# Patient Record
Sex: MALE | Race: OTHER | HISPANIC OR LATINO | Employment: UNEMPLOYED | URBAN - METROPOLITAN AREA
[De-identification: names, ages, dates, MRNs, and addresses within clinical notes are randomized per-mention and may not be internally consistent; named-entity substitution may affect disease eponyms.]

---

## 2021-04-28 ENCOUNTER — HOSPITAL ENCOUNTER (EMERGENCY)
Facility: HOSPITAL | Age: 56
Discharge: HOME | End: 2021-04-28
Attending: EMERGENCY MEDICINE | Admitting: EMERGENCY MEDICINE
Payer: MEDICARE

## 2021-04-28 VITALS
OXYGEN SATURATION: 95 % | TEMPERATURE: 97.5 F | DIASTOLIC BLOOD PRESSURE: 75 MMHG | RESPIRATION RATE: 20 BRPM | SYSTOLIC BLOOD PRESSURE: 121 MMHG | HEART RATE: 94 BPM

## 2021-04-28 DIAGNOSIS — K04.7 DENTAL INFECTION: Primary | ICD-10-CM

## 2021-04-28 PROCEDURE — 99284 EMERGENCY DEPT VISIT MOD MDM: CPT | Performed by: PHYSICIAN ASSISTANT

## 2021-04-28 PROCEDURE — 99282 EMERGENCY DEPT VISIT SF MDM: CPT

## 2021-04-28 RX ORDER — AMOXICILLIN 500 MG/1
500 CAPSULE ORAL EVERY 8 HOURS SCHEDULED
Qty: 21 CAPSULE | Refills: 0 | Status: SHIPPED | OUTPATIENT
Start: 2021-04-28 | End: 2021-05-05

## 2021-04-28 RX ORDER — IBUPROFEN 600 MG/1
600 TABLET ORAL EVERY 6 HOURS PRN
Qty: 20 TABLET | Refills: 0 | Status: SHIPPED | OUTPATIENT
Start: 2021-04-28

## 2021-04-28 NOTE — ED PROVIDER NOTES
History  Chief Complaint   Patient presents with    Dental Pain     Patient presents to the ER for evaluation of dental pain  The patient states that a few years ago he had a root canal done in his right lower molar  States that for the past 3 days he has noticed increased pain in that tooth and mild swelling  States that the pain is worse with hot and cold liquids as well as chewing  States he has been taking Tylenol with mild relief  Denies any radiation of pain or new trauma  Denies any fevers, difficulty swallowing, or any other concerning symptoms  None       History reviewed  No pertinent past medical history  History reviewed  No pertinent surgical history  History reviewed  No pertinent family history  I have reviewed and agree with the history as documented  E-Cigarette/Vaping     E-Cigarette/Vaping Substances     Social History     Tobacco Use    Smoking status: Never Smoker    Smokeless tobacco: Never Used   Substance Use Topics    Alcohol use: Not Currently    Drug use: Not Currently       Review of Systems   Constitutional: Negative for chills and fever  HENT: Positive for dental problem and facial swelling  Negative for congestion and sore throat  Respiratory: Negative for cough and shortness of breath  Cardiovascular: Negative for chest pain  Gastrointestinal: Negative for abdominal pain, diarrhea, nausea and vomiting  Genitourinary: Negative for dysuria  Musculoskeletal: Negative for back pain  Skin: Negative for rash  Neurological: Negative for headaches  All other systems reviewed and are negative  Physical Exam  Physical Exam  Constitutional:       General: He is not in acute distress  Appearance: He is well-developed  HENT:      Head: Normocephalic and atraumatic  Nose: Nose normal       Mouth/Throat:        Comments: Minimal swelling along right lower mandible  No fluctuant mass or actively draining abscess  No trismus    No sublingual swelling  Tolerating secretions without difficulty  Eyes:      Conjunctiva/sclera: Conjunctivae normal    Neck:      Musculoskeletal: Normal range of motion  Cardiovascular:      Rate and Rhythm: Normal rate  Pulmonary:      Effort: Pulmonary effort is normal    Musculoskeletal: Normal range of motion  Lymphadenopathy:      Cervical: No cervical adenopathy  Skin:     General: Skin is warm  Capillary Refill: Capillary refill takes less than 2 seconds  Neurological:      Mental Status: He is alert and oriented to person, place, and time  Vital Signs  ED Triage Vitals [04/28/21 1044]   Temperature Pulse Respirations Blood Pressure SpO2   97 5 °F (36 4 °C) 94 20 121/75 95 %      Temp Source Heart Rate Source Patient Position - Orthostatic VS BP Location FiO2 (%)   Oral Monitor -- Right arm --      Pain Score       --           Vitals:    04/28/21 1044   BP: 121/75   Pulse: 94         Visual Acuity      ED Medications  Medications - No data to display    Diagnostic Studies  Results Reviewed     None                 No orders to display              Procedures  Procedures         ED Course  ED Course as of Apr 28 1055   Wed Apr 28, 2021   1050 Blood Pressure: 121/75   1050 Temperature: 97 5 °F (36 4 °C)   1050 Pulse: 94   1050 Respirations: 20   1050 SpO2: 95 %                             SBIRT 20yo+      Most Recent Value   SBIRT (25 yo +)   In order to provide better care to our patients, we are screening all of our patients for alcohol and drug use  Would it be okay to ask you these screening questions? No Filed at: 04/28/2021 1020                    MDM     Patient well appearing in the ER  Suspect likely dental infection given tenderness palpation of tooth and mild facial swelling  Pain is reproducible  Consistent with dental infection  No concern for cardiac cause of dental pain at this time  Patient nontoxic and afebrile    Discussed treatment with antibiotics and pain control  Discussed importance of following up with a dentist for definitive treatment  Strict return instructions given  Patient in no acute distress throughout ER stay  Vitals stable and reassuring  Patient stable for discharge at this time  Reviewed plan with patient/family  Reviewed red flag symptoms and strict return instructions  Patient/family voiced understanding and agreement to plan  Patient/family had opportunity to ask questions and all questions were answered at bedside  Disposition  Final diagnoses:   Dental infection     Time reflects when diagnosis was documented in both MDM as applicable and the Disposition within this note     Time User Action Codes Description Comment    4/28/2021 10:50 AM Dwaynetammi Latimer Vilma [K04 7] Dental infection       ED Disposition     ED Disposition Condition Date/Time Comment    Discharge Stable Wed Apr 28, 2021 10:50 AM Renata Villanueva Dr discharge to home/self care              Follow-up Information     Follow up With Specialties Details Why Contact Info Additional [address] Emergency Department Emergency Medicine  If symptoms worsen [address]  [address] [place] [address] Emergency Department, [address] [place] [address] for Oral and Maxillofacial Surgery [facility]   As discussed you must follow up with the dentist for definitive treatment [address] [facility] [address]           Patient's Medications   Discharge Prescriptions    AMOXICILLIN (AMOXIL) 500 MG CAPSULE    Take 1 capsule (500 mg total) by mouth every 8 (eight) hours for 7 days       Start Date: 4/28/2021 End Date: 5/5/2021       Order Dose: 500 mg       Quantity: 21 capsule    Refills: 0    IBUPROFEN (MOTRIN) 600 MG TABLET    Take 1 tablet (600 mg total) by mouth every 6 (six) hours as needed for mild pain       Start Date: 4/28/2021 End Date: --       Order Dose: 600 mg       Quantity: 20 tablet    Refills: 0     No discharge procedures on file      PDMP Review     None          ED Provider  Electronically Signed by           Ruchi Downs PA-C  04/28/21 1051

## 2021-04-28 NOTE — DISCHARGE INSTRUCTIONS
Return to the ER with any new or worsening of symptoms such as but not limited to increased pain, increased swelling, fevers, difficulty breathing, difficulty swallowing, or any other concerning symptmos    Take antibiotics as prescribed to completion  Thank you for allowing us to be part of your care today

## 2021-09-03 ENCOUNTER — HOSPITAL ENCOUNTER (EMERGENCY)
Facility: HOSPITAL | Age: 56
Discharge: HOME | End: 2021-09-03
Attending: EMERGENCY MEDICINE | Admitting: EMERGENCY MEDICINE
Payer: COMMERCIAL

## 2021-09-03 VITALS
SYSTOLIC BLOOD PRESSURE: 112 MMHG | DIASTOLIC BLOOD PRESSURE: 71 MMHG | RESPIRATION RATE: 16 BRPM | HEART RATE: 57 BPM | OXYGEN SATURATION: 97 % | TEMPERATURE: 98.2 F | BODY MASS INDEX: 27.46 KG/M2 | WEIGHT: 155 LBS | HEIGHT: 63 IN

## 2021-09-03 DIAGNOSIS — S05.02XA ABRASION OF LEFT CORNEA, INITIAL ENCOUNTER: Primary | ICD-10-CM

## 2021-09-03 LAB
ATRIAL RATE: 53 BPM
P AXIS: 62 DEGREES
PR INTERVAL: 116 MS
QRS AXIS: 70 DEGREES
QRSD INTERVAL: 98 MS
QT INTERVAL: 426 MS
QTC INTERVAL: 399 MS
T WAVE AXIS: 45 DEGREES
VENTRICULAR RATE: 53 BPM

## 2021-09-03 PROCEDURE — 99283 EMERGENCY DEPT VISIT LOW MDM: CPT

## 2021-09-03 PROCEDURE — 93010 ELECTROCARDIOGRAM REPORT: CPT | Performed by: INTERNAL MEDICINE

## 2021-09-03 PROCEDURE — 93005 ELECTROCARDIOGRAM TRACING: CPT

## 2021-09-03 PROCEDURE — 99284 EMERGENCY DEPT VISIT MOD MDM: CPT | Performed by: EMERGENCY MEDICINE

## 2021-09-03 RX ORDER — TETRACAINE HYDROCHLORIDE 5 MG/ML
1 SOLUTION OPHTHALMIC ONCE
Status: COMPLETED | OUTPATIENT
Start: 2021-09-03 | End: 2021-09-03

## 2021-09-03 RX ORDER — ERYTHROMYCIN 5 MG/G
OINTMENT OPHTHALMIC
Qty: 3.5 G | Refills: 0 | Status: SHIPPED | OUTPATIENT
Start: 2021-09-03

## 2021-09-03 RX ADMIN — TETRACAINE HYDROCHLORIDE 1 DROP: 5 SOLUTION OPHTHALMIC at 12:03

## 2021-09-03 RX ADMIN — FLUORESCEIN SODIUM 1 STRIP: 1 STRIP OPHTHALMIC at 12:03

## 2021-09-03 NOTE — ED PROVIDER NOTES
History  Chief Complaint   Patient presents with    Eye Pain     pt reports left eye pain and redness  reports visual changes  denies drainage     Patient is a 44-year-old male, no significant past medical history, who presents to the emergency department for sudden-onset left eye pain  Patient states the pain started yesterday morning  He describes the pain as a burning, and it is only of his left eye  Patient states he tried using some eyedrops he had left over with no relief  There are no other modifying factors  There are no associated symptoms  Patient denies any actual vision changes  He denies any headaches, dizziness, nausea, vomiting, diarrhea, or any other new or concerning symptoms  He denies any prior history of eye pain like this in the past   He does not wear contacts  Prior to Admission Medications   Prescriptions Last Dose Informant Patient Reported? Taking?   ibuprofen (MOTRIN) 600 mg tablet   No No   Sig: Take 1 tablet (600 mg total) by mouth every 6 (six) hours as needed for mild pain      Facility-Administered Medications: None       History reviewed  No pertinent past medical history  History reviewed  No pertinent surgical history  History reviewed  No pertinent family history  I have reviewed and agree with the history as documented  E-Cigarette/Vaping     E-Cigarette/Vaping Substances     Social History     Tobacco Use    Smoking status: Never Smoker    Smokeless tobacco: Never Used   Substance Use Topics    Alcohol use: Not Currently    Drug use: Not Currently        Review of Systems   Constitutional: Negative for chills and fever  Eyes: Positive for pain and redness  Negative for visual disturbance  Respiratory: Negative for cough and shortness of breath  Gastrointestinal: Negative for diarrhea, nausea and vomiting  Neurological: Negative for dizziness and headaches  All other systems reviewed and are negative        Physical Exam  ED Triage Vitals [09/03/21 1124]   Temperature Pulse Respirations Blood Pressure SpO2   98 2 °F (36 8 °C) 57 16 112/71 97 %      Temp Source Heart Rate Source Patient Position - Orthostatic VS BP Location FiO2 (%)   Tympanic Monitor Sitting Right arm --      Pain Score       8             Orthostatic Vital Signs  Vitals:    09/03/21 1124   BP: 112/71   Pulse: 57   Patient Position - Orthostatic VS: Sitting       Physical Exam  Vitals and nursing note reviewed  Constitutional:       General: He is not in acute distress  Appearance: He is well-developed  He is not diaphoretic  HENT:      Head: Normocephalic and atraumatic  Right Ear: External ear normal       Left Ear: External ear normal       Nose: Nose normal    Eyes:      General: Lids are normal  No scleral icterus  Left eye: No foreign body  Intraocular pressure: Right eye pressure is 12 mmHg  Left eye pressure is 12 mmHg  Extraocular Movements: Extraocular movements intact  Right eye: Normal extraocular motion and no nystagmus  Left eye: Normal extraocular motion and no nystagmus  Pupils: Pupils are equal, round, and reactive to light  Right eye: Pupil is round and reactive  Left eye: Pupil is round and reactive  Corneal abrasion and fluorescein uptake present  Comments: Subconjunctival hemorrhage of the medial half of patient's left eye  There are also several small areas of fluorescein uptake on top of the subconjunctival hemorrhage  Left eye lid was everted, no obvious foreign bodies   Cardiovascular:      Rate and Rhythm: Normal rate and regular rhythm  Heart sounds: Normal heart sounds  No murmur heard  No friction rub  No gallop  Pulmonary:      Effort: Pulmonary effort is normal  No respiratory distress  Breath sounds: Normal breath sounds  No wheezing or rales  Musculoskeletal:         General: No deformity  Normal range of motion  Skin:     General: Skin is warm and dry     Neurological: General: No focal deficit present  Mental Status: He is alert  Psychiatric:         Mood and Affect: Mood normal          Behavior: Behavior normal          ED Medications  Medications   fluorescein sodium sterile ophthalmic strip 1 strip (1 strip Both Eyes Given 9/3/21 1203)   tetracaine 0 5 % ophthalmic solution 1 drop (1 drop Left Eye Given 9/3/21 1203)       Diagnostic Studies  Results Reviewed     None                 No orders to display         Procedures  Procedures      ED Course                                       MDM  Number of Diagnoses or Management Options  Abrasion of left cornea, initial encounter  Diagnosis management comments: Patient is a 55 y o  male who presents to the ED for left eye pain and redness  Significant physical exam findings include subconjunctival hemorrhage and fluorescein uptake over the medial half of patient's left eye  Ocular pressure is within normal limits  Vision grossly intact  Clinical impression is corneal abrasion  Will prescribe antibiotic ointment for patient's corneal abrasion  Recommended Tylenol or ibuprofen as needed for any pain  Discussed follow-up with ophthalmology within the next 3-5 days if patient's symptoms persist   In addition, recommended he follow up with a primary care provider  Return precautions discussed  Patient verbalized understanding and agreed to plan of care  Portions of the record may have been created with voice recognition software  Occasional wrong word or "sound a like" substitutions may have occurred due to the inherent limitations of voice recognition software  Read the chart carefully and recognize, using context, where substitutions have occurred        Risk of Complications, Morbidity, and/or Mortality  Presenting problems: moderate  Diagnostic procedures: moderate  Management options: moderate    Patient Progress  Patient progress: stable      Disposition  Final diagnoses:   Abrasion of left cornea, initial encounter     Time reflects when diagnosis was documented in both MDM as applicable and the Disposition within this note     Time User Action Codes Description Comment    9/3/2021 12:17 PM Harika Mkie Add [S05  02XA] Abrasion of left cornea, initial encounter       ED Disposition     ED Disposition Condition Date/Time Comment    Discharge Stable Fri Sep 3, 2021 12:17 PM Renata Villanueva Dr discharge to home/self care  Follow-up Information     Follow up With Specialties Details Why Contact Info Additional [address] Emergency Department Emergency Medicine  As needed [address]  [address] [facility] [address] Emergency Department, [address], [facility] [address]    [address]  Call in 1 day  [phone]       Cherise Hassan MD Ophthalmology Schedule an appointment as soon as possible for a visit   Andrea Wright [address] [place] [id number]  [phone]             Discharge Medication List as of 9/3/2021 12:23 PM      START taking these medications    Details   erythromycin (ILOTYCIN) ophthalmic ointment Place a 1/2 inch ribbon of ointment into the left lower eyelid 4 times a day for 5 days  , Normal         CONTINUE these medications which have NOT CHANGED    Details   ibuprofen (MOTRIN) 600 mg tablet Take 1 tablet (600 mg total) by mouth every 6 (six) hours as needed for mild pain, Starting Wed 4/28/2021, Normal           No discharge procedures on file  PDMP Review     None           ED Provider  Attending physically available and evaluated Renata Mustafa I managed the patient along with the ED Attending      Electronically Signed by         Davin Foster DO  09/03/21 5196

## 2021-09-03 NOTE — ED ATTENDING ATTESTATION
9/3/2021  ICandida MD, saw and evaluated the patient  I have discussed the patient with the resident/non-physician practitioner and agree with the resident's/non-physician practitioner's findings, Plan of Care, and MDM as documented in the resident's/non-physician practitioner's note, except where noted  All available labs and Radiology studies were reviewed  I was present for key portions of any procedure(s) performed by the resident/non-physician practitioner and I was immediately available to provide assistance  At this point I agree with the current assessment done in the Emergency Department  I have conducted an independent evaluation of this patient a history and physical is as follows:    ED Course         Critical Care Time  Procedures    54 yo male with left medial eye pain and redness since yesterday  No change in vision  Pt with burning and redness  No hx of same, no trauma, no fb sensation, no headache  Vss, afebrile, lungs cta, rrr, alvina on left eye  Check visual acuity, pressure, stain

## 2021-09-03 NOTE — DISCHARGE INSTRUCTIONS
You were seen in the ED for left eye pain  Your evaluation showed small corneal abrasions  A prescription for an antibiotic cream was sent to your pharmacy  Please use as prescribed  You may use Tylenol or ibuprofen as needed for any pain  Please follow-up with ophthalmology  The number is attached  Please follow-up with your primary care provider  If you do not have one, you may call "infolink" above  Return to the emergency department if you develop any vision loss or changes, worsening pain, severe headaches, nausea, vomiting, or for any other new or concerning symptoms

## 2021-10-01 ENCOUNTER — APPOINTMENT (EMERGENCY)
Dept: RADIOLOGY | Facility: HOSPITAL | Age: 56
End: 2021-10-01
Payer: COMMERCIAL

## 2021-10-01 ENCOUNTER — HOSPITAL ENCOUNTER (EMERGENCY)
Facility: HOSPITAL | Age: 56
Discharge: HOME | End: 2021-10-01
Attending: EMERGENCY MEDICINE
Payer: COMMERCIAL

## 2021-10-01 VITALS
DIASTOLIC BLOOD PRESSURE: 66 MMHG | TEMPERATURE: 98.3 F | SYSTOLIC BLOOD PRESSURE: 113 MMHG | HEART RATE: 72 BPM | RESPIRATION RATE: 20 BRPM | OXYGEN SATURATION: 95 %

## 2021-10-01 DIAGNOSIS — R07.89 ATYPICAL CHEST PAIN: Primary | ICD-10-CM

## 2021-10-01 LAB
ALBUMIN SERPL BCP-MCNC: 4.1 G/DL (ref 3.5–5)
ALP SERPL-CCNC: 82 U/L (ref 46–116)
ALT SERPL W P-5'-P-CCNC: 101 U/L (ref 12–78)
ANION GAP SERPL CALCULATED.3IONS-SCNC: 5 MMOL/L (ref 4–13)
AST SERPL W P-5'-P-CCNC: 46 U/L (ref 5–45)
ATRIAL RATE: 70 BPM
ATRIAL RATE: 73 BPM
BASOPHILS # BLD AUTO: 0.04 THOUSANDS/ΜL (ref 0–0.1)
BASOPHILS NFR BLD AUTO: 0 % (ref 0–1)
BILIRUB SERPL-MCNC: 0.59 MG/DL (ref 0.2–1)
BUN SERPL-MCNC: 13 MG/DL (ref 5–25)
CALCIUM SERPL-MCNC: 9.4 MG/DL (ref 8.3–10.1)
CHLORIDE SERPL-SCNC: 108 MMOL/L (ref 100–108)
CO2 SERPL-SCNC: 24 MMOL/L (ref 21–32)
CREAT SERPL-MCNC: 0.78 MG/DL (ref 0.6–1.3)
EOSINOPHIL # BLD AUTO: 0.25 THOUSAND/ΜL (ref 0–0.61)
EOSINOPHIL NFR BLD AUTO: 3 % (ref 0–6)
ERYTHROCYTE [DISTWIDTH] IN BLOOD BY AUTOMATED COUNT: 12.5 % (ref 11.6–15.1)
GFR SERPL CREATININE-BSD FRML MDRD: 102 ML/MIN/1.73SQ M
GLUCOSE SERPL-MCNC: 105 MG/DL (ref 65–140)
HCT VFR BLD AUTO: 43.2 % (ref 36.5–49.3)
HGB BLD-MCNC: 14.7 G/DL (ref 12–17)
IMM GRANULOCYTES # BLD AUTO: 0.05 THOUSAND/UL (ref 0–0.2)
IMM GRANULOCYTES NFR BLD AUTO: 1 % (ref 0–2)
LYMPHOCYTES # BLD AUTO: 0.99 THOUSANDS/ΜL (ref 0.6–4.47)
LYMPHOCYTES NFR BLD AUTO: 11 % (ref 14–44)
MCH RBC QN AUTO: 29.8 PG (ref 26.8–34.3)
MCHC RBC AUTO-ENTMCNC: 34 G/DL (ref 31.4–37.4)
MCV RBC AUTO: 88 FL (ref 82–98)
MONOCYTES # BLD AUTO: 0.51 THOUSAND/ΜL (ref 0.17–1.22)
MONOCYTES NFR BLD AUTO: 6 % (ref 4–12)
NEUTROPHILS # BLD AUTO: 7.27 THOUSANDS/ΜL (ref 1.85–7.62)
NEUTS SEG NFR BLD AUTO: 79 % (ref 43–75)
NRBC BLD AUTO-RTO: 0 /100 WBCS
P AXIS: 57 DEGREES
P AXIS: 63 DEGREES
PLATELET # BLD AUTO: 240 THOUSANDS/UL (ref 149–390)
PMV BLD AUTO: 9.8 FL (ref 8.9–12.7)
POTASSIUM SERPL-SCNC: 3.9 MMOL/L (ref 3.5–5.3)
PR INTERVAL: 118 MS
PR INTERVAL: 128 MS
PROT SERPL-MCNC: 7.5 G/DL (ref 6.4–8.2)
QRS AXIS: 59 DEGREES
QRS AXIS: 67 DEGREES
QRSD INTERVAL: 88 MS
QRSD INTERVAL: 88 MS
QT INTERVAL: 378 MS
QT INTERVAL: 382 MS
QTC INTERVAL: 412 MS
QTC INTERVAL: 416 MS
RBC # BLD AUTO: 4.93 MILLION/UL (ref 3.88–5.62)
SODIUM SERPL-SCNC: 137 MMOL/L (ref 136–145)
T WAVE AXIS: 53 DEGREES
T WAVE AXIS: 54 DEGREES
TROPONIN I SERPL-MCNC: <0.02 NG/ML
TROPONIN I SERPL-MCNC: <0.02 NG/ML
VENTRICULAR RATE: 70 BPM
VENTRICULAR RATE: 73 BPM
WBC # BLD AUTO: 9.11 THOUSAND/UL (ref 4.31–10.16)

## 2021-10-01 PROCEDURE — 99285 EMERGENCY DEPT VISIT HI MDM: CPT

## 2021-10-01 PROCEDURE — 36415 COLL VENOUS BLD VENIPUNCTURE: CPT | Performed by: EMERGENCY MEDICINE

## 2021-10-01 PROCEDURE — 93005 ELECTROCARDIOGRAM TRACING: CPT

## 2021-10-01 PROCEDURE — 84484 ASSAY OF TROPONIN QUANT: CPT | Performed by: EMERGENCY MEDICINE

## 2021-10-01 PROCEDURE — 93010 ELECTROCARDIOGRAM REPORT: CPT | Performed by: INTERNAL MEDICINE

## 2021-10-01 PROCEDURE — 80053 COMPREHEN METABOLIC PANEL: CPT | Performed by: EMERGENCY MEDICINE

## 2021-10-01 PROCEDURE — 99285 EMERGENCY DEPT VISIT HI MDM: CPT | Performed by: EMERGENCY MEDICINE

## 2021-10-01 PROCEDURE — 85025 COMPLETE CBC W/AUTO DIFF WBC: CPT | Performed by: EMERGENCY MEDICINE

## 2021-10-01 PROCEDURE — 71045 X-RAY EXAM CHEST 1 VIEW: CPT

## 2021-10-01 RX ORDER — SUCRALFATE 1 G/1
1 TABLET ORAL 4 TIMES DAILY
Qty: 56 TABLET | Refills: 0 | Status: SHIPPED | OUTPATIENT
Start: 2021-10-01 | End: 2021-10-15

## 2021-10-01 RX ORDER — SUCRALFATE 1 G/1
1 TABLET ORAL ONCE
Status: COMPLETED | OUTPATIENT
Start: 2021-10-01 | End: 2021-10-01

## 2021-10-01 RX ORDER — ACETAMINOPHEN 325 MG/1
650 TABLET ORAL ONCE
Status: COMPLETED | OUTPATIENT
Start: 2021-10-01 | End: 2021-10-01

## 2021-10-01 RX ADMIN — ACETAMINOPHEN 650 MG: 325 TABLET, FILM COATED ORAL at 14:47

## 2021-10-01 RX ADMIN — SUCRALFATE 1 G: 1 TABLET ORAL at 14:48

## 2021-11-15 ENCOUNTER — OFFICE VISIT (OUTPATIENT)
Dept: PODIATRY | Facility: CLINIC | Age: 56
End: 2021-11-15
Payer: COMMERCIAL

## 2021-11-15 VITALS
BODY MASS INDEX: 26.4 KG/M2 | SYSTOLIC BLOOD PRESSURE: 120 MMHG | WEIGHT: 149 LBS | HEIGHT: 63 IN | DIASTOLIC BLOOD PRESSURE: 82 MMHG

## 2021-11-15 DIAGNOSIS — B35.1 TINEA UNGUIUM: Primary | ICD-10-CM

## 2021-11-15 PROCEDURE — 99202 OFFICE O/P NEW SF 15 MIN: CPT | Performed by: PODIATRIST

## 2021-11-15 RX ORDER — OMEPRAZOLE 20 MG/1
20 CAPSULE, DELAYED RELEASE ORAL DAILY
COMMUNITY
Start: 2021-10-06

## 2021-12-30 ENCOUNTER — HOSPITAL ENCOUNTER (EMERGENCY)
Facility: HOSPITAL | Age: 56
Discharge: HOME | End: 2021-12-30
Attending: EMERGENCY MEDICINE | Admitting: EMERGENCY MEDICINE
Payer: COMMERCIAL

## 2021-12-30 VITALS
BODY MASS INDEX: 26.58 KG/M2 | HEIGHT: 63 IN | OXYGEN SATURATION: 98 % | TEMPERATURE: 99.2 F | WEIGHT: 150 LBS | DIASTOLIC BLOOD PRESSURE: 77 MMHG | HEART RATE: 83 BPM | RESPIRATION RATE: 16 BRPM | SYSTOLIC BLOOD PRESSURE: 107 MMHG

## 2021-12-30 DIAGNOSIS — Z20.822 ENCOUNTER FOR LABORATORY TESTING FOR COVID-19 VIRUS: Primary | ICD-10-CM

## 2021-12-30 DIAGNOSIS — J02.9 SORE THROAT: ICD-10-CM

## 2021-12-30 PROCEDURE — 87636 SARSCOV2 & INF A&B AMP PRB: CPT | Performed by: STUDENT IN AN ORGANIZED HEALTH CARE EDUCATION/TRAINING PROGRAM

## 2021-12-30 PROCEDURE — 99282 EMERGENCY DEPT VISIT SF MDM: CPT

## 2021-12-30 PROCEDURE — 99284 EMERGENCY DEPT VISIT MOD MDM: CPT | Performed by: EMERGENCY MEDICINE

## 2021-12-30 NOTE — DISCHARGE INSTRUCTIONS
Please quarantine for 5 days  You will be contacted with your results  What is COVID-19? COVID-19 stands for "coronavirus disease 2019 " It is caused by a virus called SARS-CoV-2  The virus first appeared in late 2019 and quickly spread around the world  People with COVID-19 can have fever, cough, trouble breathing (when the virus infects the lungs), and other symptoms  Since COVID-19 is a fairly new disease, experts are still studying how people recover from it  They are also studying the possible long-term effects  When will I get better after having COVID-19? For most people who get COVID-19, symptoms get better within a few weeks  But some people, especially those who got sick enough to need to go to the hospital, continue to have symptoms for longer  These can be mild or more serious  Doctors are still learning about COVID-19  But they generally describe 3 stages of illness and recovery:  ?"Acute COVID-19" - This refers to symptoms lasting up to 4 weeks after a person is infected  Most people with mild COVID-19 do not have symptoms beyond this stage, but some do  ?"Ongoing symptomatic COVID-19" - This refers to symptoms that continue for 4 to 12 weeks after being infected  People who get severely ill during the acute stage are more likely to have ongoing symptoms  ? "Post-COVID-19" - This refers to symptoms that continue beyond 12 weeks after being infected  This is more common in people who were critically ill, meaning they needed to stay in the intensive care unit ("ICU"), be put on a ventilator (breathing machine), or have other types of breathing support  Different terms have been used when people have persistent symptoms, meaning symptoms that last longer than a few months   These include "long-COVID," "chronic COVID-19," and "post-COVID syndrome " Doctors also use the term "post-acute sequelae of SARS-CoV-2 infection," or "PASC "     What symptoms are most likely to persist?  This is not the same for everyone  But symptoms that are more likely to last beyond a few weeks include:  ? Feeling very tired (fatigue)  ? Trouble breathing  ? Chest discomfort  ? Cough  Other physical symptoms can also continue beyond a few weeks  These include problems with sense of smell or taste, headache, runny nose, joint or muscle pain, trouble sleeping or eating, sweating, and diarrhea  Some people have ongoing psychological symptoms, too  These might include:  ?Trouble thinking clearly, focusing, or remembering  ? Depression, anxiety, or a related condition called post-traumatic stress disorder ("PTSD")  It's hard for doctors to predict when symptoms will improve, since this is different for different people  Your recovery will depend on your age, your overall health, and how severe your COVID-19 symptoms are  Some symptoms, like fatigue, might continue even while others improve or go away  How long will I be contagious? It's hard to know for sure  In general, most people are no longer contagious by 10 to 14 days after their symptoms started  But this depends on several things, including how severe the infection was and what symptoms they continue to have  Anyone who has COVID-19 should stay home and "self-isolate" away from other people  This includes trying to stay away from people who live or share the same space with you  Most people with mild illness can usually stop self-isolation when all of the following are true:  ?It has been at least 10 days since symptoms first started  ? They have not had a fever for at least 1 day (24 hours) without using fever-reducing medicine  ? Their symptoms are improving (such as cough and trouble breathing)  People who were severely ill with COVID-19, or whose immune system is weaker than normal (for example, due to HIV infection or certain medicines), might be contagious for longer   Its important to talk to your doctor or nurse to figure out when you are no longer considered contagious  When should I call my doctor or nurse? Some fatigue is common, and can persist for a few weeks into your recovery  But if you had COVID-19 and continue to have bothersome symptoms (such as severe fatigue, or chest discomfort or shortness of breath) after 2 to 3 weeks, call your doctor or nurse  You should also call if you start to feel worse or develop any new symptoms  They will tell you what to do and if you need to be seen  Depending on your symptoms, you might need tests  This will help your doctor or nurse better understand what is causing your symptoms and whether you need treatment  How are persistent COVID-19 symptoms treated? In general, treatment involves addressing whichever symptoms you have  Often that means combining a few different treatments  If you are tired, try to get plenty of rest  You can also try the following things to help with fatigue:  ? Plan to do important tasks when you expect to have the most energy, typically in the morning  ? Pace yourself so you do not do too much at once, and take breaks throughout the day if you feel tired  ? Think about what tasks and activities are most important each day, so you dont use more energy than you need to  If you are not sleeping well, improving your "sleep hygiene" can help  This involves things like going to bed and getting up at the same time each day, avoiding caffeine and alcohol late in the day, and not looking at screens before bed  Depending on your situation, you might also need:  ?Medicines to relieve symptoms like cough or pain  ? Cardiac rehabilitation - This involves improving your heart health through things like exercise, dietary changes, and quitting smoking (if you smoke)  ?Pulmonary rehabilitation - This includes breathing exercises to help strengthen your lungs  ?Physical and occupational therapy - This involves learning exercises, movements, and ways of doing everyday tasks    ?Treatments for anxiety or depression - This can involve medicine and/or counseling  ? Exercises and strategies to help with memory and focus    Is there any way to avoid persistent COVID-19 symptoms? The only way to avoid this for sure is to avoid getting COVID-19  It's true that most people who are infected will not get very sick  But it's impossible to know who will recover quickly and who will have persistent symptoms  When you are able to get a vaccine, you should  This is the best thing you can do to prevent COVID-19  But there are other things you can do, too  These include social distancing, wearing face masks in public, not touching your face, and washing your hands often  Doing these things will protect you while also helping to slow the spread of COVID-19        Source: UpToDate

## 2021-12-30 NOTE — ED ATTENDING ATTESTATION
12/30/2021  Gwyn Weber DO, saw and evaluated the patient  I have discussed the patient with the resident/non-physician practitioner and agree with the resident's/non-physician practitioner's findings, Plan of Care, and MDM as documented in the resident's/non-physician practitioner's note, except where noted  All available labs and Radiology studies were reviewed  I was present for key portions of any procedure(s) performed by the resident/non-physician practitioner and I was immediately available to provide assistance  At this point I agree with the current assessment done in the Emergency Department  I have conducted an independent evaluation of this patient a history and physical is as follows:    ED Course      HPI: Patient is a 55 y o  male who presents with 1 days of sore throat which the patient describes at mild The patient has had contact with people with similar symptoms  The patient has not taken any medication  Nursing notes reviewed  Physical Exam:  ED Triage Vitals   Temperature Pulse Respirations Blood Pressure SpO2   12/30/21 1719 12/30/21 1720 12/30/21 1720 12/30/21 1720 12/30/21 1720   99 2 °F (37 3 °C) 83 16 107/77 98 %      Temp Source Heart Rate Source Patient Position - Orthostatic VS BP Location FiO2 (%)   12/30/21 1719 12/30/21 1720 12/30/21 1720 12/30/21 1720 --   Tympanic Monitor Sitting Left arm       Pain Score       12/30/21 1719       No Pain           ROS: Positive for sore throat, the remainder of a 10 organ system ROS was otherwise unremarkable    General: awake, alert, no acute distress    Head: normocephalic, atraumatic    Eyes: no scleral icterus  Ears: external ears normal, hearing grossly intact  Nose: external exam grossly normal  Neck: symmetric, No JVD noted, trachea midline  Pulmonary: no respiratory distress, no tachypnea noted  Cardiovascular: appears well perfused  Abdomen: no distention noted  Musculoskeletal: no deformities noted, tone normal  Neuro: grossly non-focal  Psych: mood and affect appropriate    The patient is stable and has a history and physical exam consistent with a viral illness  COVID19 testing has been performed  I considered the patient's other medical conditions as applicable/noted above in my medical decision making  The patient is stable upon discharge  The plan is for supportive care at home  The patient (and any family present) verbalized understanding of the discharge instructions and warnings that would necessitate return to the Emergency Department  All questions were answered prior to discharge      Electronically Signed by        Critical Care Time  Procedures

## 2021-12-30 NOTE — ED PROVIDER NOTES
HPI: Patient is a 55 y o  male who presents with 1 days of sore throat which the patient describes at mild The patient has had contact with people with similar symptoms  The patient has not taken any medication  No Known Allergies    History reviewed  No pertinent past medical history  History reviewed  No pertinent surgical history  Social History     Tobacco Use    Smoking status: Never Smoker    Smokeless tobacco: Never Used   Vaping Use    Vaping Use: Never used   Substance Use Topics    Alcohol use: Not Currently    Drug use: Not Currently       Nursing notes reviewed  Physical Exam:  ED Triage Vitals   Temperature Pulse Respirations Blood Pressure SpO2   12/30/21 1719 12/30/21 1720 12/30/21 1720 12/30/21 1720 12/30/21 1720   99 2 °F (37 3 °C) 83 16 107/77 98 %      Temp Source Heart Rate Source Patient Position - Orthostatic VS BP Location FiO2 (%)   12/30/21 1719 12/30/21 1720 12/30/21 1720 12/30/21 1720 --   Tympanic Monitor Sitting Left arm       Pain Score       12/30/21 1719       No Pain           ROS: Positive for sore throat, the remainder of a 10 organ system ROS was otherwise unremarkable  General: awake, alert, no acute distress    Head: normocephalic, atraumatic    Eyes: no scleral icterus  Ears: external ears normal, hearing grossly intact  Nose: external exam grossly normal, negative nasal discharge  Neck: symmetric, No JVD noted, trachea midline  Pulmonary: no respiratory distress, no tachypnea noted  Cardiovascular: appears well perfused  Abdomen: no distention noted  Musculoskeletal: no deformities noted, tone normal  Neuro: grossly non-focal  Psych: mood and affect appropriate    The patient is stable and has a history and physical exam consistent with a viral illness  COVID19 testing has been performed  I considered the patient's other medical conditions as applicable/noted above in my medical decision making  The patient is stable upon discharge   The plan is for supportive care at home  The patient (and any family present) verbalized understanding of the discharge instructions and warnings that would necessitate return to the Emergency Department  All questions were answered prior to discharge  Medications - No data to display  Final diagnoses:   Encounter for laboratory testing for COVID-19 virus     Time reflects when diagnosis was documented in both MDM as applicable and the Disposition within this note     Time User Action Codes Description Comment    12/30/2021  6:16 PM Tavares Field Add [W04 053] Encounter for laboratory testing for COVID-19 virus       ED Disposition     ED Disposition Condition Date/Time Comment    Discharge Stable Thu Dec 30, 2021  6:15 PM Renata Villanueva Dr discharge to home/self care  Follow-up Information     Follow up With Specialties Details Why Contact Info Additional Information    [address] [place] [address] 34 [facility] Emergency Department Emergency Medicine  As needed [address]  958 [facility] 64 [facility] Emergency Department, [address]    [address]  Call in 1 day  [phone]           Patient's Medications   Discharge Prescriptions    No medications on file     No discharge procedures on file      Electronically Signed by       Gary Welsh DO  12/30/21 1822

## 2022-01-05 LAB
FLUAV RNA RESP QL NAA+PROBE: NEGATIVE
FLUBV RNA RESP QL NAA+PROBE: NEGATIVE
SARS-COV-2 RNA RESP QL NAA+PROBE: POSITIVE

## 2022-01-06 ENCOUNTER — TELEPHONE (OUTPATIENT)
Dept: FAMILY MEDICINE CLINIC | Facility: CLINIC | Age: 57
End: 2022-01-06

## 2022-01-06 NOTE — TELEPHONE ENCOUNTER
Spoke to patient  He is feeling better and has upcoming appointment with PCP  Symptom onset 12/29/2021  COVID + 12/30/2021  Vaccinated, no booster          Hannah Mortimer, PA-C P Covid Follow-Up Team  For consideration for outpatient monoclonal antibody therapy

## 2022-01-13 ENCOUNTER — HOSPITAL ENCOUNTER (EMERGENCY)
Facility: HOSPITAL | Age: 57
Discharge: HOME | End: 2022-01-13
Attending: EMERGENCY MEDICINE
Payer: COMMERCIAL

## 2022-01-13 VITALS
HEART RATE: 90 BPM | TEMPERATURE: 99.7 F | RESPIRATION RATE: 18 BRPM | DIASTOLIC BLOOD PRESSURE: 60 MMHG | OXYGEN SATURATION: 98 % | SYSTOLIC BLOOD PRESSURE: 122 MMHG

## 2022-01-13 DIAGNOSIS — Z20.822 ENCOUNTER FOR LABORATORY TESTING FOR COVID-19 VIRUS: Primary | ICD-10-CM

## 2022-01-13 PROCEDURE — U0005 INFEC AGEN DETEC AMPLI PROBE: HCPCS | Performed by: EMERGENCY MEDICINE

## 2022-01-13 PROCEDURE — U0003 INFECTIOUS AGENT DETECTION BY NUCLEIC ACID (DNA OR RNA); SEVERE ACUTE RESPIRATORY SYNDROME CORONAVIRUS 2 (SARS-COV-2) (CORONAVIRUS DISEASE [COVID-19]), AMPLIFIED PROBE TECHNIQUE, MAKING USE OF HIGH THROUGHPUT TECHNOLOGIES AS DESCRIBED BY CMS-2020-01-R: HCPCS | Performed by: EMERGENCY MEDICINE

## 2022-01-13 PROCEDURE — 99283 EMERGENCY DEPT VISIT LOW MDM: CPT

## 2022-01-13 PROCEDURE — 99281 EMR DPT VST MAYX REQ PHY/QHP: CPT | Performed by: EMERGENCY MEDICINE

## 2022-01-13 NOTE — ED PROVIDER NOTES
Final Diagnosis:  1  Encounter for laboratory testing for COVID-19 virus         Chief Complaint   Patient presents with   Berta Courser Medical Problem     pt tested positive 3 weeks ago for COVID and is requiring a negatie test to return to work; denies COVID symptoms; only complaint is dry throat     This is a 55 y o  old male presenting for evaluation of the following: COVID test     He had COVID 3 weeks ago  He has had complete resolution of symptoms  Denies f/ch/n/v/cp/sob  Denies dizziness/LH  Denies any upper respiratory tract infection symptoms (cough, congestion, rhinorrhea, sore throat)  PMH:   has no past medical history on file  PSH:   has no past surgical history on file  Social:  Social History     Substance and Sexual Activity   Alcohol Use Not Currently     Social History     Tobacco Use   Smoking Status Never Smoker   Smokeless Tobacco Never Used     Social History     Substance and Sexual Activity   Drug Use Not Currently     PE:   Vitals:    01/13/22 0841 01/13/22 0843   BP:  122/60   BP Location:  Left arm   Pulse:  90   Resp:  18   Temp: 99 7 °F (37 6 °C)    TempSrc: Tympanic    SpO2:  98%   General: VSS, NAD, awake, alert  Well-nourished, well-developed  Appears stated age  Speaking normally in full sentences  Head: Normocephalic, atraumatic, nontender  Eyes: PERRL, EOM-I  No diplopia  No hyphema  No subconjunctival hemorrhages  Symmetrical lids  ENT: Atraumatic external nose and ears  MMM  No malocclusion  No stridor  Normal phonation  No drooling  Normal swallowing  Neck: Symmetric, trachea midline  No JVD  CV: RRR  +S1/S2  No murmurs or gallops  Peripheral pulses +2 throughout  No chest wall tenderness  Lungs:   Unlabored No retractions  CTAB, lungs sounds equal bilateral    No tachypnea  Abd: +BS, soft, NT/ND    MSK:   FROM   Back:   No rashes  Skin: Dry, intact  Neuro: AAOx3, GCS 15, CN II-XII grossly intact  Motor grossly intact    Psychiatric/Behavioral: Appropriate mood and affect   Exam: deferred    A:  - encounter for COVID test   P:  - As per work note instructions  Discussed no point in PCR testing; patient still wants it only b/c his work requires it      - 13 point ROS was performed and all are normal unless stated in the history above  - Nursing note reviewed  Vitals reviewed  - Orders placed by myself and/or advanced practitioner / resident     - Previous chart was reviewed  - No language barrier    - History obtained from patient  - There are no limitations to the history obtained  - Critical care time: Not applicable for this patient  Medications - No data to display  No orders to display     Orders Placed This Encounter   Procedures    COVID only - 48 hour TAT     Labs Reviewed - No data to display  Time reflects when diagnosis was documented in both MDM as applicable and the Disposition within this note     Time User Action Codes Description Comment    1/13/2022  8:46 AM Enid Lewis Add [Z20 822] Encounter for laboratory testing for COVID-19 virus       ED Disposition     ED Disposition Condition Date/Time Comment    Discharge Stable Thu Jan 13, 2022  8:46 AM [facility] Arevalogomez discharge to home/self care  Follow-up Information     Follow up With Specialties Details Why Contact Info Additional [address] Emergency Department Emergency Medicine Go to  If symptoms worsen [address]  958 [facility] 64 [facility] Emergency Department, [address] , [place], 1717 [facility] HODA Miguel Physician Assistant   [address] [place] [address], [address] [place] [address] Ave [address] [phone]           Patient's Medications   Discharge Prescriptions    No medications on file     No discharge procedures on file  Prior to Admission Medications   Prescriptions Last Dose Informant Patient Reported? Taking? erythromycin (ILOTYCIN) ophthalmic ointment   No No   Sig: Place a 1/2 inch ribbon of ointment into the left lower eyelid 4 times a day for 5 days  ibuprofen (MOTRIN) 600 mg tablet   No No   Sig: Take 1 tablet (600 mg total) by mouth every 6 (six) hours as needed for mild pain   omeprazole (PriLOSEC) 20 mg delayed release capsule   Yes No   Sig: Take 20 mg by mouth daily   sucralfate (CARAFATE) 1 g tablet   No No   Sig: Take 1 tablet (1 g total) by mouth 4 (four) times a day for 14 days      Facility-Administered Medications: None       Portions of the record may have been created with voice recognition software  Occasional wrong word or "sound a like" substitutions may have occurred due to the inherent limitations of voice recognition software  Read the chart carefully and recognize, using context, where substitutions have occurred      Electronically signed by:  René Zabala MD  01/13/22 1973

## 2022-01-14 LAB — SARS-COV-2 RNA RESP QL NAA+PROBE: NEGATIVE

## 2022-01-14 NOTE — RESULT ENCOUNTER NOTE
I called and verified patient by name and birth date and let him know that his COVID-19 swab was negative  No further questions at this time

## 2022-03-11 ENCOUNTER — APPOINTMENT (OUTPATIENT)
Dept: URGENT CARE | Facility: CLINIC | Age: 57
End: 2022-03-11

## 2022-03-11 ENCOUNTER — APPOINTMENT (OUTPATIENT)
Dept: LAB | Facility: CLINIC | Age: 57
End: 2022-03-11

## 2022-03-11 DIAGNOSIS — Z02.1 PRE-EMPLOYMENT EXAMINATION: Primary | ICD-10-CM

## 2022-03-11 DIAGNOSIS — Z02.1 PRE-EMPLOYMENT EXAMINATION: ICD-10-CM

## 2022-03-11 LAB — RUBV IGG SERPL IA-ACNC: 102.7 IU/ML

## 2022-03-11 PROCEDURE — 86787 VARICELLA-ZOSTER ANTIBODY: CPT

## 2022-03-11 PROCEDURE — 86762 RUBELLA ANTIBODY: CPT

## 2022-03-11 PROCEDURE — 86480 TB TEST CELL IMMUN MEASURE: CPT

## 2022-03-11 PROCEDURE — 36415 COLL VENOUS BLD VENIPUNCTURE: CPT

## 2022-03-11 PROCEDURE — 86765 RUBEOLA ANTIBODY: CPT

## 2022-03-11 PROCEDURE — 86735 MUMPS ANTIBODY: CPT

## 2022-03-14 LAB
GAMMA INTERFERON BACKGROUND BLD IA-ACNC: 0.74 IU/ML
M TB IFN-G BLD-IMP: NEGATIVE
M TB IFN-G CD4+ BCKGRND COR BLD-ACNC: 0.1 IU/ML
M TB IFN-G CD4+ BCKGRND COR BLD-ACNC: 0.2 IU/ML
MEV IGG SER QL: NORMAL
MITOGEN IGNF BCKGRD COR BLD-ACNC: >10 IU/ML
VZV IGG SER IA-ACNC: NORMAL

## 2022-03-15 LAB — MUV IGG SER QL: NORMAL

## 2022-04-15 ENCOUNTER — APPOINTMENT (OUTPATIENT)
Dept: LAB | Facility: HOSPITAL | Age: 57
End: 2022-04-15

## 2022-04-15 DIAGNOSIS — Z00.8 HEALTH EXAMINATION IN POPULATION SURVEY: ICD-10-CM

## 2022-04-15 LAB
CHOLEST SERPL-MCNC: 181 MG/DL
EST. AVERAGE GLUCOSE BLD GHB EST-MCNC: 108 MG/DL
HBA1C MFR BLD: 5.4 %
HDLC SERPL-MCNC: 36 MG/DL
LDLC SERPL CALC-MCNC: 74 MG/DL (ref 0–100)
NONHDLC SERPL-MCNC: 145 MG/DL
TRIGL SERPL-MCNC: 356 MG/DL

## 2022-04-15 PROCEDURE — 36415 COLL VENOUS BLD VENIPUNCTURE: CPT

## 2022-04-15 PROCEDURE — 80061 LIPID PANEL: CPT

## 2022-04-15 PROCEDURE — 83036 HEMOGLOBIN GLYCOSYLATED A1C: CPT

## 2022-08-05 ENCOUNTER — TELEPHONE (OUTPATIENT)
Dept: OTHER | Facility: OTHER | Age: 57
End: 2022-08-05

## 2022-08-23 ENCOUNTER — OFFICE VISIT (OUTPATIENT)
Dept: URGENT CARE | Age: 57
End: 2022-08-23
Payer: COMMERCIAL

## 2022-08-23 VITALS
SYSTOLIC BLOOD PRESSURE: 110 MMHG | TEMPERATURE: 98 F | HEART RATE: 68 BPM | DIASTOLIC BLOOD PRESSURE: 74 MMHG | RESPIRATION RATE: 18 BRPM | OXYGEN SATURATION: 99 %

## 2022-08-23 DIAGNOSIS — J02.9 ACUTE PHARYNGITIS, UNSPECIFIED ETIOLOGY: ICD-10-CM

## 2022-08-23 DIAGNOSIS — H65.92 LEFT NON-SUPPURATIVE OTITIS MEDIA: Primary | ICD-10-CM

## 2022-08-23 LAB
SARS-COV-2 AG UPPER RESP QL IA: NEGATIVE
VALID CONTROL: NORMAL

## 2022-08-23 PROCEDURE — 87811 SARS-COV-2 COVID19 W/OPTIC: CPT

## 2022-08-23 PROCEDURE — 99213 OFFICE O/P EST LOW 20 MIN: CPT

## 2022-08-23 RX ORDER — AMOXICILLIN 500 MG/1
500 CAPSULE ORAL EVERY 12 HOURS SCHEDULED
Qty: 14 CAPSULE | Refills: 0 | Status: SHIPPED | OUTPATIENT
Start: 2022-08-23 | End: 2022-08-30

## 2022-08-23 RX ORDER — IBUPROFEN 800 MG/1
800 TABLET ORAL EVERY 6 HOURS
COMMUNITY
Start: 2022-07-14 | End: 2022-10-25 | Stop reason: ALTCHOICE

## 2022-08-23 RX ORDER — MELOXICAM 15 MG/1
15 TABLET ORAL DAILY
COMMUNITY
Start: 2022-02-02 | End: 2022-10-25 | Stop reason: ALTCHOICE

## 2022-08-23 NOTE — PROGRESS NOTES
[address]        NAME: Conley Scheuermann is a 65 y o  male  : 1965    MRN: [id number]  DATE: 2022  TIME: 8:35 AM    Assessment and Plan   Left non-suppurative otitis media [H65 92]  1  Left non-suppurative otitis media  amoxicillin (AMOXIL) 500 mg capsule    Poct Covid 19 Rapid Antigen Test   2  Acute pharyngitis, unspecified etiology       Patient presents with complaints of sore throat and ear pain since   ( worse on right)  He states this morning he went to work but it was getting too painful to talk and his throat pain worsened  Assessment notes erythematous throat, no congestion, adenopathy, enlarged tonsils and right OM  Will order Amox  Rapid COVID negative  Declined POCT strep as he will already be treated with Amox for right OM  May take OTC tylenol/motrin    Patient Instructions       Follow up with PCP as needed    Chief Complaint     Chief Complaint   Patient presents with    Sore Throat     Sore throat and ear pain starting   Worsened today  History of Present Illness       Patient presents with complaints of sore throat and ear pain since   ( worse on right)  He states this morning he went to work but it was getting too painful to talk and his throat pain worsened  Assessment notes erythematous throat, no congestion, adenopathy, enlarged tonsils and right OM  Will order Amox  Rapid COVID negative  Declined POCT strep as he will already be treated with Amox for right OM  May take OTC tylenol/motrin      Review of Systems   Review of Systems   Constitutional: Negative for chills and fever  HENT: Positive for ear pain, sore throat, trouble swallowing and voice change  Negative for congestion, postnasal drip and sinus pain  Eyes: Negative for pain and itching  Respiratory: Negative for cough, shortness of breath and wheezing  Cardiovascular: Negative for chest pain and palpitations     Gastrointestinal: Negative for abdominal pain, constipation, diarrhea, nausea and vomiting  Genitourinary: Negative for difficulty urinating and hematuria  Musculoskeletal: Negative for arthralgias and myalgias  Skin: Negative for rash  Neurological: Negative for dizziness, light-headedness and headaches  Psychiatric/Behavioral: Negative for agitation and sleep disturbance  The patient is not nervous/anxious  Current Medications       Current Outpatient Medications:     amoxicillin (AMOXIL) 500 mg capsule, Take 1 capsule (500 mg total) by mouth every 12 (twelve) hours for 7 days, Disp: 14 capsule, Rfl: 0    Cholecalciferol 125 MCG (5000 UT) capsule, Take 5,000 Units by mouth daily, Disp: , Rfl:     erythromycin (ILOTYCIN) ophthalmic ointment, Place a 1/2 inch ribbon of ointment into the left lower eyelid 4 times a day for 5 days  , Disp: 3 5 g, Rfl: 0    ibuprofen (MOTRIN) 600 mg tablet, Take 1 tablet (600 mg total) by mouth every 6 (six) hours as needed for mild pain, Disp: 20 tablet, Rfl: 0    ibuprofen (MOTRIN) 800 mg tablet, Take 800 mg by mouth every 6 (six) hours, Disp: , Rfl:     meloxicam (MOBIC) 15 mg tablet, Take 15 mg by mouth daily, Disp: , Rfl:     omeprazole (PriLOSEC) 20 mg delayed release capsule, Take 20 mg by mouth daily, Disp: , Rfl:     sucralfate (CARAFATE) 1 g tablet, Take 1 tablet (1 g total) by mouth 4 (four) times a day for 14 days, Disp: 56 tablet, Rfl: 0    Current Allergies     Allergies as of 08/23/2022    (No Known Allergies)            The following portions of the patient's history were reviewed and updated as appropriate: allergies, current medications, past family history, past medical history, past social history, past surgical history and problem list      No past medical history on file  No past surgical history on file  No family history on file  Medications have been verified          Objective   /74   Pulse 68   Temp 98 °F (36 7 °C)   Resp 18   SpO2 99%   No LMP for male patient  Physical Exam     Physical Exam  Vitals reviewed  Constitutional:       General: He is not in acute distress  Appearance: Normal appearance  He is not ill-appearing  HENT:      Head: Normocephalic and atraumatic  Right Ear: Tenderness present  Tympanic membrane is erythematous  Nose: No congestion  Mouth/Throat:      Pharynx: Posterior oropharyngeal erythema present  Tonsils: No tonsillar exudate  2+ on the right  2+ on the left  Eyes:      Extraocular Movements: Extraocular movements intact  Conjunctiva/sclera: Conjunctivae normal    Pulmonary:      Effort: Pulmonary effort is normal    Lymphadenopathy:      Cervical: Cervical adenopathy present  Skin:     General: Skin is warm  Neurological:      General: No focal deficit present  Mental Status: He is alert     Psychiatric:         Mood and Affect: Mood normal          Behavior: Behavior normal          Judgment: Judgment normal

## 2022-10-19 ENCOUNTER — HOSPITAL ENCOUNTER (EMERGENCY)
Facility: HOSPITAL | Age: 57
Discharge: HOME | End: 2022-10-19
Attending: EMERGENCY MEDICINE
Payer: COMMERCIAL

## 2022-10-19 VITALS
HEART RATE: 59 BPM | OXYGEN SATURATION: 98 % | WEIGHT: 155 LBS | RESPIRATION RATE: 17 BRPM | SYSTOLIC BLOOD PRESSURE: 126 MMHG | DIASTOLIC BLOOD PRESSURE: 67 MMHG | HEIGHT: 63 IN | BODY MASS INDEX: 27.46 KG/M2 | TEMPERATURE: 98.1 F

## 2022-10-19 DIAGNOSIS — R22.0 LEFT FACIAL SWELLING: Primary | ICD-10-CM

## 2022-10-19 PROCEDURE — 99284 EMERGENCY DEPT VISIT MOD MDM: CPT | Performed by: EMERGENCY MEDICINE

## 2022-10-19 PROCEDURE — 96372 THER/PROPH/DIAG INJ SC/IM: CPT

## 2022-10-19 PROCEDURE — 99282 EMERGENCY DEPT VISIT SF MDM: CPT

## 2022-10-19 RX ORDER — KETOROLAC TROMETHAMINE 30 MG/ML
15 INJECTION, SOLUTION INTRAMUSCULAR; INTRAVENOUS ONCE
Status: COMPLETED | OUTPATIENT
Start: 2022-10-19 | End: 2022-10-19

## 2022-10-19 RX ADMIN — KETOROLAC TROMETHAMINE 15 MG: 30 INJECTION, SOLUTION INTRAMUSCULAR at 06:09

## 2022-10-19 NOTE — ED ATTENDING ATTESTATION
10/19/2022  IAster MD, saw and evaluated the patient  I have discussed the patient with the resident/non-physician practitioner and agree with the resident's/non-physician practitioner's findings, Plan of Care, and MDM as documented in the resident's/non-physician practitioner's note, except where noted  All available labs and Radiology studies were reviewed  I was present for key portions of any procedure(s) performed by the resident/non-physician practitioner and I was immediately available to provide assistance  At this point I agree with the current assessment done in the Emergency Department  I have conducted an independent evaluation of this patient a history and physical is as follows:    ED Course      Emergency Department Note- Audie Marshall 65 y o  male MRN: [id number]    Unit/Bed#: ED 21 Encounter: [id number]    Audie Marshall is a 65 y o  male who presents with   Chief Complaint   Patient presents with   • Oral Swelling     Pt s/p implant prep on Monday presents with swelling to left left of mouth and cheek which started last night  History of Present Illness   HPI:  Audie Marshall is a 65 y o  male who presents for evaluation of:  Left facial swelling 2 days after having dental work in preparation for tooth implants along the left upper palate  Patient denies any associated fevers and chills  He has persistent pain since that dental work on Monday  The pain is mild-to-moderate in intensity  Review of Systems   Constitutional: Negative for chills and fever  HENT: Negative for congestion and rhinorrhea  Respiratory: Negative for cough and shortness of breath  Cardiovascular: Negative for chest pain and palpitations  Gastrointestinal: Negative for nausea and vomiting  Musculoskeletal: Negative for back pain and neck pain  Neurological: Negative for light-headedness and headaches     All other systems reviewed and are negative  Historical Information   History reviewed  No pertinent past medical history  History reviewed  No pertinent surgical history  Social History   Social History     Substance and Sexual Activity   Alcohol Use Not Currently     Social History     Substance and Sexual Activity   Drug Use Not Currently     Social History     Tobacco Use   Smoking Status Never Smoker   Smokeless Tobacco Never Used     Family History: History reviewed  No pertinent family history  Meds/Allergies   PTA meds:   Prior to Admission Medications   Prescriptions Last Dose Informant Patient Reported? Taking? Cholecalciferol 125 MCG (5000 UT) capsule   Yes No   Sig: Take 5,000 Units by mouth daily   erythromycin (ILOTYCIN) ophthalmic ointment   No No   Sig: Place a 1/2 inch ribbon of ointment into the left lower eyelid 4 times a day for 5 days     ibuprofen (MOTRIN) 600 mg tablet   No No   Sig: Take 1 tablet (600 mg total) by mouth every 6 (six) hours as needed for mild pain   ibuprofen (MOTRIN) 800 mg tablet   Yes No   Sig: Take 800 mg by mouth every 6 (six) hours   meloxicam (MOBIC) 15 mg tablet   Yes No   Sig: Take 15 mg by mouth daily   omeprazole (PriLOSEC) 20 mg delayed release capsule   Yes No   Sig: Take 20 mg by mouth daily   sucralfate (CARAFATE) 1 g tablet   No No   Sig: Take 1 tablet (1 g total) by mouth 4 (four) times a day for 14 days      Facility-Administered Medications: None     No Known Allergies    Objective   First Vitals:   Blood Pressure: 126/67 (10/19/22 0537)  Pulse: 59 (10/19/22 0537)  Temperature: 98 1 °F (36 7 °C) (10/19/22 0537)  Temp Source: Oral (10/19/22 0537)  Respirations: 17 (10/19/22 0537)  Height: 5' 3" (160 cm) (10/19/22 0537)  Weight - Scale: 70 3 kg (155 lb) (10/19/22 0537)  SpO2: 98 % (10/19/22 0537)    Current Vitals:   Blood Pressure: 126/67 (10/19/22 0537)  Pulse: 59 (10/19/22 0537)  Temperature: 98 1 °F (36 7 °C) (10/19/22 0537)  Temp Source: Oral (10/19/22 0537)  Respirations: 17 (10/19/22 0537)  Height: 5' 3" (160 cm) (10/19/22 0537)  Weight - Scale: 70 3 kg (155 lb) (10/19/22 0537)  SpO2: 98 % (10/19/22 0537)    No intake or output data in the 24 hours ending 10/19/22 0609    Invasive Devices  Report    None                 Physical Exam  Vitals and nursing note reviewed  Constitutional:       General: He is not in acute distress  Appearance: Normal appearance  He is well-developed  HENT:      Head: Normocephalic and atraumatic  Right Ear: External ear normal       Left Ear: External ear normal       Nose: Nose normal       Mouth/Throat:      Pharynx: No oropharyngeal exudate  Comments: No trismus  Eyes:      Conjunctiva/sclera: Conjunctivae normal       Pupils: Pupils are equal, round, and reactive to light  Cardiovascular:      Rate and Rhythm: Normal rate and regular rhythm  Pulmonary:      Effort: Pulmonary effort is normal  No respiratory distress  Abdominal:      General: Abdomen is flat  There is no distension  Palpations: Abdomen is soft  Musculoskeletal:         General: No deformity  Normal range of motion  Cervical back: Normal range of motion and neck supple  Skin:     General: Skin is warm and dry  Capillary Refill: Capillary refill takes less than 2 seconds  Neurological:      General: No focal deficit present  Mental Status: He is alert and oriented to person, place, and time  Mental status is at baseline  Coordination: Coordination normal    Psychiatric:         Mood and Affect: Mood normal          Behavior: Behavior normal          Thought Content: Thought content normal          Judgment: Judgment normal            Medical Decision Makin  Left facial swelling postoperative from dental work:  NSAIDs p r n  Pain; follow-up with dentist     No results found for this or any previous visit (from the past 39 hour(s))    No orders to display         Portions of the record may have been created with voice recognition software  Occasional wrong word or "sound a like" substitutions may have occurred due to the inherent limitations of voice recognition software  Read the chart carefully and recognize, using context, where substitutions have occurred            Critical Care Time  Procedures

## 2022-10-19 NOTE — Clinical Note
Jasmeetjareth Haass was seen and treated in our emergency department on 10/19/2022  Diagnosis: Post-op facial swelling and pain    Rhoda Centeno  is off the rest of the shift today, may return to work on return date  He may return on this date: 10/20/2022         If you have any questions or concerns, please don't hesitate to call        Mariano Villegas, DO    ______________________________           _______________          _______________  Hospital Representative                              Date                                Time

## 2022-10-19 NOTE — DISCHARGE INSTRUCTIONS
You may take tylenol and motrin and use ice as needed for pain and swelling  Please follow up with your dental surgeon  Return to the emergency department for any new or worsening symptoms including worsening swelling, difficulty opening your jaw, difficulty swallowing or breathing, or other concerning symptoms

## 2022-10-19 NOTE — ED PROVIDER NOTES
History  Chief Complaint   Patient presents with   • Oral Swelling     Pt s/p implant prep on Monday presents with swelling to left left of mouth and cheek which started last night  Patient is a 79-year-old male who presents for evaluation of left face swelling after a dental procedure on Monday  Patient states that he had screws placed in his left upper jaw in preparation for dental implants  Patient states that after the procedure, he was feeling well, did not have much swelling  Patient's wife noticed overnight tonight that the left side of his face was becoming more swollen  As the swelling is increased, patient has been experiencing increased discomfort in the area  Patient states that he has been taking Tylenol for pain, states this is not helping  He denies any difficulty with swallowing or breathing  Denies any recent fevers or chills  Denies any neck pain or swelling  Prior to Admission Medications   Prescriptions Last Dose Informant Patient Reported? Taking? Cholecalciferol 125 MCG (5000 UT) capsule   Yes No   Sig: Take 5,000 Units by mouth daily   erythromycin (ILOTYCIN) ophthalmic ointment   No No   Sig: Place a 1/2 inch ribbon of ointment into the left lower eyelid 4 times a day for 5 days  ibuprofen (MOTRIN) 600 mg tablet   No No   Sig: Take 1 tablet (600 mg total) by mouth every 6 (six) hours as needed for mild pain   ibuprofen (MOTRIN) 800 mg tablet   Yes No   Sig: Take 800 mg by mouth every 6 (six) hours   meloxicam (MOBIC) 15 mg tablet   Yes No   Sig: Take 15 mg by mouth daily   omeprazole (PriLOSEC) 20 mg delayed release capsule   Yes No   Sig: Take 20 mg by mouth daily   sucralfate (CARAFATE) 1 g tablet   No No   Sig: Take 1 tablet (1 g total) by mouth 4 (four) times a day for 14 days      Facility-Administered Medications: None       History reviewed  No pertinent past medical history  History reviewed  No pertinent surgical history  History reviewed   No pertinent family history  I have reviewed and agree with the history as documented  E-Cigarette/Vaping   • E-Cigarette Use Never User      E-Cigarette/Vaping Substances   • Nicotine No    • THC No    • CBD No    • Flavoring No    • Other No    • Unknown No      Social History     Tobacco Use   • Smoking status: Never Smoker   • Smokeless tobacco: Never Used   Vaping Use   • Vaping Use: Never used   Substance Use Topics   • Alcohol use: Not Currently   • Drug use: Not Currently        Review of Systems   Constitutional: Negative for chills and fever  HENT: Positive for facial swelling  Negative for trouble swallowing  Respiratory: Negative  Cardiovascular: Negative  Gastrointestinal: Negative  Genitourinary: Negative  Musculoskeletal: Negative  Skin: Negative  Neurological: Negative  All other systems reviewed and are negative  Physical Exam  ED Triage Vitals [10/19/22 0537]   Temperature Pulse Respirations Blood Pressure SpO2   98 1 °F (36 7 °C) 59 17 126/67 98 %      Temp Source Heart Rate Source Patient Position - Orthostatic VS BP Location FiO2 (%)   Oral Monitor Sitting Right arm --      Pain Score       8             Orthostatic Vital Signs  Vitals:    10/19/22 0537   BP: 126/67   Pulse: 59   Patient Position - Orthostatic VS: Sitting       Physical Exam  Vitals and nursing note reviewed  Constitutional:       General: He is awake  He is not in acute distress  Appearance: He is not toxic-appearing  HENT:      Head: Normocephalic and atraumatic  Jaw: No trismus  Comments: Swelling of the left cheek  No overlying erythema or warmth  Mouth/Throat:      Lips: Pink  Mouth: Mucous membranes are moist       Comments: Left upper jaw with 2 sutures in place  No surrounding swelling  Swelling of left cheek, no signs of intraoral infection  Eyes:      General: Vision grossly intact  Gaze aligned appropriately     Cardiovascular:      Rate and Rhythm: Normal rate and regular rhythm  Pulmonary:      Effort: Pulmonary effort is normal  No respiratory distress  Musculoskeletal:      Cervical back: Full passive range of motion without pain and neck supple  Skin:     General: Skin is warm and dry  Neurological:      General: No focal deficit present  Mental Status: He is alert and oriented to person, place, and time  ED Medications  Medications   ketorolac (TORADOL) injection 15 mg (15 mg Intramuscular Given 10/19/22 0609)       Diagnostic Studies  Results Reviewed     None                 No orders to display         Procedures  Procedures      ED Course                             SBIRT 20yo+    Flowsheet Row Most Recent Value   SBIRT (23 yo +)    In order to provide better care to our patients, we are screening all of our patients for alcohol and drug use  Would it be okay to ask you these screening questions? Yes Filed at: 10/19/2022 0156   Initial Alcohol Screen: US AUDIT-C     1  How often do you have a drink containing alcohol? 0 Filed at: 10/19/2022 0606   2  How many drinks containing alcohol do you have on a typical day you are drinking? 0 Filed at: 10/19/2022 0606   3a  Male UNDER 65: How often do you have five or more drinks on one occasion? 0 Filed at: 10/19/2022 0606   3b  FEMALE Any Age, or MALE 65+: How often do you have 4 or more drinks on one occassion? 0 Filed at: 10/19/2022 0606   Audit-C Score 0 Filed at: 10/19/2022 7071   ANGELO: How many times in the past year have you    Used an illegal drug or used a prescription medication for non-medical reasons? Never Filed at: 10/19/2022 0606                MDM  Number of Diagnoses or Management Options  Left facial swelling: new and does not require workup  Diagnosis management comments: 50-year-old male presents for evaluation of left face swelling 2 days after dental procedure  Exam, patient noted to have swelling of the left cheek, no obvious signs of infection or abscess    Surgical site appears to be well healing  Patient given ice pack and IM Toradol for pain and swelling  Patient told to follow-up with dentist   Discharged home in stable condition with symptomatic care instructions and strict ED return precautions  Disposition  Final diagnoses:   Left facial swelling     Time reflects when diagnosis was documented in both MDM as applicable and the Disposition within this note     Time User Action Codes Description Comment    10/19/2022  6:09 AM Land Kana Add [R22 0] Left facial swelling       ED Disposition     ED Disposition   Discharge    Condition   Stable    Date/Time   Wed Oct 19, 2022  6:09 AM    Comment   Renata Villanueva Dr discharge to home/self care  Follow-up Information     Follow up With Specialties Details Why Contact Info Additional Information    Meeta Mortensen PA-C Physician Assistant Schedule an appointment as soon as possible for a visit  As needed [address], [address] [facility] [address] [address] [address] Emergency Medicine Go to  If symptoms worsen [address] [phone]  [address] [facility], [address] [place] [address], [address]          Patient's Medications   Discharge Prescriptions    No medications on file     No discharge procedures on file  PDMP Review     None           ED Provider  Attending physically available and evaluated Renaat Mustafa I managed the patient along with the ED Attending      Electronically Signed by         Noemí Galaviz DO  10/19/22 6927

## 2022-10-25 ENCOUNTER — OFFICE VISIT (OUTPATIENT)
Dept: FAMILY MEDICINE CLINIC | Facility: CLINIC | Age: 57
End: 2022-10-25
Payer: COMMERCIAL

## 2022-10-25 VITALS
RESPIRATION RATE: 20 BRPM | TEMPERATURE: 97.8 F | OXYGEN SATURATION: 99 % | HEIGHT: 61 IN | WEIGHT: 146 LBS | BODY MASS INDEX: 27.56 KG/M2 | DIASTOLIC BLOOD PRESSURE: 70 MMHG | HEART RATE: 64 BPM | SYSTOLIC BLOOD PRESSURE: 120 MMHG

## 2022-10-25 DIAGNOSIS — N52.9 ERECTILE DYSFUNCTION, UNSPECIFIED ERECTILE DYSFUNCTION TYPE: ICD-10-CM

## 2022-10-25 DIAGNOSIS — Z00.01 ENCOUNTER FOR GENERAL ADULT MEDICAL EXAMINATION WITH ABNORMAL FINDINGS: Primary | ICD-10-CM

## 2022-10-25 DIAGNOSIS — Z11.4 SCREENING FOR HUMAN IMMUNODEFICIENCY VIRUS: ICD-10-CM

## 2022-10-25 DIAGNOSIS — Z11.59 NEED FOR HEPATITIS C SCREENING TEST: ICD-10-CM

## 2022-10-25 DIAGNOSIS — Z12.11 COLON CANCER SCREENING: ICD-10-CM

## 2022-10-25 DIAGNOSIS — R97.20 PSA ELEVATION: ICD-10-CM

## 2022-10-25 PROCEDURE — 99386 PREV VISIT NEW AGE 40-64: CPT | Performed by: FAMILY MEDICINE

## 2022-10-25 RX ORDER — TADALAFIL 20 MG/1
20 TABLET ORAL DAILY PRN
Qty: 10 TABLET | Refills: 5 | Status: SHIPPED | OUTPATIENT
Start: 2022-10-25

## 2022-10-25 NOTE — PROGRESS NOTES
Name: Kory Santamaria      : 1965      MRN: [id number]  Encounter Provider: Rosales Ocasio MD  Encounter Date: 10/25/2022   Encounter department:   [facility] [place] [address]     1  Encounter for general adult medical examination with abnormal findings  -     Lipid panel; Future    2  BMI 27 0-27 9,adult    3  Colon cancer screening  -     Ambulatory Referral to Gastroenterology; Future    4  Screening for human immunodeficiency virus  -     HIV 1/2 Antigen/Antibody (4th Generation) w Reflex [facility]; Future    5  Need for hepatitis C screening test  -     Hepatitis C antibody; Future    6  PSA elevation  -     PSA, total and free; Future           Subjective      Patient is here for PE, not having any acute distress  Fatigue: [place] for the past 8 weeks, Feeling tired, occasionally SOB, muscle weakness, difficult to get up in am, difficult to stay asleep  Affecting daily life  Review of Systems   Constitutional: Negative for diaphoresis, fatigue, fever and unexpected weight change  Respiratory: Negative for apnea, cough, choking, chest tightness and shortness of breath  Cardiovascular: Negative for chest pain, palpitations and leg swelling  Gastrointestinal: Negative for abdominal distention, abdominal pain, anal bleeding, blood in stool and constipation  Musculoskeletal: Negative for arthralgias, back pain, gait problem and joint swelling  Neurological: Negative for dizziness, facial asymmetry, light-headedness and headaches  Psychiatric/Behavioral: Negative for behavioral problems, dysphoric mood and self-injury  The patient is not nervous/anxious          Current Outpatient Medications on File Prior to Visit   Medication Sig   • [DISCONTINUED] Cholecalciferol 125 MCG (5000 UT) capsule Take 5,000 Units by mouth daily   • [DISCONTINUED] erythromycin (ILOTYCIN) ophthalmic ointment Place a 1/2 inch ribbon of ointment into the left lower eyelid 4 times a day for 5 days  • [DISCONTINUED] ibuprofen (MOTRIN) 600 mg tablet Take 1 tablet (600 mg total) by mouth every 6 (six) hours as needed for mild pain   • [DISCONTINUED] ibuprofen (MOTRIN) 800 mg tablet Take 800 mg by mouth every 6 (six) hours   • [DISCONTINUED] meloxicam (MOBIC) 15 mg tablet Take 15 mg by mouth daily   • [DISCONTINUED] omeprazole (PriLOSEC) 20 mg delayed release capsule Take 20 mg by mouth daily   • [DISCONTINUED] sucralfate (CARAFATE) 1 g tablet Take 1 tablet (1 g total) by mouth 4 (four) times a day for 14 days       Objective     /70 (BP Location: Left arm, Patient Position: Sitting, Cuff Size: Standard)   Pulse 64   Temp 97 8 °F (36 6 °C) (Temporal)   Resp 20   Ht 5' 1" (1 549 m)   Wt 66 2 kg (146 lb)   SpO2 99%   BMI 27 59 kg/m²     Physical Exam  Vitals and nursing note reviewed  Neck:      Thyroid: No thyroid mass or thyromegaly  Vascular: No carotid bruit or JVD  Trachea: No tracheal tenderness  Cardiovascular:      Rate and Rhythm: Normal rate and regular rhythm  No extrasystoles are present  Pulses: Normal pulses  Heart sounds: Normal heart sounds  Heart sounds not distant  No friction rub  Pulmonary:      Effort: Pulmonary effort is normal  No tachypnea or bradypnea  Breath sounds: Normal breath sounds  No stridor  Abdominal:      General: Bowel sounds are normal  There is no abdominal bruit  Palpations: Abdomen is soft  There is no hepatomegaly or splenomegaly  Hernia: No hernia is present  Musculoskeletal:         General: Normal range of motion  Cervical back: No edema or rigidity  Skin:     General: Skin is warm and dry  Neurological:      Mental Status: He is oriented to person, place, and time  Deep Tendon Reflexes: Reflexes are normal and symmetric  Psychiatric:         Behavior: Behavior normal          Thought Content:  Thought content normal          Judgment: Judgment normal  Jaqui Lynch MD  BMI Counseling: Body mass index is 27 59 kg/m²  The BMI is above normal  Nutrition recommendations include consuming healthier snacks, decreasing soda and/or juice intake and moderation in carbohydrate intake  Exercise recommendations include exercising 3-5 times per week

## 2022-10-29 ENCOUNTER — LAB (OUTPATIENT)
Dept: LAB | Facility: HOSPITAL | Age: 57
End: 2022-10-29

## 2022-10-29 DIAGNOSIS — R97.20 PSA ELEVATION: ICD-10-CM

## 2022-10-29 DIAGNOSIS — Z11.59 NEED FOR HEPATITIS C SCREENING TEST: ICD-10-CM

## 2022-10-29 DIAGNOSIS — Z00.01 ENCOUNTER FOR GENERAL ADULT MEDICAL EXAMINATION WITH ABNORMAL FINDINGS: ICD-10-CM

## 2022-10-29 DIAGNOSIS — Z11.4 SCREENING FOR HUMAN IMMUNODEFICIENCY VIRUS: ICD-10-CM

## 2022-10-29 LAB
CHOLEST SERPL-MCNC: 155 MG/DL
HCV AB SER QL: NORMAL
HDLC SERPL-MCNC: 39 MG/DL
LDLC SERPL CALC-MCNC: 85 MG/DL
NONHDLC SERPL-MCNC: 116 MG/DL
TRIGL SERPL-MCNC: 153 MG/DL

## 2022-10-30 LAB — HIV 1+2 AB+HIV1 P24 AG SERPL QL IA: NORMAL

## 2022-11-01 DIAGNOSIS — R97.20 PSA ELEVATION: Primary | ICD-10-CM

## 2022-11-01 LAB
PSA FREE MFR SERPL: 5.7 %
PSA FREE SERPL-MCNC: 0.83 NG/ML
PSA SERPL-MCNC: 14.5 NG/ML (ref 0–4)

## 2022-11-01 NOTE — RESULT ENCOUNTER NOTE
Dear Yulia Thomas: The prostate test, PSA, is elevated 14 5 ng/ml  I am requesting consultation with Urology  Please check in your MyChart the referral or stop by in our office for a copy  Also you may contact to below phone number for an appointment    Erick Sales MD    [address]  [address] [id number]  Phone: [phone]

## 2022-11-08 ENCOUNTER — TELEPHONE (OUTPATIENT)
Dept: FAMILY MEDICINE CLINIC | Facility: CLINIC | Age: 57
End: 2022-11-08

## 2022-11-08 ENCOUNTER — TELEPHONE (OUTPATIENT)
Dept: UROLOGY | Facility: AMBULATORY SURGERY CENTER | Age: 57
End: 2022-11-08

## 2022-11-08 DIAGNOSIS — N52.9 ERECTILE DYSFUNCTION, UNSPECIFIED ERECTILE DYSFUNCTION TYPE: ICD-10-CM

## 2022-11-08 RX ORDER — TADALAFIL 20 MG/1
20 TABLET ORAL DAILY PRN
Qty: 10 TABLET | Refills: 5 | Status: SHIPPED | OUTPATIENT
Start: 2022-11-08

## 2022-11-08 NOTE — TELEPHONE ENCOUNTER
Please Triage  New Patient    What is the reason for the patient’s appointment? Referral for R97 20 (ICD-10-CM) - PSA elevation    What office location does the patient prefer? [place]     Imaging/Lab Results:    Do we accept the patient's insurance or is the patient Self-Pay? Insurance Provider: capital   Plan Type/Number:  Member ID#: Has the patient had any previous Urologist(s)? no    Have patient records been requested?   If not are records showing in Epic:     Has the patient had any outside testing done? no    Does the patient have a personal history of cancer? no      appt 12/15/22

## 2022-11-10 ENCOUNTER — CONSULT (OUTPATIENT)
Dept: GASTROENTEROLOGY | Facility: CLINIC | Age: 57
End: 2022-11-10

## 2022-11-10 VITALS
HEIGHT: 62 IN | SYSTOLIC BLOOD PRESSURE: 121 MMHG | WEIGHT: 145 LBS | TEMPERATURE: 98 F | HEART RATE: 77 BPM | DIASTOLIC BLOOD PRESSURE: 85 MMHG | BODY MASS INDEX: 26.68 KG/M2

## 2022-11-10 DIAGNOSIS — R10.13 DYSPEPSIA: Primary | ICD-10-CM

## 2022-11-10 DIAGNOSIS — Z12.11 COLON CANCER SCREENING: ICD-10-CM

## 2022-11-10 NOTE — PATIENT INSTRUCTIONS
Scheduled date of colonoscopy/egd  (as of today): 1/26/23   Physician performing colonoscopy: Dr Edward France  Location of colonoscopy: [facility]   Bowel prep reviewed with patient: golytely/dulcolax  Instructions reviewed with patient by: Melissa Jackson  Clearances:   n/a

## 2022-11-10 NOTE — PROGRESS NOTES
[facility] - Outpatient Consultation  Charlene Alberts 65 y o  male MRN: [id number]  Encounter: [id number]          ASSESSMENT AND PLAN:      1  Colon cancer screening  - Patient is 65 y o , he has been screened for colon cancer in the past, most recent colonoscopy was about 6 years ago in [place] however the records are not available and the patient is not aware if polyps were found during the procedure, he denies any family history of GI malignancy or IBD, no alarm symptoms at this point, currently having normal bowel movements  - Currently average risk for colonoscopy, other options for colorectal cancer screening were discussed with the patient, will perform colonoscopy, risk and benefits of the procedure were discussed with the patient including but not limited to bleeding, infection, perforation and missing an adenoma      2  Dyspepsia  Patient is complaining about epigastric pain as well as heartburn  The symptoms are usually brought up by spicy food or beer  He does not drink beers very often  These symptoms have been ongoing for many months  No other alarm symptoms such as weight loss, bleeding  Will perform EGD with gastric biopsies to rule out H pylori, risk and benefits of the procedure were discussed with the patient including but not limited to bleeding, infection, perforation    ______________________________________________________________________    HPI:  Patient seen and examined, he is a 55-year-old male patient referred by his PCP due to the presence of dyspepsia and for colorectal cancer screening, he is describing epigastric abdominal pain usually exacerbated by spicy food or by beer, he drinks beer occasionally, in the symptoms have been present for many months, otherwise he denies any recent events, currently is tolerating PO route, denies nausea or vomiting, is passing flatus and having daily bowel movements of normal consistency, denies abdominal pain, no recent weight loss      REVIEW OF SYSTEMS:    CONSTITUTIONAL: Denies any fever, chills, rigors, and weight loss  HEENT: No earache or tinnitus  Denies hearing loss or visual disturbances  CARDIOVASCULAR: No chest pain or palpitations  RESPIRATORY: Denies any cough, hemoptysis, shortness of breath or dyspnea on exertion  GASTROINTESTINAL: As noted in the History of Present Illness  GENITOURINARY: No problems with urination  Denies any hematuria or dysuria  NEUROLOGIC: No dizziness or vertigo, denies headaches  MUSCULOSKELETAL: Denies any muscle or joint pain  SKIN: Denies skin rashes or itching  ENDOCRINE: Denies excessive thirst  Denies intolerance to heat or cold  PSYCHOSOCIAL: Denies depression or anxiety  Denies any recent memory loss  Historical Information   Past Medical History:   Diagnosis Date   • Elevated PSA    • Hepatic steatosis    • Hepatomegaly    • High cholesterol      Past Surgical History:   Procedure Laterality Date   • HERNIA REPAIR     • PROSTATE BIOPSY  08/2021    benign     Social History   Social History     Substance and Sexual Activity   Alcohol Use Yes     Social History     Substance and Sexual Activity   Drug Use Never     Social History     Tobacco Use   Smoking Status Never Smoker   Smokeless Tobacco Never Used     Family History   Problem Relation Age of Onset   • Cancer Mother        Meds/Allergies       Current Outpatient Medications:   •  bisacodyl (DULCOLAX) 5 mg EC tablet  •  polyethylene glycol (GOLYTELY) 4000 mL solution  •  tadalafil (CIALIS) 20 MG tablet    No Known Allergies        Objective     Blood pressure 121/85, pulse 77, temperature 98 °F (36 7 °C), temperature source Tympanic, height 5' 2" (1 575 m), weight 65 8 kg (145 lb)  Body mass index is 26 52 kg/m²          PHYSICAL EXAM:      General Appearance:   Alert, cooperative, no distress   HEENT:   Normocephalic, atraumatic, anicteric      Neck:  Supple, symmetrical, trachea midline   Lungs:   Clear to auscultation bilaterally; no rales, rhonchi or wheezing; respirations unlabored    Heart[de-identified]   Regular rate and rhythm; no murmur, rub, or gallop  Abdomen:   Soft, mildly tender in the epigastrium and right lower quadrant, non-distended; normal bowel sounds; no masses, no organomegaly    Genitalia:   Deferred    Rectal:   Deferred    Extremities:  No cyanosis, clubbing or edema    Pulses:  2+ and symmetric    Skin:  No jaundice, rashes, or lesions    Lymph nodes:  No palpable cervical lymphadenopathy        Lab Results:   No visits with results within 1 Day(s) from this visit  Latest known visit with results is:   Lab on 10/29/2022   Component Date Value   • Hepatitis C Ab 10/29/2022 Non-reactive    • HIV-1/HIV-2 Ab 10/29/2022 Non-Reactive    • Cholesterol 10/29/2022 155    • Triglycerides 10/29/2022 153 (A)   • HDL, Direct 10/29/2022 39 (A)   • LDL Calculated 10/29/2022 85    • Non-HDL-Chol (CHOL-HDL) 10/29/2022 116    • Prostate Specific Antige* 10/29/2022 14 5 (A)   • PSA, Free 10/29/2022 0 83    • PSA, Free Pct 10/29/2022 5 7          Radiology Results:   No results found

## 2022-12-15 ENCOUNTER — OFFICE VISIT (OUTPATIENT)
Dept: UROLOGY | Facility: AMBULATORY SURGERY CENTER | Age: 57
End: 2022-12-15

## 2022-12-15 VITALS
WEIGHT: 148 LBS | HEART RATE: 68 BPM | BODY MASS INDEX: 27.07 KG/M2 | SYSTOLIC BLOOD PRESSURE: 102 MMHG | DIASTOLIC BLOOD PRESSURE: 62 MMHG | OXYGEN SATURATION: 97 %

## 2022-12-15 DIAGNOSIS — R97.20 PSA ELEVATION: ICD-10-CM

## 2022-12-15 DIAGNOSIS — R97.20 ELEVATED PSA: Primary | ICD-10-CM

## 2022-12-15 NOTE — PROGRESS NOTES
12/15/22    Lencho Kenyonjenniferogomero   1965   [id number]       Assessment  1 Elevated PSA s/p negative biopsy (8/9/21)     Discussion/Plan  1 Elevated PSA s/p negative biopsy (8/9/21)    10/29/22 PSA 14 5   Discussed observation of PSA, mpMRI prostate, or biopsy   Reviewed causes for false elevation of PSA   Consider Finasteride      Patient wishes to pursue mpMRI prostate to further evaluate due to rising PSA  This will allow us to pursue an MRI fusion biopsy for targeted tissue sampling if indicated based on MRI results  Emotional support provided  Subjective  HPI   Alycia Leslie is a 65year old male who presents to establish care  He has history of elevated PSA s/p negative prostate biopsy in 2021 at [facility]  He states he previously received medical care in [place]  His PSA is historically elevated (Care Everywhere) He denies lower urinary tract symptoms  He denies gross hematuria, dysuria, pelvic pain, or unintentional weight loss  He denies a family history of prostate cancer  Patient states it was recommended that he repeat a biopsy a year after his original tissue sampling  He is prescribed Tadalafil PRN for sexual activity however he does not this regularly  He is not maintained on medication for LUTS  Prostate volume on TRUS was measured to be 24 cc in August 2021  PSA trend   7/12/21 PSA 10 50  5/13/21 PSA 12 50   8/9/21 negative biopsy     Component      Latest Ref Rng & Units 10/29/2022           9:27 AM   PSA, Total      0 0 - 4 0 ng/mL 14 5 (H)       Biopsy at [facility], RIGHT BASE LATERAL, NEEDLE CORE BIOPSY:   Benign prostatic tissue  Teetee Ralph, RIGHT BASE MEDIAL, NEEDLE CORE BIOPSY:   Benign prostatic tissue; see comment  Teetee Ralph, RIGHT MID LATERAL, NEEDLE CORE BIOPSY:   Benign prostatic tissue  D   PROSTATE, RIGHT MID MEDIAL, NEEDLE CORE BIOPSY:   Benign prostatic tissue  Teetee Ralph, RIGHT APEX LATERAL, NEEDLE CORE BIOPSY:   Benign prostatic tissue  F   PROSTATE, RIGHT APEX MEDIAL, NEEDLE CORE BIOPSY:   Benign prostatic tissue  Ramey Toni, LEFT BASE LATERAL, NEEDLE CORE BIOPSY:   Benign prostatic tissue  Ramey Toni, LEFT BASE MEDIAL, NEEDLE CORE BIOPSY:   Benign prostatic tissue; see comment  I   PROSTATE, LEFT MID LATERAL, NEEDLE CORE BIOPSY:   Benign prostatic tissue  Ramey Toni, LEFT MID MEDIAL, NEEDLE CORE BIOPSY:   Benign prostatic tissue  Ramey Toni, LEFT APEX LATERAL, NEEDLE CORE BIOPSY:   Benign prostatic tissue  Ramey Toni, LEFT APEX MEDIAL, NEEDLE CORE BIOPSY:   Benign prostatic tissue  Review of Systems - History obtained from chart review and the patient  General ROS: negative  Psychological ROS: negative  Respiratory ROS: no cough, shortness of breath, or wheezing  Cardiovascular ROS: no chest pain or dyspnea on exertion  Gastrointestinal ROS: no abdominal pain, change in bowel habits, or black or bloody stools  Genito-Urinary ROS: no dysuria, trouble voiding, or hematuria  Musculoskeletal ROS: negative  Neurological ROS: negative  Dermatological ROS: negative       Objective  Physical Exam  Vitals and nursing note reviewed  Constitutional:       General: He is awake  He is not in acute distress  Appearance: Normal appearance  He is well-developed, well-groomed and normal weight  He is not ill-appearing, toxic-appearing or diaphoretic  Pulmonary:      Effort: Pulmonary effort is normal    Abdominal:      Tenderness: There is no right CVA tenderness or left CVA tenderness  Musculoskeletal:         General: Normal range of motion  Cervical back: Normal range of motion and neck supple  Skin:     General: Skin is warm  Neurological:      General: No focal deficit present  Mental Status: He is alert and oriented to person, place, and time  Mental status is at baseline     Psychiatric:         Attention and Perception: Attention normal          Mood and Affect: Mood normal          Speech: Speech normal          Behavior: Behavior normal  Behavior is cooperative  Thought Content: Thought content normal          Cognition and Memory: Cognition normal          Judgment: Judgment normal               VIRAL Montes     I have spent 15 minutes with Patient  today in which greater than 50% of this time was spent in counseling/coordination of care regarding Diagnostic results, Risks and benefits of tx options, Intructions for management and Patient and family education

## 2023-01-26 ENCOUNTER — HOSPITAL ENCOUNTER (OUTPATIENT)
Dept: GASTROENTEROLOGY | Facility: HOSPITAL | Age: 58
Setting detail: OUTPATIENT SURGERY
End: 2023-01-26
Attending: INTERNAL MEDICINE

## 2023-01-26 ENCOUNTER — ANESTHESIA EVENT (OUTPATIENT)
Dept: GASTROENTEROLOGY | Facility: HOSPITAL | Age: 58
End: 2023-01-26

## 2023-01-26 ENCOUNTER — ANESTHESIA (OUTPATIENT)
Dept: GASTROENTEROLOGY | Facility: HOSPITAL | Age: 58
End: 2023-01-26

## 2023-01-26 VITALS
SYSTOLIC BLOOD PRESSURE: 89 MMHG | HEART RATE: 60 BPM | RESPIRATION RATE: 18 BRPM | DIASTOLIC BLOOD PRESSURE: 56 MMHG | TEMPERATURE: 96.6 F | OXYGEN SATURATION: 96 %

## 2023-01-26 DIAGNOSIS — R10.13 DYSPEPSIA: ICD-10-CM

## 2023-01-26 DIAGNOSIS — K29.00 ACUTE SUPERFICIAL GASTRITIS WITHOUT HEMORRHAGE: Primary | ICD-10-CM

## 2023-01-26 DIAGNOSIS — Z12.11 COLON CANCER SCREENING: ICD-10-CM

## 2023-01-26 PROBLEM — K21.9 GASTROESOPHAGEAL REFLUX DISEASE WITHOUT ESOPHAGITIS: Status: ACTIVE | Noted: 2021-09-21

## 2023-01-26 PROBLEM — E78.1 PURE HYPERTRIGLYCERIDEMIA: Status: ACTIVE | Noted: 2021-09-21

## 2023-01-26 PROBLEM — R97.20 ELEVATED PSA: Status: ACTIVE | Noted: 2021-07-19

## 2023-01-26 RX ORDER — PROPOFOL 10 MG/ML
INJECTION, EMULSION INTRAVENOUS AS NEEDED
Status: DISCONTINUED | OUTPATIENT
Start: 2023-01-26 | End: 2023-01-26

## 2023-01-26 RX ORDER — SODIUM CHLORIDE 9 MG/ML
INJECTION, SOLUTION INTRAVENOUS CONTINUOUS PRN
Status: DISCONTINUED | OUTPATIENT
Start: 2023-01-26 | End: 2023-01-26

## 2023-01-26 RX ORDER — ONDANSETRON 2 MG/ML
4 INJECTION INTRAMUSCULAR; INTRAVENOUS ONCE AS NEEDED
Status: CANCELLED | OUTPATIENT
Start: 2023-01-26

## 2023-01-26 RX ORDER — SODIUM CHLORIDE 9 MG/ML
20 INJECTION, SOLUTION INTRAVENOUS CONTINUOUS
Status: CANCELLED | OUTPATIENT
Start: 2023-01-26

## 2023-01-26 RX ORDER — PROPOFOL 10 MG/ML
INJECTION, EMULSION INTRAVENOUS CONTINUOUS PRN
Status: DISCONTINUED | OUTPATIENT
Start: 2023-01-26 | End: 2023-01-26

## 2023-01-26 RX ORDER — SODIUM CHLORIDE 9 MG/ML
125 INJECTION, SOLUTION INTRAVENOUS CONTINUOUS
Status: CANCELLED | OUTPATIENT
Start: 2023-01-26

## 2023-01-26 RX ORDER — OMEPRAZOLE 40 MG/1
40 CAPSULE, DELAYED RELEASE ORAL DAILY
Qty: 30 CAPSULE | Refills: 3 | Status: SHIPPED | OUTPATIENT
Start: 2023-01-26 | End: 2023-02-25

## 2023-01-26 RX ORDER — LIDOCAINE HYDROCHLORIDE 10 MG/ML
INJECTION, SOLUTION EPIDURAL; INFILTRATION; INTRACAUDAL; PERINEURAL AS NEEDED
Status: DISCONTINUED | OUTPATIENT
Start: 2023-01-26 | End: 2023-01-26

## 2023-01-26 RX ADMIN — PROPOFOL 120 MG: 10 INJECTION, EMULSION INTRAVENOUS at 12:21

## 2023-01-26 RX ADMIN — PROPOFOL 120 MCG/KG/MIN: 10 INJECTION, EMULSION INTRAVENOUS at 12:21

## 2023-01-26 RX ADMIN — SODIUM CHLORIDE: 9 INJECTION, SOLUTION INTRAVENOUS at 12:18

## 2023-01-26 RX ADMIN — LIDOCAINE HYDROCHLORIDE 5 ML: 10 INJECTION, SOLUTION EPIDURAL; INFILTRATION; INTRACAUDAL; PERINEURAL at 12:21

## 2023-01-26 NOTE — ANESTHESIA PREPROCEDURE EVALUATION
Procedure:  COLONOSCOPY  EGD    Relevant Problems   CARDIO   (+) Pure hypertriglyceridemia      GI/HEPATIC   (+) Gastroesophageal reflux disease without esophagitis      Other   (+) Elevated PSA        Physical Exam    Airway    Mallampati score: III  TM Distance: >3 FB  Neck ROM: full     Dental       Cardiovascular      Pulmonary      Other Findings        Anesthesia Plan  ASA Score- 2     Anesthesia Type- IV sedation with anesthesia with ASA Monitors  Additional Monitors:   Airway Plan:           Plan Factors-    Chart reviewed  EKG reviewed  Existing labs reviewed  Patient summary reviewed  Induction- intravenous  Postoperative Plan-     Informed Consent- Anesthetic plan and risks discussed with patient  I personally reviewed this patient with the CRNA  Discussed and agreed on the Anesthesia Plan with the CRNA  Bethany Tena

## 2023-01-26 NOTE — ANESTHESIA POSTPROCEDURE EVALUATION
Post-Op Assessment Note    CV Status:  Stable  Pain Score: 0    Pain management: adequate     Mental Status:  Sleepy   Hydration Status:  Euvolemic   PONV Controlled:  Controlled   Airway Patency:  Patent      Post Op Vitals Reviewed: Yes      Staff: Anesthesiologist, CRNA         No notable events documented      BP   82/50   Temp (!) 96 6 °F (35 9 °C) (01/26/23 1302)    Pulse 57 (01/26/23 1302)   Resp 18 (01/26/23 1302)    SpO2 98 % (01/26/23 1302) oral airway and face mask

## 2023-01-26 NOTE — H&P
History and Physical -  [facility]  Leonora Tejeda 65 y o  male MRN: [id number]    HPI: Leonora Tejeda is a 65y o  year old male who presents with dyspepsia and for colon cancer screening  Review of Systems    Historical Information   Past Medical History:   Diagnosis Date   • Elevated PSA    • Hepatic steatosis    • Hepatomegaly    • High cholesterol      Past Surgical History:   Procedure Laterality Date   • HERNIA REPAIR     • PROSTATE BIOPSY  08/2021    benign     Social History   Social History     Substance and Sexual Activity   Alcohol Use Yes     Social History     Substance and Sexual Activity   Drug Use Never     Social History     Tobacco Use   Smoking Status Never   Smokeless Tobacco Never     Family History   Problem Relation Age of Onset   • Cancer Mother        Meds/Allergies     (Not in a hospital admission)      No Known Allergies    Objective     /58   Pulse 65   Temp 98 3 °F (36 8 °C) (Tympanic)   Resp 18   SpO2 96%       PHYSICAL EXAM    Gen: NAD  CV: RRR  CHEST: Clear  ABD: soft, NT/ND  EXT: no edema  Neuro: AAO      ASSESSMENT/PLAN:  This is a 65y o  year old male here for EGD and colonoscopy       PLAN:   Procedure: EGD/ Colonoscopy

## 2023-02-07 ENCOUNTER — PREP FOR PROCEDURE (OUTPATIENT)
Dept: GASTROENTEROLOGY | Facility: MEDICAL CENTER | Age: 58
End: 2023-02-07

## 2023-02-07 DIAGNOSIS — K20.0 EOSINOPHILIC ESOPHAGITIS: Primary | ICD-10-CM

## 2023-02-23 ENCOUNTER — APPOINTMENT (EMERGENCY)
Dept: RADIOLOGY | Facility: HOSPITAL | Age: 58
End: 2023-02-23

## 2023-02-23 ENCOUNTER — HOSPITAL ENCOUNTER (EMERGENCY)
Facility: HOSPITAL | Age: 58
Discharge: HOME | End: 2023-02-23
Attending: EMERGENCY MEDICINE

## 2023-02-23 VITALS
HEART RATE: 70 BPM | DIASTOLIC BLOOD PRESSURE: 67 MMHG | TEMPERATURE: 99.2 F | RESPIRATION RATE: 20 BRPM | SYSTOLIC BLOOD PRESSURE: 142 MMHG | OXYGEN SATURATION: 97 %

## 2023-02-23 DIAGNOSIS — U07.1 COVID-19: Primary | ICD-10-CM

## 2023-02-23 LAB
FLUAV RNA RESP QL NAA+PROBE: NEGATIVE
FLUBV RNA RESP QL NAA+PROBE: NEGATIVE
RSV RNA RESP QL NAA+PROBE: NEGATIVE
SARS-COV-2 RNA RESP QL NAA+PROBE: POSITIVE

## 2023-02-23 RX ORDER — KETOROLAC TROMETHAMINE 30 MG/ML
15 INJECTION, SOLUTION INTRAMUSCULAR; INTRAVENOUS ONCE
Status: COMPLETED | OUTPATIENT
Start: 2023-02-23 | End: 2023-02-23

## 2023-02-23 RX ADMIN — KETOROLAC TROMETHAMINE 15 MG: 30 INJECTION, SOLUTION INTRAMUSCULAR; INTRAVENOUS at 09:29

## 2023-02-23 NOTE — Clinical Note
Emelyn Cm was seen and treated in our emergency department on 2/23/2023  Diagnosis:     [place]    He may return on this date:     COVID Positive 2/23/2023  Symptoms started 2/19/2023  May return to work 2/24/2023 with consistent mask wearing     If you have any questions or concerns, please don't hesitate to call        Denise Diaz MD    ______________________________           _______________          _______________  Hospital Representative                              Date                                Time

## 2023-02-23 NOTE — ED PROVIDER NOTES
History  Chief Complaint   Patient presents with   • Flu Symptoms     4 days of fever, generalized body aches, cough, headache  History provided by:  Patient  Flu Symptoms  Presenting symptoms: cough, fever, myalgias and sore throat    Presenting symptoms: no diarrhea, no nausea, no rhinorrhea and no shortness of breath    Severity:  Mild  Onset quality:  Gradual  Progression:  Unchanged  Chronicity:  New  Relieved by:  Nothing  Worsened by:  Nothing  Ineffective treatments:  None tried  Associated symptoms: chills        Prior to Admission Medications   Prescriptions Last Dose Informant Patient Reported? Taking?   omeprazole (PriLOSEC) 40 MG capsule   No No   Sig: Take 1 capsule (40 mg total) by mouth daily   tadalafil (CIALIS) 20 MG tablet   No No   Sig: Take 1 tablet (20 mg total) by mouth daily as needed for erectile dysfunction      Facility-Administered Medications: None       Past Medical History:   Diagnosis Date   • Elevated PSA    • Hepatic steatosis    • Hepatomegaly    • High cholesterol        Past Surgical History:   Procedure Laterality Date   • HERNIA REPAIR     • PROSTATE BIOPSY  08/2021    benign       Family History   Problem Relation Age of Onset   • Cancer Mother      I have reviewed and agree with the history as documented  E-Cigarette/Vaping   • E-Cigarette Use Never User      E-Cigarette/Vaping Substances   • Nicotine No    • THC No    • CBD No    • Flavoring No    • Other No    • Unknown No      Social History     Tobacco Use   • Smoking status: Never   • Smokeless tobacco: Never   Vaping Use   • Vaping Use: Never used   Substance Use Topics   • Alcohol use: Yes   • Drug use: Never       Review of Systems   Constitutional: Positive for chills and fever  HENT: Positive for sore throat  Negative for rhinorrhea  Respiratory: Positive for cough  Negative for shortness of breath  Gastrointestinal: Positive for abdominal pain (intermittent periumbilical)   Negative for diarrhea and nausea  Musculoskeletal: Positive for myalgias  Psychiatric/Behavioral: Negative for agitation and behavioral problems  All other systems reviewed and are negative  Physical Exam  Physical Exam  Vitals and nursing note reviewed  Constitutional:       Appearance: Normal appearance  HENT:      Head: Normocephalic and atraumatic  Right Ear: Tympanic membrane normal       Left Ear: Tympanic membrane normal       Nose: Nose normal       Mouth/Throat:      Mouth: Mucous membranes are moist       Pharynx: Oropharynx is clear  No oropharyngeal exudate  Eyes:      Extraocular Movements: Extraocular movements intact  Pupils: Pupils are equal, round, and reactive to light  Cardiovascular:      Rate and Rhythm: Normal rate and regular rhythm  Pulmonary:      Effort: Pulmonary effort is normal       Breath sounds: Normal breath sounds  Abdominal:      General: Abdomen is flat  Musculoskeletal:         General: No swelling or tenderness  Normal range of motion  Cervical back: Normal range of motion and neck supple  Skin:     General: Skin is warm and dry  Neurological:      General: No focal deficit present  Mental Status: He is alert and oriented to person, place, and time     Psychiatric:         Mood and Affect: Mood normal          Behavior: Behavior normal          Vital Signs  ED Triage Vitals   Temperature Pulse Respirations Blood Pressure SpO2   02/23/23 0836 02/23/23 0836 02/23/23 0836 02/23/23 0836 02/23/23 0836   99 2 °F (37 3 °C) 83 20 103/60 96 %      Temp Source Heart Rate Source Patient Position - Orthostatic VS BP Location FiO2 (%)   02/23/23 0836 02/23/23 0836 02/23/23 0932 02/23/23 0836 --   Oral Monitor Lying Left arm       Pain Score       02/23/23 0836       9           Vitals:    02/23/23 0846 02/23/23 0847 02/23/23 0902 02/23/23 0932   BP: 119/70  116/67 142/67   Pulse:  80 76 70   Patient Position - Orthostatic VS:    Lying         Visual Acuity      ED Medications  Medications   ketorolac (TORADOL) injection 15 mg (15 mg Intramuscular Given 2/23/23 8786)       Diagnostic Studies  Results Reviewed     Procedure Component Value Units Date/Time    FLU/RSV/COVID - if FLU/RSV clinically relevant [[id number]]  (Abnormal) Collected: 02/23/23 0930    Lab Status: Final result Specimen: Nares from Nose Updated: 02/23/23 1017     SARS-CoV-2 Positive     INFLUENZA A PCR Negative     INFLUENZA B PCR Negative     RSV PCR Negative    Narrative:      FOR PEDIATRIC PATIENTS - copy/paste COVID Guidelines URL to browser: [email or web address]  [email or web address]    SARS-CoV-2 assay is a Nucleic Acid Amplification assay intended for the  qualitative detection of nucleic acid from SARS-CoV-2 in nasopharyngeal  swabs  Results are for the presumptive identification of SARS-CoV-2 RNA  Positive results are indicative of infection with SARS-CoV-2, the virus  causing COVID-19, but do not rule out bacterial infection or co-infection  with other viruses  Laboratories within the [place] and its  territories are required to report all positive results to the appropriate  public health authorities  Negative results do not preclude SARS-CoV-2  infection and should not be used as the sole basis for treatment or other  patient management decisions  Negative results must be combined with  clinical observations, patient history, and epidemiological information  This test has not been FDA cleared or approved  This test has been authorized by FDA under an Emergency Use Authorization  (EUA)  This test is only authorized for the duration of time the  declaration that circumstances exist justifying the authorization of the  emergency use of an in vitro diagnostic tests for detection of SARS-CoV-2  virus and/or diagnosis of COVID-19 infection under section 564(b)(1) of  the Act, 21 U  S C  864ETR-3(J)(9), unless the authorization is terminated  or revoked sooner  The test has been validated but independent review by FDA  and CLIA is pending  Test performed using [employer] GeneXpert: This RT-PCR assay targets N2,  a region unique to SARS-CoV-2  A conserved region in the E-gene was chosen  for pan-Sarbecovirus detection which includes SARS-CoV-2  According to CMS-2020-01-R, this platform meets the definition of high-throughput technology  XR chest 2 views    (Results Pending)              Procedures  Procedures         ED Course                                             Medical Decision Making  Patient with complaints of body aches and cough  Will check CXR to evaluate for pneumonia  Will check viral swab  DDx: Pneumonia, COVID, Viral syndrome    COVID-19: acute illness or injury with systemic symptoms  Amount and/or Complexity of Data Reviewed  Labs: ordered  Details: COVID positive  Radiology: ordered  Details: My interpretation of the CXR is normal      Risk  Prescription drug management  Disposition  Final diagnoses:   [id number]     Time reflects when diagnosis was documented in both MDM as applicable and the Disposition within this note     Time User Action Codes Description Comment    2/23/2023 10:30 AM Teresa Prasad Add [U07 1] COVID-19       ED Disposition     ED Disposition   Discharge    Condition   Stable    Date/Time   Thu Feb 23, 2023 10:30 AM    Comment   Yajaira Mojica discharge to home/self care  Follow-up Information     Follow up With Specialties Details Why Anette Akbar MD [facility]   [address] [place] [address]  [address] [place] [address]  49  [phone]            Patient's Medications   Discharge Prescriptions    No medications on file       No discharge procedures on file      PDMP Review     None          ED Provider  Electronically Signed by           Ayan Hebert MD  02/23/23 7633

## 2023-03-06 ENCOUNTER — APPOINTMENT (OUTPATIENT)
Dept: LAB | Facility: CLINIC | Age: 58
End: 2023-03-06

## 2023-03-06 DIAGNOSIS — R97.20 ELEVATED PSA: ICD-10-CM

## 2023-03-06 LAB
BUN SERPL-MCNC: 18 MG/DL (ref 5–25)
CREAT SERPL-MCNC: 0.74 MG/DL (ref 0.6–1.3)
GFR SERPL CREATININE-BSD FRML MDRD: 103 ML/MIN/1.73SQ M
PSA SERPL-MCNC: 11.5 NG/ML (ref 0–4)

## 2023-03-13 ENCOUNTER — TELEPHONE (OUTPATIENT)
Dept: ADMINISTRATIVE | Facility: HOSPITAL | Age: 58
End: 2023-03-13

## 2023-03-13 NOTE — TELEPHONE ENCOUNTER
Chart Review  Elevated PSA has ranged 10 5 then 12 5 over past few years  Current 14 5 and 11 5 on repeat  Prior TRUS prostate biopsy August 2021 @ [facility] 12 cores no malignancy, 24g gland size   PSA 12 5 at time of biopsy  Persistently elevated PSA inconsistent with expected psa density for small gland    Image Requested: mpMRI prostate  Image Denial reason: simple date extension from initial order december  Alternate Image Suggested: n/a    Patient [facility]    P2 completed via telephone    Outcome: Proceed with MRI as scheduled tomorrow  Approval #s: [id number]  Valid through 4/7/23

## 2023-03-14 ENCOUNTER — HOSPITAL ENCOUNTER (OUTPATIENT)
Dept: RADIOLOGY | Age: 58
Discharge: HOME | End: 2023-03-14

## 2023-03-14 DIAGNOSIS — R97.20 ELEVATED PSA: ICD-10-CM

## 2023-03-14 RX ADMIN — GADOBUTROL 7 ML: 604.72 INJECTION INTRAVENOUS at 10:18

## 2023-03-21 ENCOUNTER — TELEPHONE (OUTPATIENT)
Dept: UROLOGY | Facility: AMBULATORY SURGERY CENTER | Age: 58
End: 2023-03-21

## 2023-03-21 NOTE — TELEPHONE ENCOUNTER
Radiology Test Results - Radiology Calling with report update    Pt under care of: Tomas COREAS    Significant Findings - Please review

## 2023-03-21 NOTE — TELEPHONE ENCOUNTER
Called patient to review results of multiparametric MRI of prostate which revealed PI-RADS 5, lesion in right transition zone at base and mid gland  Prostate measured 36 cc  Patient answered phone, but states he is typically at work  I was able to briefly discuss the results with patient and plan to proceed with MRI fusion prostate biopsy  Message will be sent to surgery coordinator    He requests that she call after 2 PM

## 2023-03-30 NOTE — TELEPHONE ENCOUNTER
I spoke to the patient and scheduled his procedure for 5/16/2023 at [facility] with Dr Adelso Gaffney   Patient declined sooner     -instructions given verbally and mailed  -patient aware to be NPO, needs a  and use an enema 1 hour prior to leaving the house morning of procedure  -patient aware to avoid any potentially blood thinning medications 7 days prior  -CBC, CMP, Urine C&S  2 weeks prior  - [facility] - on auth tracker 3/30/2023

## 2023-04-07 ENCOUNTER — APPOINTMENT (OUTPATIENT)
Dept: LAB | Facility: CLINIC | Age: 58
End: 2023-04-07

## 2023-04-07 DIAGNOSIS — Z00.8 HEALTH EXAMINATION IN POPULATION SURVEY: ICD-10-CM

## 2023-04-07 LAB
CHOLEST SERPL-MCNC: 189 MG/DL
HDLC SERPL-MCNC: 33 MG/DL
LDLC SERPL CALC-MCNC: 79 MG/DL (ref 0–100)
NONHDLC SERPL-MCNC: 156 MG/DL
TRIGL SERPL-MCNC: 387 MG/DL

## 2023-04-08 LAB
EST. AVERAGE GLUCOSE BLD GHB EST-MCNC: 114 MG/DL
HBA1C MFR BLD: 5.6 %

## 2023-05-02 ENCOUNTER — APPOINTMENT (OUTPATIENT)
Dept: LAB | Facility: CLINIC | Age: 58
End: 2023-05-02

## 2023-05-02 DIAGNOSIS — R97.20 ELEVATED PROSTATE SPECIFIC ANTIGEN (PSA): ICD-10-CM

## 2023-05-02 LAB
ALBUMIN SERPL BCP-MCNC: 4 G/DL (ref 3.5–5)
ALP SERPL-CCNC: 63 U/L (ref 46–116)
ALT SERPL W P-5'-P-CCNC: 48 U/L (ref 12–78)
ANION GAP SERPL CALCULATED.3IONS-SCNC: 6 MMOL/L (ref 4–13)
AST SERPL W P-5'-P-CCNC: 27 U/L (ref 5–45)
BASOPHILS # BLD AUTO: 0.05 THOUSANDS/ΜL (ref 0–0.1)
BASOPHILS NFR BLD AUTO: 1 % (ref 0–1)
BILIRUB SERPL-MCNC: 1.05 MG/DL (ref 0.2–1)
BUN SERPL-MCNC: 16 MG/DL (ref 5–25)
CALCIUM SERPL-MCNC: 9.1 MG/DL (ref 8.3–10.1)
CHLORIDE SERPL-SCNC: 109 MMOL/L (ref 96–108)
CO2 SERPL-SCNC: 22 MMOL/L (ref 21–32)
CREAT SERPL-MCNC: 0.73 MG/DL (ref 0.6–1.3)
EOSINOPHIL # BLD AUTO: 0.31 THOUSAND/ΜL (ref 0–0.61)
EOSINOPHIL NFR BLD AUTO: 5 % (ref 0–6)
ERYTHROCYTE [DISTWIDTH] IN BLOOD BY AUTOMATED COUNT: 12.7 % (ref 11.6–15.1)
GFR SERPL CREATININE-BSD FRML MDRD: 103 ML/MIN/1.73SQ M
GLUCOSE P FAST SERPL-MCNC: 95 MG/DL (ref 65–99)
HCT VFR BLD AUTO: 43 % (ref 36.5–49.3)
HGB BLD-MCNC: 14.7 G/DL (ref 12–17)
IMM GRANULOCYTES # BLD AUTO: 0.04 THOUSAND/UL (ref 0–0.2)
IMM GRANULOCYTES NFR BLD AUTO: 1 % (ref 0–2)
LYMPHOCYTES # BLD AUTO: 2.17 THOUSANDS/ΜL (ref 0.6–4.47)
LYMPHOCYTES NFR BLD AUTO: 32 % (ref 14–44)
MCH RBC QN AUTO: 29.9 PG (ref 26.8–34.3)
MCHC RBC AUTO-ENTMCNC: 34.2 G/DL (ref 31.4–37.4)
MCV RBC AUTO: 88 FL (ref 82–98)
MONOCYTES # BLD AUTO: 0.45 THOUSAND/ΜL (ref 0.17–1.22)
MONOCYTES NFR BLD AUTO: 7 % (ref 4–12)
NEUTROPHILS # BLD AUTO: 3.81 THOUSANDS/ΜL (ref 1.85–7.62)
NEUTS SEG NFR BLD AUTO: 54 % (ref 43–75)
NRBC BLD AUTO-RTO: 0 /100 WBCS
PLATELET # BLD AUTO: 265 THOUSANDS/UL (ref 149–390)
PMV BLD AUTO: 10.2 FL (ref 8.9–12.7)
POTASSIUM SERPL-SCNC: 3.8 MMOL/L (ref 3.5–5.3)
PROT SERPL-MCNC: 7.3 G/DL (ref 6.4–8.4)
RBC # BLD AUTO: 4.91 MILLION/UL (ref 3.88–5.62)
SODIUM SERPL-SCNC: 137 MMOL/L (ref 135–147)
WBC # BLD AUTO: 6.83 THOUSAND/UL (ref 4.31–10.16)

## 2023-05-03 LAB — BACTERIA UR CULT: NORMAL

## 2023-05-16 ENCOUNTER — ANESTHESIA (OUTPATIENT)
Dept: GASTROENTEROLOGY | Facility: HOSPITAL | Age: 58
End: 2023-05-16

## 2023-05-16 ENCOUNTER — HOSPITAL ENCOUNTER (OUTPATIENT)
Facility: HOSPITAL | Age: 58
Setting detail: OUTPATIENT SURGERY
Discharge: HOME | End: 2023-05-16
Attending: STUDENT IN AN ORGANIZED HEALTH CARE EDUCATION/TRAINING PROGRAM | Admitting: STUDENT IN AN ORGANIZED HEALTH CARE EDUCATION/TRAINING PROGRAM

## 2023-05-16 ENCOUNTER — ANESTHESIA EVENT (OUTPATIENT)
Dept: GASTROENTEROLOGY | Facility: HOSPITAL | Age: 58
End: 2023-05-16

## 2023-05-16 ENCOUNTER — TELEPHONE (OUTPATIENT)
Dept: OTHER | Facility: HOSPITAL | Age: 58
End: 2023-05-16

## 2023-05-16 VITALS
TEMPERATURE: 97.3 F | SYSTOLIC BLOOD PRESSURE: 100 MMHG | RESPIRATION RATE: 16 BRPM | HEART RATE: 59 BPM | DIASTOLIC BLOOD PRESSURE: 68 MMHG | OXYGEN SATURATION: 98 %

## 2023-05-16 DIAGNOSIS — R97.20 ELEVATED PROSTATE SPECIFIC ANTIGEN (PSA): ICD-10-CM

## 2023-05-16 RX ORDER — PROPOFOL 10 MG/ML
INJECTION, EMULSION INTRAVENOUS CONTINUOUS PRN
Status: DISCONTINUED | OUTPATIENT
Start: 2023-05-16 | End: 2023-05-16

## 2023-05-16 RX ORDER — SODIUM CHLORIDE 9 MG/ML
INJECTION, SOLUTION INTRAVENOUS CONTINUOUS PRN
Status: DISCONTINUED | OUTPATIENT
Start: 2023-05-16 | End: 2023-05-16

## 2023-05-16 RX ORDER — LIDOCAINE HYDROCHLORIDE 10 MG/ML
INJECTION, SOLUTION EPIDURAL; INFILTRATION; INTRACAUDAL; PERINEURAL AS NEEDED
Status: DISCONTINUED | OUTPATIENT
Start: 2023-05-16 | End: 2023-05-16

## 2023-05-16 RX ORDER — PROPOFOL 10 MG/ML
INJECTION, EMULSION INTRAVENOUS AS NEEDED
Status: DISCONTINUED | OUTPATIENT
Start: 2023-05-16 | End: 2023-05-16

## 2023-05-16 RX ADMIN — PROPOFOL 80 MG: 10 INJECTION, EMULSION INTRAVENOUS at 11:11

## 2023-05-16 RX ADMIN — CEFTRIAXONE SODIUM 1000 MG: 10 INJECTION, POWDER, FOR SOLUTION INTRAVENOUS at 10:32

## 2023-05-16 RX ADMIN — LIDOCAINE HYDROCHLORIDE 50 MG: 10 INJECTION, SOLUTION EPIDURAL; INFILTRATION; INTRACAUDAL; PERINEURAL at 11:11

## 2023-05-16 RX ADMIN — SODIUM CHLORIDE: 9 INJECTION, SOLUTION INTRAVENOUS at 10:50

## 2023-05-16 RX ADMIN — PROPOFOL 100 MCG/KG/MIN: 10 INJECTION, EMULSION INTRAVENOUS at 11:14

## 2023-05-16 NOTE — ANESTHESIA POSTPROCEDURE EVALUATION
Post-Op Assessment Note    CV Status:  Stable  Pain Score: 0    Pain management: adequate     Mental Status:  Sleepy   Hydration Status:  Euvolemic   PONV Controlled:  Controlled   Airway Patency:  Patent      Post Op Vitals Reviewed: Yes      Staff: CRNA         No notable events documented      BP   96/65   Temp   97 3   Pulse  56   Resp   12   SpO2   96

## 2023-05-16 NOTE — ANESTHESIA PREPROCEDURE EVALUATION
Procedure:  TRANSPERINEAL MRI FUSION BIOPSY PROSTATE (Perineum)    Relevant Problems   ANESTHESIA (within normal limits)      CARDIO   (+) Pure hypertriglyceridemia   (-) HTN (hypertension)   (-) MI (myocardial infarction) (HCC)      ENDO   (-) Diabetes mellitus, type 2 (HCC)   (-) Hyperthyroidism   (-) Hypothyroidism      GI/HEPATIC   (+) Gastroesophageal reflux disease without esophagitis   (+) Hepatic steatosis      /RENAL (within normal limits)      HEMATOLOGY (within normal limits)      MUSCULOSKELETAL (within normal limits)      NEURO/PSYCH (within normal limits)      PULMONARY   (-) Asthma   (-) Sleep apnea        Physical Exam    Airway    Mallampati score: III  TM Distance: <3 FB  Neck ROM: full     Dental   No notable dental hx     Cardiovascular      Pulmonary      Other Findings        Anesthesia Plan  ASA Score- 2     Anesthesia Type- IV sedation with anesthesia with ASA Monitors  Additional Monitors:   Airway Plan:           Plan Factors-Exercise tolerance (METS): >4 METS  Chart reviewed  EKG reviewed  Existing labs reviewed  Patient summary reviewed  Patient is not a current smoker  Induction- intravenous  Postoperative Plan- Plan for postoperative opioid use  Informed Consent- Anesthetic plan and risks discussed with patient  I personally reviewed this patient with the CRNA  Discussed and agreed on the Anesthesia Plan with the CRNA  Tee Sinha

## 2023-05-16 NOTE — DISCHARGE INSTR - AVS FIRST PAGE
Mr Kavin Saha underwent prostate biopsy today in the operating room  Everything went as planned and we are just waiting for the pathology to return  Rest for the next couple days and use ice on the perineum if needed  We will make arrangements as discussed to move your appointment to the [facility] office with me on 5/30/2023  Please await a call from our office with the appointment time  At this appointment we will discuss your pathology  If you have any concerns in the meantime please do not hesitate to call our office at [phone]  The address is [address] [place] [address], [id number]      Dr Babs Landeros [facility]

## 2023-05-16 NOTE — H&P
UROLOGY HISTORY AND PHYSICAL     Name: Juanjose Luo  : 1965  MRN: [id number]  Date of Service: 23      CHIEF COMPLAINT   Elevated PSA, Abnormal Prostate MRI    History of Present Illness:     Juanjose Luo is a 65 y o  male presenting for transperineal MRI Fusion prostate biopsy  The patient established with us 2022 with PSA of 14 5  He has a history of erectile dysfunction and elevated PSA  He takes tadalafil as needed on demand  In 2021 he underwent prostate biopsy at [facility] for PSA up to 10 5  Prostate measured 24 g  Path returned benign  After establishing both multiparametric MRI of the prostate was obtained demonstrating a PI-RADS 5 lesion in the right transition zone  Prostate gland measured 36 g  The patient does not take any blood thinners      PSA trend   21 PSA 10 50  21 PSA 12 50   21 negative biopsy       PAST MEDICAL HISTORY:     Past Medical History:   Diagnosis Date   • Elevated PSA    • Hepatic steatosis    • Hepatomegaly    • High cholesterol        PAST SURGICAL HISTORY:     Past Surgical History:   Procedure Laterality Date   • HERNIA REPAIR     • PROSTATE BIOPSY  2021    benign       CURRENT MEDICATIONS:     Current Facility-Administered Medications   Medication Dose Route Frequency Provider Last Rate Last Admin   • ceftriaxone (ROCEPHIN) 1 g/50 mL in dextrose IVPB  1,000 mg Intravenous On Call To OR Vandana Sheldon  mL/hr at 23 1032 1,000 mg at 23 1032     Facility-Administered Medications Ordered in Other Encounters   Medication Dose Route Frequency Provider Last Rate Last Admin   • sodium chloride 0 9 % infusion   Intravenous Continuous PRN Skylar Mireles CRNA   New Bag at 23 1050       ALLERGIES:   No Known Allergies    SOCIAL HISTORY:     Social History     Socioeconomic History   • Marital status: /Civil Union     Spouse name: None   • Number of children: None   • Years of education: None   • Highest education level: None   Occupational History   • None   Tobacco Use   • Smoking status: Never   • Smokeless tobacco: Never   Vaping Use   • Vaping Use: Never used   Substance and Sexual Activity   • Alcohol use: Yes   • Drug use: Never   • Sexual activity: Yes     Partners: Female   Other Topics Concern   • None   Social History Narrative   • None     Social Determinants of Health     Financial Resource Strain: Not on file   Food Insecurity: Not on file   Transportation Needs: Not on file   Physical Activity: Not on file   Stress: Not on file   Social Connections: Not on file   Intimate Partner Violence: Not on file   Housing Stability: Not on file       FAMILY HISTORY:     Family History   Problem Relation Age of Onset   • Cancer Mother        REVIEW OF SYSTEMS:     Pertinent  + and - in HPI      PHYSICAL EXAM:     /59   Pulse 56   Temp 97 8 °F (36 6 °C) (Tympanic)   Resp 18   SpO2 96%     General:  Healthy appearing male in no acute distress  Head:  Normocephalic, atraumatic  Eyes: no scleral icterus  ENMT: Nares patent, moist mucous membranes  Cardiovascular:  Regular rate  Respiratory:  Patient has unlabored respirations  Abdomen:  Abdomen nondistended  Extremities: Normal ROM, no deformities  NANCY: deferred      LABS:     CBC:   Lab Results   Component Value Date    WBC 6 83 05/02/2023    HGB 14 7 05/02/2023    HCT 43 0 05/02/2023    MCV 88 05/02/2023     05/02/2023       BMP:   Lab Results   Component Value Date    CALCIUM 9 1 05/02/2023    K 3 8 05/02/2023    CO2 22 05/02/2023     (H) 05/02/2023    BUN 16 05/02/2023    CREATININE 0 73 05/02/2023         ASSESSMENT:     65 y o  male with elevated PSA presenting for transperineal MRI fusion prostate biopsy      PLAN:     [facility] for above  [facility]    Ynes Saldana MD  [address]  for Urology

## 2023-05-16 NOTE — OP NOTE
OPERATIVE REPORT  PATIENT NAME: Giovanny Ramos    :  1965  MRN: [id number]  Pt Location: [facility]    SURGERY DATE: 2023    Surgeon(s) and Role: * Tigre Tellez MD - Primary    Preop Diagnosis:  Elevated prostate specific antigen (PSA) [R97 20]  Abnormal Prostate MRI    Post-Op Diagnosis Codes:     * Elevated prostate specific antigen (PSA) [R97 20]   Abnormal Prostate MRI    Procedure(s):  TRANSPERINEAL MRI FUSION BIOPSY PROSTATE    Specimen(s):  ID Type Source Tests Collected by Time Destination   1 : KAREN Right anterior mid x6  Tissue Prostate TISSUE EXAM Tigre Tellez MD 2023 1105    2 : Right anterior lateral x2  Tissue Prostate TISSUE EXAM Tigre Tellez MD 2023 1105    3 : Right anterior medial x2 Tissue Prostate TISSUE EXAM Tigre Tellez MD 2023 1105    4 : Left anterior lateral x2 Tissue Prostate TISSUE EXAM Tigre Tellez MD 2023 1105    5 : Left anterior medial x2 Tissue Prostate TISSUE EXAM Tigre Tellez MD 2023 1105    6 : Left posterior lateral x2 Tissue Prostate TISSUE EXAM Tigre Tellez MD 2023 1105    7 : Left posterior medial x2  Tissue Prostate TISSUE EXAM Tigre Tellez MD 2023 1105    8 : Left base x2 Tissue Prostate TISSUE EXAM Tigre Tellez MD 2023 1105    9 : Right posterior lateral x2  Tissue Prostate TISSUE EXAM Tigre Tellez MD 2023 1105    10 : Right posterior medial x2  Tissue Prostate TISSUE EXAM Tigre Tellez MD 2023 1106    11 : Right base x2   Tissue Prostate TISSUE EXAM Tiger Tellez MD 2023 1106        Estimated Blood Loss:   Minimal    Drains:  * No LDAs found *    Anesthesia Type:   IV Sedation with Anesthesia    Operative Indications:  Elevated prostate specific antigen (PSA) [R97 20]  Abnormal Prostate MRI    Operative Findings:  No definitive palpable disease on digital rectal exam   A total of 26 biopsies were obtained      Complications:   None    Procedure and Technique:  Procedure was transperineal saturation prostate biopsy with MRI fusion sampling of lesion or lesions utilizing the Precision Point perineal access device utilized to map the standard geographic sites of the prostate  Juanjose Luo is a 65 y o  male with history of elevated PSA and abnormal MRI  Patient has undergone prior biopsy of the prostate with negative results and continued rising PSA  Patient did undergo pre biopsy multiparametric MRI of the prostate  There was 1 lesion(s) with highest PIRADs rating 5 so the patient was scheduled for transperineal saturation biopsy with addition of MRI fusion of the abnormal lesions  The patient was scheduled for his procedure in an outpatient surgical context with the assistance of the anesthesia team for sedation  He was met in the preoperative holding area by the performing urologist, the anesthesia team and the intraoperative nursing staff  The procedure along with its risks and benefits were discussed and reviewed  Patient signed an informed consent  Patient was then transferred to procedure suite  Pre-procedural time out was performed with all parties present  He was treated with periprocedural antibiotics, rocephin 1 gram  He was placed in dorsal lithotomy with care to pad all pressure points  His perineum and genitalia were shaved/prepped with chloroprep  The scrotum was elevated a secured aware from the perineum above the rectum  With the assistance of the [address] [place] [address] technician, a survey of the prostate anatomy was performed  The technician then linked the previously obtained MRI images to the real-time ultrasound  The PIRADs lesions seen on MRI were identified on the ultrasound  In the midportion of the left and right prostate, a lowell was denoted in the perineal skin  Skin wheal was elevated with a 50/50 mixture of 2% lidocaine plain and 0 5% marcaine plain on either side      The PrecisionPoint access needle was then introduced into the left perineal subcutaneous tissue  We visualized the tip of the needle  Spinal needle was introduced through the subcutaneous fat and into the pelvic floor muscle where a perineal pudendal block was performed with additional local anesthetic  This was repeated on the patient's right side  Utilizing the Precision Point access needle and stepper, ultrasound guidance was utilized to place the spring-loaded biopsy needle into MRI lesions  The first lesion that was sampled was PIRADs 5 in the right anterior mid gland  6 samples were taken  We confirmed good position of the needle strikes utilizing the fusion software  We now moved to take our standard transperineal samples, which allowed us to carefully map and saturate each of the 10 zones that have subdivided the prostate into zonal anatomy  The Precision Point access needle and stepper was positioned into the RIGHT perineal space and ultrasound guidance was utilized to place the spring-loaded biopsy needle into the following areas    RIGHT anterior medial: 2 biopsies taken  RIGHT posterior medial: 2 biopsies taken  RIGHT posterior lateral: 2 biopsies taken  RIGHT base: 2 biopsies taken  RIGHT anterior lateral: 2 biopsies taken  The Precision Point access needle and stepper was re-positioned into the LEFT perineal space and ultrasound guidance was utilized to place the spring-loaded biopsy needle into the following areas  LEFT anterior medial: 2 biopsies taken  LEFT posterior medial: 2 biopsies taken  LEFT posterior lateral: 2 biopsies taken  LEFT base: 2 biopsies taken  LEFT anterior lateral: 2 biopsies taken    A total of 26 biopsy cores were obtained  Transrectal ultrasound was removed  The scrotum was released  Some gentle pressure was placed on the perineum for hemostasis  At the completion of the procedure, the patient was taken out of dorsal lithotomy, recovered from their anesthesia, and transferred to PACU in good condition  Overall, the patient tolerated the procedure and there were no complications  Plan: The patient will be monitored in recovery, allowed to void prior to discharge and return for biopsy pathology review in the office with their primary urologist        I was present for the entire procedure      Patient Disposition:  PACU         SIGNATURE: Natalie Mahmood MD  DATE: May 16, 2023  TIME: 11:51 AM

## 2023-05-30 ENCOUNTER — TELEPHONE (OUTPATIENT)
Dept: OTHER | Facility: OTHER | Age: 58
End: 2023-05-30

## 2023-05-30 ENCOUNTER — OFFICE VISIT (OUTPATIENT)
Dept: UROLOGY | Facility: CLINIC | Age: 58
End: 2023-05-30

## 2023-05-30 ENCOUNTER — TELEPHONE (OUTPATIENT)
Dept: HEMATOLOGY ONCOLOGY | Facility: CLINIC | Age: 58
End: 2023-05-30

## 2023-05-30 VITALS
WEIGHT: 150 LBS | BODY MASS INDEX: 27.6 KG/M2 | SYSTOLIC BLOOD PRESSURE: 102 MMHG | HEIGHT: 62 IN | DIASTOLIC BLOOD PRESSURE: 68 MMHG | OXYGEN SATURATION: 98 % | HEART RATE: 60 BPM

## 2023-05-30 DIAGNOSIS — Z98.890 HISTORY OF LEFT INGUINAL HERNIA REPAIR: ICD-10-CM

## 2023-05-30 DIAGNOSIS — Z87.19 HISTORY OF LEFT INGUINAL HERNIA REPAIR: ICD-10-CM

## 2023-05-30 DIAGNOSIS — C61 PROSTATE CANCER (HCC): Primary | ICD-10-CM

## 2023-05-30 NOTE — TELEPHONE ENCOUNTER
Transferred to his doctor    Dept directions for [facility] [place]:  [facility] [address] [id number] [place], [place], [address] [place] [address]
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
## 2023-05-30 NOTE — TELEPHONE ENCOUNTER
Pt stated had OV today and stated wanted to know of can get test for his bones before his procedure   Please call back to discuss

## 2023-05-31 PROBLEM — C61 PROSTATE CANCER (HCC): Status: ACTIVE | Noted: 2023-05-31

## 2023-05-31 PROBLEM — Z87.19 HISTORY OF LEFT INGUINAL HERNIA REPAIR: Status: ACTIVE | Noted: 2023-05-31

## 2023-05-31 PROBLEM — R97.20 ELEVATED PSA: Status: RESOLVED | Noted: 2021-07-19 | Resolved: 2023-05-31

## 2023-05-31 PROBLEM — Z98.890 HISTORY OF LEFT INGUINAL HERNIA REPAIR: Status: ACTIVE | Noted: 2023-05-31

## 2023-05-31 NOTE — ASSESSMENT & PLAN NOTE
The patient has a new diagnosis of intermediate risk favorable prostate cancer  We discussed the risk stratification of prostate cancer based on PSA, biopsy results and exam  We then discussed the roles for active surveillance versus surgery versus radiation  We discussed how active surveillance is performed including repeat PSA every 6 months, the use of MRI at baseline and then annually and repeat biopsy based on PSA and MRI results  We discussed surgery in detail including robot assisted laparoscopic surgery with the role for nerve-sparing and pelvic lymph node dissection  We also discussed the risks of surgery to include rectal injury erectile dysfunction prolonged incontinence injury to the bowel bladder or nerves and need for further surgeries  The patient was given opportunity to ask questions about each treatment modality  He was offered a referral to Radiation Oncology  After our discussion the patient and his wife are not comfortable with active surveillance  They want surgery rather than radiation  He did not want to meet with radiation oncology  He would like surgery soon as possible  I explained we usually wait 6 weeks from the biopsy to let inflammation go down before considering surgery  Given his cancer is all found on the right side it is reasonable to attempt a left nerve sparing  His prior abdominal surgical history may make him at high risk for adhesions and bowel injury complications during surgery  We had a long conversation regarding the expected course of a robot assisted prostatectomy including how the surgery is done and the typical postoperative course including keeping catheter for 7-10 days and, less likely, an abdominal drain    We discussed risks of surgery including bleeding requiring transfusion, bowel injury (as mentioned above higher risk because of prior surgical hx), bladder injury, ureteral injury, urine leak, lymphocele formation all of which could require additional surgical intervention  We spoke in more detail about the stress incontinence and erectile dysfunction and med experience after surgery and the expected he healing of with regards to each including a 5% risk of persistent incontinence

## 2023-06-01 NOTE — TELEPHONE ENCOUNTER
Patient seen by [facility] on 5/30 for prostate cancer  Plan will be for a prostatectomy  Ordered a pelvic CT scan which is not yet scheduled   Patient also requesting a bone scan

## 2023-06-01 NOTE — TELEPHONE ENCOUNTER
Advised patient of Xena's message and why we do not order bones scans  Verbalized understanding  Reminded him of the CT scan ordered and that the surgery scheduler should be reaching out to him by the end of next week   No further questions or concerns

## 2023-06-02 ENCOUNTER — PREP FOR PROCEDURE (OUTPATIENT)
Dept: UROLOGY | Facility: AMBULATORY SURGERY CENTER | Age: 58
End: 2023-06-02

## 2023-06-02 DIAGNOSIS — Z79.01 LONG TERM (CURRENT) USE OF ANTICOAGULANTS: ICD-10-CM

## 2023-06-02 DIAGNOSIS — Z01.810 PRE-OPERATIVE CARDIOVASCULAR EXAMINATION: ICD-10-CM

## 2023-06-02 DIAGNOSIS — C61 PROSTATE CANCER (HCC): Primary | ICD-10-CM

## 2023-06-02 DIAGNOSIS — Z01.818 PREOPERATIVE EXAMINATION, UNSPECIFIED: ICD-10-CM

## 2023-06-02 DIAGNOSIS — Z01.812 PRE-OPERATIVE LABORATORY EXAMINATION: ICD-10-CM

## 2023-06-02 DIAGNOSIS — R39.89 SUSPECTED UTI: ICD-10-CM

## 2023-06-06 NOTE — TELEPHONE ENCOUNTER
-Spoke to the patient, he scheduled his Surgical Procedure on 07/10/23 at [facility] w/Dr Konstantin Gardner     -He is aware the hospital will call the day prior with the arrival time, that he/she will need a  to & from the hospital, the hospital will call the day prior with the arrival time and to go over information  Hold blood thinning Medications/Aspirin/Ibuprofen/Vitamins/Fish Oil/Phentermine for 7 days prior to surgery, unless otherwise told differently  Nothing to eat or drink after midnight  If you did not receive a Surgical bag from the Urology Office you will need to stop by one of the Urology offices for one, in it will be your special Soap Hibiclens and Spirometer  Bloodwork/Urine Culture/EKG will need to be done at any [facility], [address] [place] [address], [address] [place] [address] or [facility] on 06/26/23        -Bowl Prep:  Purchase A Few Days Prior To Procedure  1 bottle of Miralax (238 g); no prescription needed  4 Dulcolax Laxative Tablets; no prescription needed  Fleets Enema; no prescription needed  64 oz bottle of Gatorade or Crystal Light or another clear liquid of your choice (NOT RED)    -He will go for urine culture/labwork/EKG 2 weeks prior      -Emailed surgical packet per patient request [email or web address]    -Surgical Consent scanned into chart    -Surgical bag with Hibiclens/Spirometer

## 2023-06-13 ENCOUNTER — APPOINTMENT (OUTPATIENT)
Dept: LAB | Facility: CLINIC | Age: 58
End: 2023-06-13
Payer: COMMERCIAL

## 2023-06-13 ENCOUNTER — LAB REQUISITION (OUTPATIENT)
Dept: LAB | Facility: HOSPITAL | Age: 58
End: 2023-06-13
Payer: COMMERCIAL

## 2023-06-13 DIAGNOSIS — Z79.01 LONG TERM (CURRENT) USE OF ANTICOAGULANTS: ICD-10-CM

## 2023-06-13 DIAGNOSIS — Z01.810 PRE-OPERATIVE CARDIOVASCULAR EXAMINATION: ICD-10-CM

## 2023-06-13 DIAGNOSIS — Z01.818 PREOPERATIVE EXAMINATION, UNSPECIFIED: ICD-10-CM

## 2023-06-13 DIAGNOSIS — C61 MALIGNANT NEOPLASM OF PROSTATE (HCC): ICD-10-CM

## 2023-06-13 DIAGNOSIS — R39.89 SUSPECTED UTI: ICD-10-CM

## 2023-06-13 DIAGNOSIS — C61 PROSTATE CANCER (HCC): ICD-10-CM

## 2023-06-13 DIAGNOSIS — Z01.812 PRE-OPERATIVE LABORATORY EXAMINATION: ICD-10-CM

## 2023-06-13 LAB
ABO GROUP BLD: NORMAL
ALBUMIN SERPL BCP-MCNC: 3.9 G/DL (ref 3.5–5)
ALP SERPL-CCNC: 59 U/L (ref 46–116)
ALT SERPL W P-5'-P-CCNC: 48 U/L (ref 12–78)
ANION GAP SERPL CALCULATED.3IONS-SCNC: 3 MMOL/L (ref 4–13)
APTT PPP: 26 SECONDS (ref 23–37)
AST SERPL W P-5'-P-CCNC: 21 U/L (ref 5–45)
BASOPHILS # BLD AUTO: 0.04 THOUSANDS/ÂΜL (ref 0–0.1)
BASOPHILS NFR BLD AUTO: 1 % (ref 0–1)
BILIRUB SERPL-MCNC: 0.75 MG/DL (ref 0.2–1)
BLD GP AB SCN SERPL QL: NEGATIVE
BUN SERPL-MCNC: 17 MG/DL (ref 5–25)
CALCIUM SERPL-MCNC: 8.9 MG/DL (ref 8.3–10.1)
CHLORIDE SERPL-SCNC: 112 MMOL/L (ref 96–108)
CO2 SERPL-SCNC: 25 MMOL/L (ref 21–32)
CREAT SERPL-MCNC: 0.86 MG/DL (ref 0.6–1.3)
EOSINOPHIL # BLD AUTO: 0.34 THOUSAND/ÂΜL (ref 0–0.61)
EOSINOPHIL NFR BLD AUTO: 5 % (ref 0–6)
ERYTHROCYTE [DISTWIDTH] IN BLOOD BY AUTOMATED COUNT: 12.8 % (ref 11.6–15.1)
GFR SERPL CREATININE-BSD FRML MDRD: 96 ML/MIN/1.73SQ M
GLUCOSE P FAST SERPL-MCNC: 103 MG/DL (ref 65–99)
HCT VFR BLD AUTO: 45.2 % (ref 36.5–49.3)
HGB BLD-MCNC: 15.4 G/DL (ref 12–17)
IMM GRANULOCYTES # BLD AUTO: 0.03 THOUSAND/UL (ref 0–0.2)
IMM GRANULOCYTES NFR BLD AUTO: 0 % (ref 0–2)
INR PPP: 0.91 (ref 0.84–1.19)
LYMPHOCYTES # BLD AUTO: 2.13 THOUSANDS/ÂΜL (ref 0.6–4.47)
LYMPHOCYTES NFR BLD AUTO: 30 % (ref 14–44)
MCH RBC QN AUTO: 30.1 PG (ref 26.8–34.3)
MCHC RBC AUTO-ENTMCNC: 34.1 G/DL (ref 31.4–37.4)
MCV RBC AUTO: 89 FL (ref 82–98)
MONOCYTES # BLD AUTO: 0.43 THOUSAND/ÂΜL (ref 0.17–1.22)
MONOCYTES NFR BLD AUTO: 6 % (ref 4–12)
NEUTROPHILS # BLD AUTO: 4.2 THOUSANDS/ÂΜL (ref 1.85–7.62)
NEUTS SEG NFR BLD AUTO: 58 % (ref 43–75)
NRBC BLD AUTO-RTO: 0 /100 WBCS
PLATELET # BLD AUTO: 260 THOUSANDS/UL (ref 149–390)
PMV BLD AUTO: 10.6 FL (ref 8.9–12.7)
POTASSIUM SERPL-SCNC: 4.1 MMOL/L (ref 3.5–5.3)
PROT SERPL-MCNC: 7.2 G/DL (ref 6.4–8.4)
PROTHROMBIN TIME: 12.5 SECONDS (ref 11.6–14.5)
RBC # BLD AUTO: 5.11 MILLION/UL (ref 3.88–5.62)
RH BLD: POSITIVE
SODIUM SERPL-SCNC: 140 MMOL/L (ref 135–147)
SPECIMEN EXPIRATION DATE: NORMAL
WBC # BLD AUTO: 7.17 THOUSAND/UL (ref 4.31–10.16)

## 2023-06-13 PROCEDURE — 85730 THROMBOPLASTIN TIME PARTIAL: CPT

## 2023-06-13 PROCEDURE — 85025 COMPLETE CBC W/AUTO DIFF WBC: CPT

## 2023-06-13 PROCEDURE — 80053 COMPREHEN METABOLIC PANEL: CPT

## 2023-06-13 PROCEDURE — 86850 RBC ANTIBODY SCREEN: CPT | Performed by: UROLOGY

## 2023-06-13 PROCEDURE — 86901 BLOOD TYPING SEROLOGIC RH(D): CPT | Performed by: UROLOGY

## 2023-06-13 PROCEDURE — 87086 URINE CULTURE/COLONY COUNT: CPT

## 2023-06-13 PROCEDURE — 85610 PROTHROMBIN TIME: CPT

## 2023-06-13 PROCEDURE — 36415 COLL VENOUS BLD VENIPUNCTURE: CPT

## 2023-06-13 PROCEDURE — 86900 BLOOD TYPING SEROLOGIC ABO: CPT | Performed by: UROLOGY

## 2023-06-14 ENCOUNTER — HOSPITAL ENCOUNTER (EMERGENCY)
Facility: HOSPITAL | Age: 58
Discharge: HOME | End: 2023-06-14
Attending: EMERGENCY MEDICINE
Payer: COMMERCIAL

## 2023-06-14 ENCOUNTER — APPOINTMENT (EMERGENCY)
Dept: RADIOLOGY | Facility: HOSPITAL | Age: 58
End: 2023-06-14
Payer: COMMERCIAL

## 2023-06-14 VITALS
TEMPERATURE: 98.1 F | HEART RATE: 66 BPM | DIASTOLIC BLOOD PRESSURE: 76 MMHG | RESPIRATION RATE: 20 BRPM | OXYGEN SATURATION: 95 % | SYSTOLIC BLOOD PRESSURE: 112 MMHG

## 2023-06-14 DIAGNOSIS — R93.7 ABNORMAL CT SCAN, CERVICAL SPINE: ICD-10-CM
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
## 2023-06-14 DIAGNOSIS — S16.1XXA STRAIN OF NECK MUSCLE, INITIAL ENCOUNTER: Primary | ICD-10-CM

## 2023-06-14 LAB
ATRIAL RATE: 80 BPM
BACTERIA UR CULT: NORMAL
P AXIS: 72 DEGREES
PR INTERVAL: 128 MS
QRS AXIS: 76 DEGREES
QRSD INTERVAL: 94 MS
QT INTERVAL: 364 MS
QTC INTERVAL: 419 MS
T WAVE AXIS: 71 DEGREES
VENTRICULAR RATE: 80 BPM

## 2023-06-14 PROCEDURE — 93005 ELECTROCARDIOGRAM TRACING: CPT

## 2023-06-14 PROCEDURE — 71250 CT THORAX DX C-: CPT

## 2023-06-14 PROCEDURE — 93010 ELECTROCARDIOGRAM REPORT: CPT | Performed by: INTERNAL MEDICINE

## 2023-06-14 PROCEDURE — G1004 CDSM NDSC: HCPCS

## 2023-06-14 PROCEDURE — 99284 EMERGENCY DEPT VISIT MOD MDM: CPT

## 2023-06-14 PROCEDURE — 96372 THER/PROPH/DIAG INJ SC/IM: CPT

## 2023-06-14 PROCEDURE — 72125 CT NECK SPINE W/O DYE: CPT

## 2023-06-14 RX ORDER — METHOCARBAMOL 500 MG/1
500 TABLET, FILM COATED ORAL 2 TIMES DAILY PRN
Qty: 15 TABLET | Refills: 0 | Status: SHIPPED | OUTPATIENT
Start: 2023-06-14 | End: 2023-06-29

## 2023-06-14 RX ORDER — KETOROLAC TROMETHAMINE 30 MG/ML
15 INJECTION, SOLUTION INTRAMUSCULAR; INTRAVENOUS ONCE
Status: COMPLETED | OUTPATIENT
Start: 2023-06-14 | End: 2023-06-14

## 2023-06-14 RX ADMIN — KETOROLAC TROMETHAMINE 15 MG: 30 INJECTION, SOLUTION INTRAMUSCULAR; INTRAVENOUS at 10:24

## 2023-06-14 NOTE — DISCHARGE INSTRUCTIONS
Please follow-up with neurosurgery in approximately 10 days  In the meantime, you may wear the soft cervical collar for the next 3-4 days  You can take the collar off to eat and drive, but you should wear it to sleep  You may take tylenol and ibuprofen as needed for pain  Return to the emergency department for any new or worsening symptoms including worsening neck pain, numbness or weakness in your extremities, or other concerning symptoms
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
## 2023-06-14 NOTE — Clinical Note
Reggie Rojas was seen and treated in our emergency department on 6/14/2023  Diagnosis:     Boaz Casarez  is off the rest of the shift today  He may return on this date: If you have any questions or concerns, please don't hesitate to call        Nabeel Hernandez DO    ______________________________           _______________          _______________  Hospital Representative                              Date                                Time

## 2023-06-15 ENCOUNTER — TELEPHONE (OUTPATIENT)
Dept: NEUROSURGERY | Facility: CLINIC | Age: 58
End: 2023-06-15

## 2023-06-15 NOTE — TELEPHONE ENCOUNTER
06/16/202-CALLED PT (WITH HELP FROM [employer]), CONFIRMED 06/29/2023 APT IN [place], NO IMAGING    [facility] TBPGU#[id number]   NAME: [place] [employer]  PHONE#[phone]           PER PEGGY: SCHEDULED W/[facility] CLINICAL F/U  HOD NOTE PENDING     08/15/2023-Peggy Waldrop-VIRAL [place]   Per HOD patient needs 2-week hospital follow-up for clinical follow-up with [facility] before July 4

## 2023-06-21 ENCOUNTER — HOSPITAL ENCOUNTER (OUTPATIENT)
Dept: RADIOLOGY | Facility: HOSPITAL | Age: 58
Discharge: HOME | End: 2023-06-21
Attending: UROLOGY
Payer: COMMERCIAL

## 2023-06-21 DIAGNOSIS — Z98.890 HISTORY OF LEFT INGUINAL HERNIA REPAIR: ICD-10-CM

## 2023-06-21 DIAGNOSIS — Z87.19 HISTORY OF LEFT INGUINAL HERNIA REPAIR: ICD-10-CM

## 2023-06-21 PROCEDURE — 72192 CT PELVIS W/O DYE: CPT

## 2023-06-21 PROCEDURE — G1004 CDSM NDSC: HCPCS

## 2023-06-21 RX ORDER — ACETAMINOPHEN 500 MG
1000 TABLET ORAL EVERY 6 HOURS PRN
COMMUNITY

## 2023-06-21 NOTE — PRE-PROCEDURE INSTRUCTIONS
Pre-Surgery Instructions:   Medication Instructions   • acetaminophen (TYLENOL) 500 mg tablet Uses PRN- OK to take day of surgery   • methocarbamol (ROBAXIN) 500 mg tablet Uses PRN- DO NOT take day of surgery   • omeprazole (PriLOSEC) 40 MG capsule Uses PRN- OK to take day of surgery   See above    . Lul French Medication instructions for day surgery reviewed. Please use only a sip of water to take your instructed medications. Avoid all over the counter vitamins, supplements and NSAIDS for one week prior to surgery per anesthesia guidelines. Tylenol is ok to take as needed. You will receive a call one business day prior to surgery with an arrival time and hospital directions. If your surgery is scheduled on a Monday, the hospital will be calling you on the Friday prior to your surgery. If you have not heard from anyone by 8pm, please call the hospital supervisor through the hospital  at [phone]. Issa Mariee [phone]). Do not eat or drink anything after midnight the night before your surgery, including candy, mints, lifesavers, or chewing gum. Do not drink alcohol 24hrs before your surgery. Try not to smoke at least 24hrs before your surgery. Follow the pre surgery showering instructions as listed in the [facility] Surgical Experience Booklet” or otherwise provided by your surgeon's office. Do not shave the surgical area 24 hours before surgery. Do not apply any lotions, creams, including makeup, cologne, deodorant, or perfumes after showering on the day of your surgery. No contact lenses, eye make-up, or artificial eyelashes. Remove nail polish, including gel polish, and any artificial, gel, or acrylic nails if possible. Remove all jewelry including rings and body piercing jewelry. Wear causal clothing that is easy to take on and off. Consider your type of surgery. Keep any valuables, jewelry, piercings at home.  Please bring any specially ordered equipment (sling, braces) if indicated. Arrange for a responsible person to drive you to and from the hospital on the day of your surgery. Visitor Guidelines discussed. Call the surgeon's office with any new illnesses, exposures, or additional questions prior to surgery. Please reference your [facility] Surgical Experience Booklet” for additional information to prepare for your upcoming surgery.

## 2023-06-29 ENCOUNTER — OFFICE VISIT (OUTPATIENT)
Dept: NEUROSURGERY | Facility: CLINIC | Age: 58
End: 2023-06-29
Payer: COMMERCIAL

## 2023-06-29 VITALS
RESPIRATION RATE: 16 BRPM | TEMPERATURE: 99.2 F | SYSTOLIC BLOOD PRESSURE: 118 MMHG | HEIGHT: 62 IN | BODY MASS INDEX: 27.97 KG/M2 | HEART RATE: 73 BPM | WEIGHT: 152 LBS | DIASTOLIC BLOOD PRESSURE: 66 MMHG | OXYGEN SATURATION: 96 %

## 2023-06-29 DIAGNOSIS — R93.7 ABNORMAL CT SCAN, CERVICAL SPINE: Primary | ICD-10-CM

## 2023-06-29 PROBLEM — S13.4XXA NECK PAIN WITH NECK STIFFNESS AFTER WHIPLASH INJURY TO NECK: Status: ACTIVE | Noted: 2023-06-29

## 2023-06-29 PROCEDURE — 99213 OFFICE O/P EST LOW 20 MIN: CPT | Performed by: NURSE PRACTITIONER

## 2023-06-29 RX ORDER — METHOCARBAMOL 500 MG/1
500 TABLET, FILM COATED ORAL 4 TIMES DAILY
Qty: 56 TABLET | Refills: 0 | Status: SHIPPED | OUTPATIENT
Start: 2023-06-29 | End: 2023-07-13
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
## 2023-07-05 ENCOUNTER — CONSULT (OUTPATIENT)
Dept: FAMILY MEDICINE CLINIC | Facility: CLINIC | Age: 58
End: 2023-07-05
Payer: COMMERCIAL

## 2023-07-05 VITALS
HEIGHT: 62 IN | SYSTOLIC BLOOD PRESSURE: 126 MMHG | WEIGHT: 151.4 LBS | OXYGEN SATURATION: 96 % | RESPIRATION RATE: 17 BRPM | BODY MASS INDEX: 27.86 KG/M2 | TEMPERATURE: 99.8 F | DIASTOLIC BLOOD PRESSURE: 65 MMHG | HEART RATE: 71 BPM

## 2023-07-05 DIAGNOSIS — Z98.890 HISTORY OF LEFT INGUINAL HERNIA REPAIR: ICD-10-CM

## 2023-07-05 DIAGNOSIS — Z01.818 PREOP EXAMINATION: Primary | ICD-10-CM

## 2023-07-05 DIAGNOSIS — K21.00 GASTROESOPHAGEAL REFLUX DISEASE WITH ESOPHAGITIS WITHOUT HEMORRHAGE: ICD-10-CM

## 2023-07-05 DIAGNOSIS — C61 PROSTATE CANCER (HCC): ICD-10-CM

## 2023-07-05 DIAGNOSIS — S13.4XXA NECK PAIN WITH NECK STIFFNESS AFTER WHIPLASH INJURY TO NECK: ICD-10-CM

## 2023-07-05 DIAGNOSIS — Z87.19 HISTORY OF LEFT INGUINAL HERNIA REPAIR: ICD-10-CM

## 2023-07-05 PROBLEM — G89.29 CHRONIC PAIN OF RIGHT KNEE: Status: ACTIVE | Noted: 2023-07-05

## 2023-07-05 PROBLEM — M25.561 CHRONIC PAIN OF RIGHT KNEE: Status: ACTIVE | Noted: 2023-07-05

## 2023-07-05 PROBLEM — E55.9 VITAMIN D DEFICIENCY: Status: ACTIVE | Noted: 2021-09-21

## 2023-07-05 PROBLEM — R74.01 ELEVATED TRANSAMINASE LEVEL: Status: ACTIVE | Noted: 2021-09-21

## 2023-07-05 PROBLEM — E66.3 OVERWEIGHT (BMI 25.0-29.9): Status: ACTIVE | Noted: 2021-09-21

## 2023-07-05 PROBLEM — M50.20 PROTRUDED CERVICAL DISC: Status: ACTIVE | Noted: 2023-07-05

## 2023-07-05 PROCEDURE — 99214 OFFICE O/P EST MOD 30 MIN: CPT | Performed by: PHYSICIAN ASSISTANT

## 2023-07-05 PROCEDURE — 93000 ELECTROCARDIOGRAM COMPLETE: CPT | Performed by: PHYSICIAN ASSISTANT

## 2023-07-05 RX ORDER — OMEPRAZOLE 20 MG/1
20 CAPSULE, DELAYED RELEASE ORAL DAILY PRN
Qty: 30 CAPSULE | Refills: 0 | Status: SHIPPED | OUTPATIENT
Start: 2023-07-05

## 2023-07-05 NOTE — ASSESSMENT & PLAN NOTE
Reviewed last urology consult note in 2023. Reviewed last MRI in 3/2023 with followin. PI-RADSv2.1 Category 5 - Very high (clinically significant cancer is highly likely to be present). Lesion in the right transition zone at base and mid gland. 2. No extraprostatic tumor, seminal vesicle invasion, pelvic lymphadenopathy, or pelvic osseous metastatic disease. 3. Calculated prostate volume of 36 cc.    and biopsy in 2023 confirmed diagnosis of prostate adenocarcinoma. Reviewed CT pelvis in 2023. Plan for radical prostatectomy and cleared for surgery with low risk for cardiac complications.
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
## 2023-07-05 NOTE — ASSESSMENT & PLAN NOTE
Previously on omeprazole 40mg as needed, will titrate dose down to 20mg as needed, intermittent episodes of heartburn with eating pork or certain foods. Avoid dietary triggers and follow-up for EGD as history of eosinophilic esophagitis per last EGD in 1/2023 and due for repeat in 1 year.
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
## 2023-07-05 NOTE — ASSESSMENT & PLAN NOTE
History of L inguinal hernia repair with recurrence of L inguinal hernia per CT pelvis in 6/2023. Follow-up general surgery for repeat repair.

## 2023-07-09 ENCOUNTER — ANESTHESIA EVENT (OUTPATIENT)
Dept: PERIOP | Facility: HOSPITAL | Age: 58
End: 2023-07-09
Payer: COMMERCIAL

## 2023-07-09 NOTE — ANESTHESIA PREPROCEDURE EVALUATION
Procedure:  PROSTATECTOMY RADICAL AND PELVIC LYMPH NODE DISSECTION LAPAROSCOPIC W/ ROBOTICS (Abdomen)    Relevant Problems   CARDIO   (+) Pure hypertriglyceridemia      GI/HEPATIC   (+) Gastroesophageal reflux disease with esophagitis without hemorrhage   (+) Hepatic steatosis      /RENAL   (+) Prostate cancer (HCC)        Physical Exam    Airway    Mallampati score: III  TM Distance: >3 FB  Neck ROM: full     Dental   No notable dental hx     Cardiovascular  Rhythm: regular, Rate: normal, Cardiovascular exam normal    Pulmonary  Pulmonary exam normal Breath sounds clear to auscultation,     Other Findings        Anesthesia Plan  ASA Score- 2     Anesthesia Type- general with ASA Monitors. Additional Monitors: arterial line. Airway Plan: ETT. Comment: Risks/benefits and alternatives discussed with patient including likely possibility of PONV and sore throat, as well as the rare possibilities of aspiration, dental/oropharyngeal/ocular injuries, or grave/life threatening anesthetic and surgical emergencies. .       Plan Factors-    Patient summary reviewed. Patient instructed to abstain from smoking on day of procedure. Patient did not smoke on day of surgery. Induction- intravenous. Postoperative Plan- Plan for postoperative opioid use. Planned trial extubation    Informed Consent- Anesthetic plan and risks discussed with patient. I personally reviewed this patient with the CRNA. Discussed and agreed on the Anesthesia Plan with the CRNA. Stalin Hurd

## 2023-07-10 ENCOUNTER — HOSPITAL ENCOUNTER (OUTPATIENT)
Facility: HOSPITAL | Age: 58
Setting detail: OUTPATIENT SURGERY
Discharge: HOME | End: 2023-07-11
Attending: UROLOGY | Admitting: UROLOGY
Payer: COMMERCIAL

## 2023-07-10 ENCOUNTER — ANESTHESIA (OUTPATIENT)
Dept: PERIOP | Facility: HOSPITAL | Age: 58
End: 2023-07-10
Payer: COMMERCIAL

## 2023-07-10 DIAGNOSIS — C61 PROSTATE CANCER (HCC): Primary | ICD-10-CM

## 2023-07-10 LAB
ABO GROUP BLD: NORMAL
ANION GAP SERPL CALCULATED.3IONS-SCNC: 5 MMOL/L
BLD GP AB SCN SERPL QL: NEGATIVE
BUN SERPL-MCNC: 14 MG/DL (ref 5–25)
CALCIUM SERPL-MCNC: 8.4 MG/DL (ref 8.4–10.2)
CHLORIDE SERPL-SCNC: 105 MMOL/L (ref 96–108)
CO2 SERPL-SCNC: 25 MMOL/L (ref 21–32)
CREAT SERPL-MCNC: 0.75 MG/DL (ref 0.6–1.3)
ERYTHROCYTE [DISTWIDTH] IN BLOOD BY AUTOMATED COUNT: 12.9 % (ref 11.6–15.1)
GFR SERPL CREATININE-BSD FRML MDRD: 101 ML/MIN/1.73SQ M
GLUCOSE P FAST SERPL-MCNC: 116 MG/DL (ref 65–99)
GLUCOSE SERPL-MCNC: 116 MG/DL (ref 65–140)
HCT VFR BLD AUTO: 38.8 % (ref 36.5–49.3)
HGB BLD-MCNC: 13 G/DL (ref 12–17)
MCH RBC QN AUTO: 30.5 PG (ref 26.8–34.3)
MCHC RBC AUTO-ENTMCNC: 33.5 G/DL (ref 31.4–37.4)
MCV RBC AUTO: 91 FL (ref 82–98)
PLATELET # BLD AUTO: 226 THOUSANDS/UL (ref 149–390)
PMV BLD AUTO: 9.6 FL (ref 8.9–12.7)
POTASSIUM SERPL-SCNC: 4.2 MMOL/L (ref 3.5–5.3)
RBC # BLD AUTO: 4.26 MILLION/UL (ref 3.88–5.62)
RH BLD: POSITIVE
SODIUM SERPL-SCNC: 135 MMOL/L (ref 135–147)
SPECIMEN EXPIRATION DATE: NORMAL
WBC # BLD AUTO: 15.74 THOUSAND/UL (ref 4.31–10.16)

## 2023-07-10 PROCEDURE — 86901 BLOOD TYPING SEROLOGIC RH(D): CPT | Performed by: UROLOGY

## 2023-07-10 PROCEDURE — NC001 PR NO CHARGE: Performed by: UROLOGY

## 2023-07-10 PROCEDURE — 88307 TISSUE EXAM BY PATHOLOGIST: CPT | Performed by: PATHOLOGY

## 2023-07-10 PROCEDURE — 55866 LAPS SURG PRST8ECT RPBIC RAD: CPT | Performed by: PHYSICIAN ASSISTANT

## 2023-07-10 PROCEDURE — NC001 PR NO CHARGE: Performed by: PHYSICIAN ASSISTANT

## 2023-07-10 PROCEDURE — 55866 LAPS SURG PRST8ECT RPBIC RAD: CPT | Performed by: UROLOGY

## 2023-07-10 PROCEDURE — 88309 TISSUE EXAM BY PATHOLOGIST: CPT | Performed by: PATHOLOGY

## 2023-07-10 PROCEDURE — 86850 RBC ANTIBODY SCREEN: CPT | Performed by: UROLOGY

## 2023-07-10 PROCEDURE — 86920 COMPATIBILITY TEST SPIN: CPT

## 2023-07-10 PROCEDURE — 80048 BASIC METABOLIC PNL TOTAL CA: CPT | Performed by: UROLOGY

## 2023-07-10 PROCEDURE — 88305 TISSUE EXAM BY PATHOLOGIST: CPT | Performed by: PATHOLOGY

## 2023-07-10 PROCEDURE — 38571 LAPAROSCOPY LYMPHADENECTOMY: CPT | Performed by: UROLOGY

## 2023-07-10 PROCEDURE — 85027 COMPLETE CBC AUTOMATED: CPT | Performed by: UROLOGY

## 2023-07-10 PROCEDURE — 86900 BLOOD TYPING SEROLOGIC ABO: CPT | Performed by: UROLOGY

## 2023-07-10 RX ORDER — LIDOCAINE HYDROCHLORIDE 10 MG/ML
INJECTION, SOLUTION EPIDURAL; INFILTRATION; INTRACAUDAL; PERINEURAL AS NEEDED
Status: DISCONTINUED | OUTPATIENT
Start: 2023-07-10 | End: 2023-07-10

## 2023-07-10 RX ORDER — ONDANSETRON 2 MG/ML
4 INJECTION INTRAMUSCULAR; INTRAVENOUS EVERY 6 HOURS PRN
Status: DISCONTINUED | OUTPATIENT
Start: 2023-07-10 | End: 2023-07-11 | Stop reason: HOSPADM

## 2023-07-10 RX ORDER — DEXAMETHASONE SODIUM PHOSPHATE 10 MG/ML
INJECTION, SOLUTION INTRAMUSCULAR; INTRAVENOUS AS NEEDED
Status: DISCONTINUED | OUTPATIENT
Start: 2023-07-10 | End: 2023-07-10

## 2023-07-10 RX ORDER — ACETAMINOPHEN 325 MG/1
650 TABLET ORAL EVERY 6 HOURS
Status: DISCONTINUED | OUTPATIENT
Start: 2023-07-10 | End: 2023-07-11 | Stop reason: HOSPADM

## 2023-07-10 RX ORDER — FENTANYL CITRATE/PF 50 MCG/ML
25 SYRINGE (ML) INJECTION
Status: DISCONTINUED | OUTPATIENT
Start: 2023-07-10 | End: 2023-07-10 | Stop reason: HOSPADM

## 2023-07-10 RX ORDER — HEPARIN SODIUM 5000 [USP'U]/ML
5000 INJECTION, SOLUTION INTRAVENOUS; SUBCUTANEOUS EVERY 8 HOURS SCHEDULED
Status: DISCONTINUED | OUTPATIENT
Start: 2023-07-10 | End: 2023-07-11 | Stop reason: HOSPADM

## 2023-07-10 RX ORDER — GABAPENTIN 100 MG/1
100 CAPSULE ORAL 3 TIMES DAILY
Status: DISCONTINUED | OUTPATIENT
Start: 2023-07-10 | End: 2023-07-11 | Stop reason: HOSPADM

## 2023-07-10 RX ORDER — ONDANSETRON 2 MG/ML
4 INJECTION INTRAMUSCULAR; INTRAVENOUS ONCE AS NEEDED
Status: DISCONTINUED | OUTPATIENT
Start: 2023-07-10 | End: 2023-07-10 | Stop reason: HOSPADM

## 2023-07-10 RX ORDER — LIDOCAINE HYDROCHLORIDE 10 MG/ML
0.5 INJECTION, SOLUTION EPIDURAL; INFILTRATION; INTRACAUDAL; PERINEURAL ONCE AS NEEDED
Status: DISCONTINUED | OUTPATIENT
Start: 2023-07-10 | End: 2023-07-10 | Stop reason: HOSPADM

## 2023-07-10 RX ORDER — PANTOPRAZOLE SODIUM 20 MG/1
20 TABLET, DELAYED RELEASE ORAL
Status: DISCONTINUED | OUTPATIENT
Start: 2023-07-11 | End: 2023-07-11 | Stop reason: HOSPADM

## 2023-07-10 RX ORDER — PROPOFOL 10 MG/ML
INJECTION, EMULSION INTRAVENOUS AS NEEDED
Status: DISCONTINUED | OUTPATIENT
Start: 2023-07-10 | End: 2023-07-10

## 2023-07-10 RX ORDER — SENNOSIDES 8.6 MG
1 TABLET ORAL DAILY
Status: DISCONTINUED | OUTPATIENT
Start: 2023-07-10 | End: 2023-07-11 | Stop reason: HOSPADM

## 2023-07-10 RX ORDER — DOCUSATE SODIUM 100 MG/1
100 CAPSULE, LIQUID FILLED ORAL 2 TIMES DAILY
Status: DISCONTINUED | OUTPATIENT
Start: 2023-07-10 | End: 2023-07-11 | Stop reason: HOSPADM

## 2023-07-10 RX ORDER — FENTANYL CITRATE 50 UG/ML
INJECTION, SOLUTION INTRAMUSCULAR; INTRAVENOUS AS NEEDED
Status: DISCONTINUED | OUTPATIENT
Start: 2023-07-10 | End: 2023-07-10

## 2023-07-10 RX ORDER — HYDROMORPHONE HCL/PF 1 MG/ML
0.5 SYRINGE (ML) INJECTION EVERY 2 HOUR PRN
Status: DISCONTINUED | OUTPATIENT
Start: 2023-07-10 | End: 2023-07-11 | Stop reason: HOSPADM

## 2023-07-10 RX ORDER — EPHEDRINE SULFATE 50 MG/ML
INJECTION INTRAVENOUS AS NEEDED
Status: DISCONTINUED | OUTPATIENT
Start: 2023-07-10 | End: 2023-07-10

## 2023-07-10 RX ORDER — MAGNESIUM HYDROXIDE 1200 MG/15ML
LIQUID ORAL AS NEEDED
Status: DISCONTINUED | OUTPATIENT
Start: 2023-07-10 | End: 2023-07-10 | Stop reason: HOSPADM

## 2023-07-10 RX ORDER — PROPOFOL 10 MG/ML
INJECTION, EMULSION INTRAVENOUS CONTINUOUS PRN
Status: DISCONTINUED | OUTPATIENT
Start: 2023-07-10 | End: 2023-07-10

## 2023-07-10 RX ORDER — MIDAZOLAM HYDROCHLORIDE 2 MG/2ML
INJECTION, SOLUTION INTRAMUSCULAR; INTRAVENOUS AS NEEDED
Status: DISCONTINUED | OUTPATIENT
Start: 2023-07-10 | End: 2023-07-10

## 2023-07-10 RX ORDER — ROCURONIUM BROMIDE 10 MG/ML
INJECTION, SOLUTION INTRAVENOUS AS NEEDED
Status: DISCONTINUED | OUTPATIENT
Start: 2023-07-10 | End: 2023-07-10

## 2023-07-10 RX ORDER — ALBUTEROL SULFATE 2.5 MG/3ML
2.5 SOLUTION RESPIRATORY (INHALATION) ONCE AS NEEDED
Status: DISCONTINUED | OUTPATIENT
Start: 2023-07-10 | End: 2023-07-10 | Stop reason: HOSPADM

## 2023-07-10 RX ORDER — SODIUM CHLORIDE, SODIUM LACTATE, POTASSIUM CHLORIDE, CALCIUM CHLORIDE 600; 310; 30; 20 MG/100ML; MG/100ML; MG/100ML; MG/100ML
125 INJECTION, SOLUTION INTRAVENOUS CONTINUOUS
Status: DISCONTINUED | OUTPATIENT
Start: 2023-07-10 | End: 2023-07-11

## 2023-07-10 RX ORDER — CEFAZOLIN SODIUM 1 G/50ML
1000 SOLUTION INTRAVENOUS ONCE
Status: COMPLETED | OUTPATIENT
Start: 2023-07-10 | End: 2023-07-10

## 2023-07-10 RX ORDER — GLYCOPYRROLATE 0.2 MG/ML
INJECTION INTRAMUSCULAR; INTRAVENOUS AS NEEDED
Status: DISCONTINUED | OUTPATIENT
Start: 2023-07-10 | End: 2023-07-10

## 2023-07-10 RX ORDER — CEFAZOLIN SODIUM 1 G/50ML
1000 SOLUTION INTRAVENOUS EVERY 8 HOURS
Status: COMPLETED | OUTPATIENT
Start: 2023-07-10 | End: 2023-07-11

## 2023-07-10 RX ORDER — HYDROMORPHONE HCL/PF 1 MG/ML
SYRINGE (ML) INJECTION AS NEEDED
Status: DISCONTINUED | OUTPATIENT
Start: 2023-07-10 | End: 2023-07-10

## 2023-07-10 RX ORDER — ONDANSETRON 2 MG/ML
INJECTION INTRAMUSCULAR; INTRAVENOUS AS NEEDED
Status: DISCONTINUED | OUTPATIENT
Start: 2023-07-10 | End: 2023-07-10

## 2023-07-10 RX ORDER — BUPIVACAINE HYDROCHLORIDE 2.5 MG/ML
INJECTION, SOLUTION EPIDURAL; INFILTRATION; INTRACAUDAL AS NEEDED
Status: DISCONTINUED | OUTPATIENT
Start: 2023-07-10 | End: 2023-07-10 | Stop reason: HOSPADM

## 2023-07-10 RX ORDER — HYDROMORPHONE HCL/PF 1 MG/ML
0.5 SYRINGE (ML) INJECTION
Status: DISCONTINUED | OUTPATIENT
Start: 2023-07-10 | End: 2023-07-10 | Stop reason: HOSPADM

## 2023-07-10 RX ORDER — METOCLOPRAMIDE HYDROCHLORIDE 5 MG/ML
10 INJECTION INTRAMUSCULAR; INTRAVENOUS ONCE AS NEEDED
Status: DISCONTINUED | OUTPATIENT
Start: 2023-07-10 | End: 2023-07-10 | Stop reason: HOSPADM

## 2023-07-10 RX ORDER — NEOSTIGMINE METHYLSULFATE 1 MG/ML
INJECTION INTRAVENOUS AS NEEDED
Status: DISCONTINUED | OUTPATIENT
Start: 2023-07-10 | End: 2023-07-10

## 2023-07-10 RX ORDER — SODIUM CHLORIDE, SODIUM LACTATE, POTASSIUM CHLORIDE, CALCIUM CHLORIDE 600; 310; 30; 20 MG/100ML; MG/100ML; MG/100ML; MG/100ML
100 INJECTION, SOLUTION INTRAVENOUS CONTINUOUS
Status: DISCONTINUED | OUTPATIENT
Start: 2023-07-10 | End: 2023-07-11

## 2023-07-10 RX ADMIN — NEOSTIGMINE METHYLSULFATE 3 MG: 1 INJECTION INTRAVENOUS at 11:27

## 2023-07-10 RX ADMIN — PROPOFOL 20 MG: 10 INJECTION, EMULSION INTRAVENOUS at 07:38

## 2023-07-10 RX ADMIN — ROCURONIUM BROMIDE 5 MG: 10 INJECTION, SOLUTION INTRAVENOUS at 10:48

## 2023-07-10 RX ADMIN — ACETAMINOPHEN 650 MG: 325 TABLET, FILM COATED ORAL at 21:03

## 2023-07-10 RX ADMIN — PROPOFOL 150 MCG/KG/MIN: 10 INJECTION, EMULSION INTRAVENOUS at 07:55

## 2023-07-10 RX ADMIN — CEFAZOLIN SODIUM 1000 MG: 1 SOLUTION INTRAVENOUS at 07:50

## 2023-07-10 RX ADMIN — ACETAMINOPHEN 650 MG: 325 TABLET, FILM COATED ORAL at 14:41

## 2023-07-10 RX ADMIN — HYDROMORPHONE HYDROCHLORIDE 0.5 MG: 1 INJECTION, SOLUTION INTRAMUSCULAR; INTRAVENOUS; SUBCUTANEOUS at 09:33

## 2023-07-10 RX ADMIN — FENTANYL CITRATE 50 MCG: 50 INJECTION INTRAMUSCULAR; INTRAVENOUS at 07:36

## 2023-07-10 RX ADMIN — DEXAMETHASONE SODIUM PHOSPHATE 10 MG: 10 INJECTION, SOLUTION INTRAMUSCULAR; INTRAVENOUS at 08:10

## 2023-07-10 RX ADMIN — SODIUM CHLORIDE, SODIUM LACTATE, POTASSIUM CHLORIDE, AND CALCIUM CHLORIDE: .6; .31; .03; .02 INJECTION, SOLUTION INTRAVENOUS at 10:21

## 2023-07-10 RX ADMIN — GABAPENTIN 100 MG: 100 CAPSULE ORAL at 21:03

## 2023-07-10 RX ADMIN — ROCURONIUM BROMIDE 30 MG: 10 INJECTION, SOLUTION INTRAVENOUS at 08:14

## 2023-07-10 RX ADMIN — CEFAZOLIN SODIUM 1000 MG: 1 SOLUTION INTRAVENOUS at 16:34

## 2023-07-10 RX ADMIN — MIDAZOLAM HYDROCHLORIDE 1 MG: 1 INJECTION, SOLUTION INTRAMUSCULAR; INTRAVENOUS at 07:34

## 2023-07-10 RX ADMIN — PROPOFOL 150 MG: 10 INJECTION, EMULSION INTRAVENOUS at 07:36

## 2023-07-10 RX ADMIN — ONDANSETRON 4 MG: 2 INJECTION INTRAMUSCULAR; INTRAVENOUS at 11:04

## 2023-07-10 RX ADMIN — SODIUM CHLORIDE, SODIUM LACTATE, POTASSIUM CHLORIDE, AND CALCIUM CHLORIDE: .6; .31; .03; .02 INJECTION, SOLUTION INTRAVENOUS at 07:25

## 2023-07-10 RX ADMIN — MIDAZOLAM HYDROCHLORIDE 1 MG: 1 INJECTION, SOLUTION INTRAMUSCULAR; INTRAVENOUS at 07:31

## 2023-07-10 RX ADMIN — EPHEDRINE SULFATE 10 MG: 50 INJECTION INTRAVENOUS at 08:05

## 2023-07-10 RX ADMIN — SODIUM CHLORIDE, SODIUM LACTATE, POTASSIUM CHLORIDE, AND CALCIUM CHLORIDE 100 ML/HR: .6; .31; .03; .02 INJECTION, SOLUTION INTRAVENOUS at 14:42

## 2023-07-10 RX ADMIN — HEPARIN SODIUM 5000 UNITS: 5000 INJECTION INTRAVENOUS; SUBCUTANEOUS at 14:47

## 2023-07-10 RX ADMIN — FENTANYL CITRATE 25 MCG: 50 INJECTION INTRAMUSCULAR; INTRAVENOUS at 11:04

## 2023-07-10 RX ADMIN — FENTANYL CITRATE 50 MCG: 50 INJECTION INTRAMUSCULAR; INTRAVENOUS at 10:20

## 2023-07-10 RX ADMIN — GABAPENTIN 100 MG: 100 CAPSULE ORAL at 18:59

## 2023-07-10 RX ADMIN — HYDROMORPHONE HYDROCHLORIDE 0.5 MG: 1 INJECTION, SOLUTION INTRAMUSCULAR; INTRAVENOUS; SUBCUTANEOUS at 07:57

## 2023-07-10 RX ADMIN — SUGAMMADEX 100 MG: 100 INJECTION, SOLUTION INTRAVENOUS at 11:47

## 2023-07-10 RX ADMIN — DOCUSATE SODIUM 100 MG: 100 CAPSULE, LIQUID FILLED ORAL at 18:59

## 2023-07-10 RX ADMIN — SENNOSIDES 8.6 MG: 8.6 TABLET, FILM COATED ORAL at 14:41

## 2023-07-10 RX ADMIN — LIDOCAINE HYDROCHLORIDE 50 MG: 10 INJECTION, SOLUTION EPIDURAL; INFILTRATION; INTRACAUDAL; PERINEURAL at 07:36

## 2023-07-10 RX ADMIN — FENTANYL CITRATE 50 MCG: 50 INJECTION INTRAMUSCULAR; INTRAVENOUS at 07:57

## 2023-07-10 RX ADMIN — ROCURONIUM BROMIDE 20 MG: 10 INJECTION, SOLUTION INTRAVENOUS at 09:14

## 2023-07-10 RX ADMIN — ROCURONIUM BROMIDE 50 MG: 10 INJECTION, SOLUTION INTRAVENOUS at 07:37

## 2023-07-10 RX ADMIN — HEPARIN SODIUM 5000 UNITS: 5000 INJECTION INTRAVENOUS; SUBCUTANEOUS at 21:04

## 2023-07-10 RX ADMIN — GLYCOPYRROLATE 0.4 MG: 0.2 INJECTION, SOLUTION INTRAMUSCULAR; INTRAVENOUS at 11:27

## 2023-07-10 RX ADMIN — FENTANYL CITRATE 25 MCG: 50 INJECTION INTRAMUSCULAR; INTRAVENOUS at 11:15

## 2023-07-10 RX ADMIN — SUGAMMADEX 100 MG: 100 INJECTION, SOLUTION INTRAVENOUS at 11:42

## 2023-07-10 RX ADMIN — ROCURONIUM BROMIDE 20 MG: 10 INJECTION, SOLUTION INTRAVENOUS at 10:01

## 2023-07-10 NOTE — PLAN OF CARE
Problem: GENITOURINARY - ADULT  Goal: Absence of urinary retention  Description: INTERVENTIONS:  - Assess patient’s ability to void and empty bladder  - Monitor I/O  - Bladder scan as needed  - Discuss with physician/AP medications to alleviate retention as needed  - Discuss catheterization for long term situations as appropriate  Outcome: Progressing  Goal: Urinary catheter remains patent  Description: INTERVENTIONS:  - Assess patency of urinary catheter  - If patient has a chronic mar, consider changing catheter if non-functioning  - Follow guidelines for intermittent irrigation of non-functioning urinary catheter  Outcome: Progressing     Problem: HEMATOLOGIC - ADULT  Goal: Maintains hematologic stability  Description: INTERVENTIONS  - Assess for signs and symptoms of bleeding or hemorrhage  - Monitor labs  - Administer supportive blood products/factors as ordered and appropriate  Outcome: Progressing     Problem: PAIN - ADULT  Goal: Verbalizes/displays adequate comfort level or baseline comfort level  Description: Interventions:  - Encourage patient to monitor pain and request assistance  - Assess pain using appropriate pain scale  - Administer analgesics based on type and severity of pain and evaluate response  - Implement non-pharmacological measures as appropriate and evaluate response  - Consider cultural and social influences on pain and pain management  - Notify physician/advanced practitioner if interventions unsuccessful or patient reports new pain  Outcome: Progressing     Problem: INFECTION - ADULT  Goal: Absence or prevention of progression during hospitalization  Description: INTERVENTIONS:  - Assess and monitor for signs and symptoms of infection  - Monitor lab/diagnostic results  - Monitor all insertion sites, i.e. indwelling lines, tubes, and drains  - Monitor endotracheal if appropriate and nasal secretions for changes in amount and color  - [place] appropriate cooling/warming therapies per order  - Administer medications as ordered  - Instruct and encourage patient and family to use good hand hygiene technique  - Identify and instruct in appropriate isolation precautions for identified infection/condition  Outcome: Progressing  Goal: Absence of fever/infection during neutropenic period  Description: INTERVENTIONS:  - Monitor WBC    Outcome: Progressing     Problem: MOBILITY - ADULT  Goal: Maintain or return to baseline ADL function  Description: INTERVENTIONS:  -  Assess patient's ability to carry out ADLs; assess patient's baseline for ADL function and identify physical deficits which impact ability to perform ADLs (bathing, care of mouth/teeth, toileting, grooming, dressing, etc.)  - Assess/evaluate cause of self-care deficits   - Assess range of motion  - Assess patient's mobility; develop plan if impaired  - Assess patient's need for assistive devices and provide as appropriate  - Encourage maximum independence but intervene and supervise when necessary  - Involve family in performance of ADLs  - Assess for home care needs following discharge   - Consider OT consult to assist with ADL evaluation and planning for discharge  - Provide patient education as appropriate  Outcome: Progressing  Goal: Maintains/Returns to pre admission functional level  Description: INTERVENTIONS:  - Perform BMAT or MOVE assessment daily.   - Set and communicate daily mobility goal to care team and patient/family/caregiver. - Collaborate with rehabilitation services on mobility goals if consulted  - Perform Range of Motion  times a day. - Reposition patient every  hours.   - Dangle patient  times a day  - Stand patient  times a day  - Ambulate patient  times a day  - Out of bed to chair  times a day   - Out of bed for meals times a day  - Out of bed for toileting  - Record patient progress and toleration of activity level   Outcome: Progressing

## 2023-07-10 NOTE — PLAN OF CARE
Problem: GENITOURINARY - ADULT  Goal: Absence of urinary retention  Description: INTERVENTIONS:  - Assess patient’s ability to void and empty bladder  - Monitor I/O  - Bladder scan as needed  - Discuss with physician/AP medications to alleviate retention as needed  - Discuss catheterization for long term situations as appropriate  Outcome: Progressing  Goal: Urinary catheter remains patent  Description: INTERVENTIONS:  - Assess patency of urinary catheter  - If patient has a chronic mar, consider changing catheter if non-functioning  - Follow guidelines for intermittent irrigation of non-functioning urinary catheter  Outcome: Progressing     Problem: HEMATOLOGIC - ADULT  Goal: Maintains hematologic stability  Description: INTERVENTIONS  - Assess for signs and symptoms of bleeding or hemorrhage  - Monitor labs  - Administer supportive blood products/factors as ordered and appropriate  Outcome: Progressing     Problem: PAIN - ADULT  Goal: Verbalizes/displays adequate comfort level or baseline comfort level  Description: Interventions:  - Encourage patient to monitor pain and request assistance  - Assess pain using appropriate pain scale  - Administer analgesics based on type and severity of pain and evaluate response  - Implement non-pharmacological measures as appropriate and evaluate response  - Consider cultural and social influences on pain and pain management  - Notify physician/advanced practitioner if interventions unsuccessful or patient reports new pain  Outcome: Progressing     Problem: INFECTION - ADULT  Goal: Absence or prevention of progression during hospitalization  Description: INTERVENTIONS:  - Assess and monitor for signs and symptoms of infection  - Monitor lab/diagnostic results  - Monitor all insertion sites, i.e. indwelling lines, tubes, and drains  - Monitor endotracheal if appropriate and nasal secretions for changes in amount and color  - [place] appropriate cooling/warming therapies per order  - Administer medications as ordered  - Instruct and encourage patient and family to use good hand hygiene technique  - Identify and instruct in appropriate isolation precautions for identified infection/condition  Outcome: Progressing  Goal: Absence of fever/infection during neutropenic period  Description: INTERVENTIONS:  - Monitor WBC    Outcome: Progressing

## 2023-07-10 NOTE — OP NOTE
OPERATIVE REPORT  PATIENT NAME: Emilia Mahmood    :  1965  MRN: [id number]  Pt Location: AN OR ROOM 04    SURGERY DATE: 7/10/2023    Surgeon(s) and Role:     * Petty Jerome MD - Primary     * Gene Valentine PA-C - Assisting    An assistant was needed at the bedside for help with tissue retraction and suctioning and suture passage    Preop Diagnosis:  Prostate cancer ([address]) Sadiq Pham  Recurrent left inguinal hernia    Post-Op Diagnosis Codes:     * Prostate cancer ([address]) [C61]  Recurrent left inguinal hernia  Rectal distention    Procedure(s):  ROBOTIC RADICAL PROSTATECTOMY. Reduction of left inguinal hernia sac  Bilateral PELVIC LYMPH NODE DISSECTION      Specimen(s):  ID Type Source Tests Collected by Time Destination   1 : left pelvic lymph node Tissue Lymph Node TISSUE EXAM Petty Jerome MD 7/10/2023 1008    2 : right pelvic lymph node Tissue Lymph Node TISSUE EXAM Petty Jerome MD 7/10/2023 1009    3 : posterior bladder neck margine Tissue Urinary Bladder TISSUE EXAM Petty Jerome MD 7/10/2023 1012    4 : prostate and seminal vesicles Tissue Prostate TISSUE EXAM Petty Jerome MD 7/10/2023 1104        Estimated Blood Loss:   150 mL    Drains:  Closed/Suction Drain LLQ Bulb 19 Fr. (Active)   Number of days: 0       Urethral Catheter Other (Comment) 20 Fr. (Active)   Number of days: 0       Anesthesia Type:   General    Operative Indications:  Patient with intermediate risk prostate cancer with MRI showing PI-RADS lesion on the right aspect of the prostate confirmed on biopsy to only be on the right side of the gland including region of interest.  The patient had evidence of recurrent left inguinal hernia on imaging and case discussed with general surgery with decision made to proceed with robotic prostatectomy and address hernia at a later date.     Operative Findings:  Tight urethral meatus required dilation to allow catheter passage  Left inguinal hernia with mostly fat and some sigmoid with mesh seen. Hernia sac retracted and excised from the inguinal canal.  Left sigmoid colon quite adherent to the sidewall  The rectum was significantly distended which hampered vision throughout the case  Prostatectomy done with aggressive left neurovascular bundle sparing. No evidence of leak at end of surgery. Complications:   None      DESCRIPTION OF PROCEDURE:    After informed consent was verified, the patient was taken to the operating room and placed in the supine position. He received intravenous antibiosis as per the guidelines. He had bilateral lower extremity sequential compression devices placed for DVT prophylaxis. After anesthesia was induced, the patient's arms were tucked with foam rolls and his chest was secured with strap. He was kept in supine position with foam anti-friction pad. He was prepped with ChloraPrep which was allowed to dry for 3 minutes. The patient was draped in sterile fashion. A surgical time-out was performed to confirm the proper patient, position, and procedure. The patient's urethral meatus was tight and required dilation to facilitate passage of Delgado catheter. The Veress needle was placed though the midline above umbilicus, and we confirmed we were within the abdominal cavity with a water drop test.  After the abdomen was insufflated to 15mmHg, a 8 mm robotic port was placed. We then placed the 0 degree robotic camera to inspect the abdomen. From our insufflation and trocar placement, there where no visualized injuries to the bowel or any vessels. We then placed the remainder of the trocars under direct vision. Two 8-mm robotic trocars were placed 8 cm away from the midline trocar in a straight line  A lateral 8 millimeter trocar was placed in the patient's left side approximately 8 cm away from the more medial robotic trocar and at the same level as the camera trocar.   On the patient's right side, a 12-mm trocar was placed laterally and a 5-mm port was placed more medially for the 1st assistant. The daVinci Xi surgical system was then docked. In the fourth robotic arm on the left, we placed Prograsp forceps. In the left second arm, a bipolar forceps was placed. In the surgeon's right hand, monopolar scissors were used. The patient's abdomen and pelvis were examined with the patient in full Trendelenburg position with the robot docked. This revealed obvious left inguinal hernia with the sigmoid creeping into the canal.  As was pulled back large globular fat was seen adherent to mesh which was attached to the lateral aspect of the inguinal canal and not over taking the canal as expected for mesh plug. As the fat was pulled back small divisions to the mesh were seen and divided sharply. This freed the hernia sac from the inguinal canal.  The sigmoid colon was quite distended as was the rectum consistent with a constipated stool burden. The sigmoid was also adherent to the left lateral wall. These adhesions were thick and irregular and carefully divided to help access the true pelvis. The true pelvis was then visualized and the area anterior to the rectum posterior to the bladder was the area of focus. The peritoneum overlying the posterior surface of the bladder was widely incised with monopolar cautery. After this a peritoneal flap was developed posterior to the bladder and anterior to the rectum and the plane of Denonvilliers fascia was identified and dissected. The right and then the left Vasa deferentia were identified and dissected free of the surrounding tissue and blood supply minimizing the use of electrocautery. The right and left seminal vesicles were similarly dissected free of their surrounding blood supply minimizing use of electrocautery. The structures were grasped and retracted cephalad and anteriorly to further delineate the plane between the rectum and the bladder.  The posterior dissection posterior to the prostate was developed and the rectum was gently dissected away from the prostate which was challenging because of rectal distention. The bladder was then dropped by developing the space of Retzius both bluntly and sharply until the pubic arch was visualized. The prostate was exposed, and the endopelvic fascia was incised in an athermal fashion bilaterally. The puboprostatic ligaments were also released bilaterally. The junction between the bladder and the prostate was then identified both the anatomic landmarks and via bouncing the Delgado catheter balloon. The anterior bladder neck was taken down to the level of the Delgado catheter after placing the prostate on cephalad traction with use of the 4th robotic arm. The Delgado was then retracted superiorly and the previously dissected posterior structures were developed anteriorly through the intentional hole in Denonvilliers fascia. The lateral pedicles were then identified and the lateral prostatic fascia was gently teased away to further delineate the vascular structures. The patient's rectum was quite distended and somewhat adherent to the posterior aspect of the prostate. To avoid rectal injury the prostate was closely hugged on posterior dissection with the scissors used to carefully snip attachments and push the rectum away. We used a vessel sealer to divide the right neurovascular bundle tissue. The vascular was also used to perform an aggressive left neurovascular bundle sparing up to the urethra. We proceeded with dissection laterally until the apices were reached bilaterally. The apices were then carefully dissected free from surrounding tissue using a snip and push technique, preserving the normal anatomical structures. Next, the junction between the apex of the prostate and the urethra were identified. We incised the dorsal vein duncan with minimal electrocautery.   We then incised the periurethral tissue without cautery until the urethral catheter was identified. We incised the posterior urethra and the rectourethralis. The prostate and seminal vesicles were then freed and placed in an Endo-Catch bag for later retrieval.    We did a standard bilateral pelvic lymphadenectomy. The obturator nerves were visualized and intact at the end of dissection bilaterally. .  The pressure was lowered and the operative field was irrigated. The rectal wall was inspected and found to be intact. A few small bleeders were controlled with judicious cautery. The bladder neck was inspected and there did not appear to be any obvious prostatic tissue present. A small sample of the posterior bladder neck tissue was sent for final bladder neck margin. A 3-0 stratafix suture was used to approximate the posterior bladder tissue to the rectourethralis posterior to the urethra. The vesicourethral anastomosis was accomplished with a running 3-0 V-loc stitch bilaterally. We placed a new urethral catheter and no leak was seen when irrigated. The balloon was inflated with 15 cc volume of sterile water    The 12 mm system port was closed with the aid of a 0 Vicryl suture and Endo Stitch. A drain was placed through the left lateral port. All ports were removed under vision. We enlarged the periumbilical port and removed the specimen within the entrapment bag without violation. All sponge, needle, and instrument counts were correct times two. We closed the fascia with two running 0 Vicryl sutures. Finger sweep string fascial closure ensure that no intra-abdominal contents were incorporated into the closure. .  All secondary port sites were irrigated and a Marcaine block was applied. The skin was closed with Monocryl and covered with skin glue. The patient was taken to the recovery room in stable condition. A bedside PA assistant was needed for this case due for suction, multiple tissue manipulations and passage of sutures.     PLAN:  The patient will remain in hospital overnight, his labs, vital signs, urine output, and SANDIP drain output will be monitored. He will ambulate and mobilize, his diet will be advanced, he will likely be discharged home with his Delgado catheter to a leg bag either tomorrow or the following day. He will follow up with me (or a nurse or advanced practitioner) in 1 weeks time for Delgado removal and trial of void. When his pathology is available we will discuss this with him to determine his appropriate follow-up regimen as per the NCCN guidelines.   Patient Disposition:  PACU         SIGNATURE: Nawaf Huerta MD  DATE: July 10, 2023  TIME: 11:24 AM
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
## 2023-07-10 NOTE — ANESTHESIA POSTPROCEDURE EVALUATION
Post-Op Assessment Note    CV Status:  Stable  Pain Score: 0    Pain management: adequate     Mental Status:  Arousable and sleepy   Hydration Status:  Euvolemic and stable   PONV Controlled:  Controlled   Airway Patency:  Patent and adequate      Post Op Vitals Reviewed: Yes      Staff: CRNA         No notable events documented.     BP   106/56   Temp      Pulse 83   Resp 16   SpO2 95% simple mask 6L

## 2023-07-10 NOTE — DISCHARGE INSTR - AVS FIRST PAGE
Mr. Stauffer Has,     Your robotic surgery overall went well. We will plan to see you back in the clinic to remove your catheter and then shortly after we will see you again to go over the pathology results and evaluate your healing. Continue to care for your mar catheter as instructed by nurses. Keep your incisions clean and dry. The glue on your skin will fall off on its own in 3-4 weeks. Drink plenty of fluids and eat when you are hungry. You may shower at will, but do not submerge yourself in a bath or hot tub for 2 weeks. It is normal to have some drainage around your catheter. You may feel the need to urinate and you may notice some leakage around the catheter during this time. You need not worry about this, but only worry if the catheter is not draining at all. You may also notice a creamy yellow brown substance around where the catheter enters the penis, these are normal periurethral secretions and they do not signify infection. You have been prescribed an aggressive bowel regimen. Compliance with this regimen is important to avoid constipation and straining. You are encouraged to walk around and climb steps, but you should not do any lifting  of anything heavier than 10 pounds for 6 weeks. A jug of milk weighs roughly 10 pounds, and as such do not lift anything heavier than the jug of milk. Please call us with any significant questions/concerns. MD [facility] [phone]      Robot Assisted Laparoscopic Prostatectomy   WHAT YOU NEED TO KNOW:   Robot-assisted laparoscopic prostatectomy (RALP) is surgery to remove your prostate gland through small incisions in your abdomen. RALP is done with a machine that is controlled by your surgeon. The machine has mechanical arms that use small tools to remove your prostate. DISCHARGE INSTRUCTIONS:   Call your local emergency number ([phone] in the [address]) for any of the following:    You have chest pain when you take a deep breath or cough. You cough up blood. You suddenly feel lightheaded and short of breath. Call your surgeon or uro-oncologist if:   Your arm or leg feels warm, tender, and painful. It may look swollen and red. You have more blood in your urine than you were told to expect, or you pass a blood clot. You have an upset stomach or are vomiting. You have painful swelling in your abdomen that does not go away. You have a fever. Your pain is getting worse, even with medicine. You have an incision that is red, swollen, or has pus coming from it. You are urinating less than usual.    You have trouble urinating or having a bowel movement. You have questions or concerns about your condition or care. Medicines: You may need any of the following:  Prescription pain medicine  may be given. Ask your healthcare provider how to take this medicine safely. Some prescription pain medicines contain acetaminophen. Do not take other medicines that contain acetaminophen without talking to your healthcare provider. Too much acetaminophen may cause liver damage. Prescription pain medicine may cause constipation. Ask your healthcare provider how to prevent or treat constipation. Antibiotics  prevent or fight an infection caused by bacteria. Blood thinners  help prevent blood clots. Clots can cause strokes, heart attacks, and death. The following are general safety guidelines to follow while you are taking a blood thinner:    Watch for bleeding and bruising while you take blood thinners. Watch for bleeding from your gums or nose. Watch for blood in your urine and bowel movements. Use a soft washcloth on your skin, and a soft toothbrush to brush your teeth. This can keep your skin and gums from bleeding. If you shave, use an electric shaver. Do not play contact sports. Tell your dentist and other healthcare providers that you take a blood thinner.  Wear a bracelet or necklace that says you take this medicine. Do not start or stop any other medicines unless your healthcare provider tells you to. Many medicines cannot be used with blood thinners. Take your blood thinner exactly as prescribed by your healthcare provider. Do not skip does or take less than prescribed. Tell your provider right away if you forget to take your blood thinner, or if you take too much. Warfarin  is a blood thinner that you may need to take. The following are things you should be aware of if you take warfarin:     Foods and medicines can affect the amount of warfarin in your blood. Do not make major changes to your diet while you take warfarin. Warfarin works best when you eat about the same amount of vitamin K every day. Vitamin K is found in green leafy vegetables and certain other foods. Ask for more information about what to eat when you are taking warfarin. You will need to see your healthcare provider for follow-up visits when you are on warfarin. You will need regular blood tests. These tests are used to decide how much medicine you need. Take your medicine as directed. Contact your healthcare provider if you think your medicine is not helping or if you have side effects. Tell him or her if you are allergic to any medicine. Keep a list of the medicines, vitamins, and herbs you take. Include the amounts, and when and why you take them. Bring the list or the pill bottles to follow-up visits. Carry your medicine list with you in case of an emergency. Surgery area care: Follow your surgeon's directions on how to care for the area. Ask when you can start showering or bathing. You may need to keep the area covered so it does not get wet. Delgado catheter care:  Keep the bag below your waist. If the bag is too high, urine will flow back into your bladder. This can cause an infection. Do not pull on the catheter. This may cause pain and bleeding, and the catheter may come out.  Do not let the catheter tubing kink or twist. A kink or twist will block the flow of urine. Bladder control:  After surgery, you may leak urine and have trouble controlling when you urinate. The following can help decrease or manage urine leakage:  Do not have caffeine. Caffeine can cause problems with bladder control and increase your need to urinate. Limit liquids. Drink smaller amounts of liquid throughout the day. Do not drink before bedtime. Ask if you should decrease the amount of liquid you drink each day. This may help you control your bladder. Wear a pad or adult diapers, if needed. These may help to absorb leaking urine and decrease odor. Do pelvic floor muscle exercises. Pelvic floor muscle exercises, also called Kegels, may help improve your bladder control. These exercises are done by tightening and relaxing your pelvic muscles. Ask how to do pelvic floor muscle exercises, and how often to do them. Self-care:   Rest as needed. You may feel like resting more after surgery. Slowly start to do more each day. Ask when it is okay to return to your normal daily activities. You may need to wait 4 to 6 weeks after surgery. Your healthcare provider will tell you about the activities you should avoid after your surgery. These may include driving while you are taking pain medicines or until your catheter is removed. You may also be given a weight restriction on lifting objects. Walk when your healthcare provider says more activity is okay. Walking is an important activity to help with your recovery after surgery. Walking is a good way to improve your overall health and help you recover sooner. It also helps keep your blood flowing and reduces the risk of blood clots. Other types of exercise can also be an important part of your recovery. Ask about the exercises that are safe for you. Ask when it is okay to start having sex again. You may have trouble having an erection.  Your erections may return with time. Medicines and mechanical aids may help. Talk to your healthcare provider about these options. Follow up with your surgeon or uro-oncologist in 1 week or as directed: You will need your urinary catheter removed. Tests will show if any cancer remains in your body after surgery. Write down your questions so you remember to ask them during your visits. © Copyright [employer] 2022 Information is for End User's use only and may not be sold, redistributed or otherwise used for commercial purposes. All illustrations and images included in CareNotes® are the copyrighted property of [employer] or [address] [place] [address]  The above information is an  only. It is not intended as medical advice for individual conditions or treatments. Talk to your doctor, nurse or pharmacist before following any medical regimen to see if it is safe and effective for you.

## 2023-07-11 VITALS
HEART RATE: 75 BPM | RESPIRATION RATE: 17 BRPM | DIASTOLIC BLOOD PRESSURE: 47 MMHG | OXYGEN SATURATION: 91 % | SYSTOLIC BLOOD PRESSURE: 105 MMHG | TEMPERATURE: 98.3 F

## 2023-07-11 LAB
ANION GAP SERPL CALCULATED.3IONS-SCNC: 3 MMOL/L
BUN SERPL-MCNC: 11 MG/DL (ref 5–25)
CALCIUM SERPL-MCNC: 8.3 MG/DL (ref 8.4–10.2)
CHLORIDE SERPL-SCNC: 107 MMOL/L (ref 96–108)
CO2 SERPL-SCNC: 26 MMOL/L (ref 21–32)
CREAT SERPL-MCNC: 0.62 MG/DL (ref 0.6–1.3)
ERYTHROCYTE [DISTWIDTH] IN BLOOD BY AUTOMATED COUNT: 12.8 % (ref 11.6–15.1)
GFR SERPL CREATININE-BSD FRML MDRD: 110 ML/MIN/1.73SQ M
GLUCOSE P FAST SERPL-MCNC: 114 MG/DL (ref 65–99)
GLUCOSE SERPL-MCNC: 114 MG/DL (ref 65–140)
HCT VFR BLD AUTO: 36.3 % (ref 36.5–49.3)
HGB BLD-MCNC: 12.1 G/DL (ref 12–17)
MCH RBC QN AUTO: 30.5 PG (ref 26.8–34.3)
MCHC RBC AUTO-ENTMCNC: 33.3 G/DL (ref 31.4–37.4)
MCV RBC AUTO: 91 FL (ref 82–98)
PLATELET # BLD AUTO: 199 THOUSANDS/UL (ref 149–390)
PMV BLD AUTO: 10.1 FL (ref 8.9–12.7)
POTASSIUM SERPL-SCNC: 4.2 MMOL/L (ref 3.5–5.3)
RBC # BLD AUTO: 3.97 MILLION/UL (ref 3.88–5.62)
SODIUM SERPL-SCNC: 136 MMOL/L (ref 135–147)
WBC # BLD AUTO: 12.1 THOUSAND/UL (ref 4.31–10.16)

## 2023-07-11 PROCEDURE — 99024 POSTOP FOLLOW-UP VISIT: CPT | Performed by: UROLOGY

## 2023-07-11 PROCEDURE — 80048 BASIC METABOLIC PNL TOTAL CA: CPT | Performed by: UROLOGY

## 2023-07-11 PROCEDURE — 85027 COMPLETE CBC AUTOMATED: CPT | Performed by: UROLOGY

## 2023-07-11 PROCEDURE — NC001 PR NO CHARGE: Performed by: UROLOGY

## 2023-07-11 RX ORDER — DOCUSATE SODIUM 100 MG/1
100 CAPSULE, LIQUID FILLED ORAL 2 TIMES DAILY
Qty: 28 CAPSULE | Refills: 0 | Status: SHIPPED | OUTPATIENT
Start: 2023-07-11 | End: 2023-07-25

## 2023-07-11 RX ORDER — OXYCODONE HYDROCHLORIDE 5 MG/1
5 TABLET ORAL EVERY 4 HOURS PRN
Status: DISCONTINUED | OUTPATIENT
Start: 2023-07-11 | End: 2023-07-11 | Stop reason: HOSPADM

## 2023-07-11 RX ORDER — OXYCODONE HYDROCHLORIDE 5 MG/1
5 TABLET ORAL EVERY 6 HOURS PRN
Qty: 10 TABLET | Refills: 0 | Status: SHIPPED | OUTPATIENT
Start: 2023-07-11 | End: 2023-07-18 | Stop reason: ALTCHOICE

## 2023-07-11 RX ORDER — SENNOSIDES 8.6 MG
8.6 TABLET ORAL
Qty: 14 TABLET | Refills: 0 | Status: SHIPPED | OUTPATIENT
Start: 2023-07-11 | End: 2023-07-25

## 2023-07-11 RX ADMIN — PANTOPRAZOLE SODIUM 20 MG: 20 TABLET, DELAYED RELEASE ORAL at 05:18

## 2023-07-11 RX ADMIN — ACETAMINOPHEN 650 MG: 325 TABLET, FILM COATED ORAL at 08:07

## 2023-07-11 RX ADMIN — HEPARIN SODIUM 5000 UNITS: 5000 INJECTION INTRAVENOUS; SUBCUTANEOUS at 14:04

## 2023-07-11 RX ADMIN — SODIUM CHLORIDE, SODIUM LACTATE, POTASSIUM CHLORIDE, AND CALCIUM CHLORIDE 1000 ML: .6; .31; .03; .02 INJECTION, SOLUTION INTRAVENOUS at 00:43

## 2023-07-11 RX ADMIN — ACETAMINOPHEN 650 MG: 325 TABLET, FILM COATED ORAL at 02:31

## 2023-07-11 RX ADMIN — SODIUM CHLORIDE, SODIUM LACTATE, POTASSIUM CHLORIDE, AND CALCIUM CHLORIDE 100 ML/HR: .6; .31; .03; .02 INJECTION, SOLUTION INTRAVENOUS at 00:35

## 2023-07-11 RX ADMIN — GABAPENTIN 100 MG: 100 CAPSULE ORAL at 16:10

## 2023-07-11 RX ADMIN — OXYCODONE HYDROCHLORIDE 5 MG: 5 TABLET ORAL at 12:47

## 2023-07-11 RX ADMIN — CEFAZOLIN SODIUM 1000 MG: 1 SOLUTION INTRAVENOUS at 00:35

## 2023-07-11 RX ADMIN — DOCUSATE SODIUM 100 MG: 100 CAPSULE, LIQUID FILLED ORAL at 08:07

## 2023-07-11 RX ADMIN — ACETAMINOPHEN 650 MG: 325 TABLET, FILM COATED ORAL at 12:46

## 2023-07-11 RX ADMIN — GABAPENTIN 100 MG: 100 CAPSULE ORAL at 08:07

## 2023-07-11 RX ADMIN — SENNOSIDES 8.6 MG: 8.6 TABLET, FILM COATED ORAL at 08:07

## 2023-07-11 RX ADMIN — HEPARIN SODIUM 5000 UNITS: 5000 INJECTION INTRAVENOUS; SUBCUTANEOUS at 05:18

## 2023-07-11 NOTE — PLAN OF CARE
Problem: GENITOURINARY - ADULT  Goal: Absence of urinary retention  Description: INTERVENTIONS:  - Assess patient’s ability to void and empty bladder  - Monitor I/O  - Bladder scan as needed  - Discuss with physician/AP medications to alleviate retention as needed  - Discuss catheterization for long term situations as appropriate  Outcome: Progressing  Goal: Urinary catheter remains patent  Description: INTERVENTIONS:  - Assess patency of urinary catheter  - If patient has a chronic mar, consider changing catheter if non-functioning  - Follow guidelines for intermittent irrigation of non-functioning urinary catheter  Outcome: Progressing     Problem: HEMATOLOGIC - ADULT  Goal: Maintains hematologic stability  Description: INTERVENTIONS  - Assess for signs and symptoms of bleeding or hemorrhage  - Monitor labs  - Administer supportive blood products/factors as ordered and appropriate  Outcome: Progressing     Problem: PAIN - ADULT  Goal: Verbalizes/displays adequate comfort level or baseline comfort level  Description: Interventions:  - Encourage patient to monitor pain and request assistance  - Assess pain using appropriate pain scale  - Administer analgesics based on type and severity of pain and evaluate response  - Implement non-pharmacological measures as appropriate and evaluate response  - Consider cultural and social influences on pain and pain management  - Notify physician/advanced practitioner if interventions unsuccessful or patient reports new pain  Outcome: Progressing     Problem: INFECTION - ADULT  Goal: Absence or prevention of progression during hospitalization  Description: INTERVENTIONS:  - Assess and monitor for signs and symptoms of infection  - Monitor lab/diagnostic results  - Monitor all insertion sites, i.e. indwelling lines, tubes, and drains  - Monitor endotracheal if appropriate and nasal secretions for changes in amount and color  - [place] appropriate cooling/warming therapies per order  - Administer medications as ordered  - Instruct and encourage patient and family to use good hand hygiene technique  - Identify and instruct in appropriate isolation precautions for identified infection/condition  Outcome: Progressing  Goal: Absence of fever/infection during neutropenic period  Description: INTERVENTIONS:  - Monitor WBC    Outcome: Progressing     Problem: MOBILITY - ADULT  Goal: Maintain or return to baseline ADL function  Description: INTERVENTIONS:  -  Assess patient's ability to carry out ADLs; assess patient's baseline for ADL function and identify physical deficits which impact ability to perform ADLs (bathing, care of mouth/teeth, toileting, grooming, dressing, etc.)  - Assess/evaluate cause of self-care deficits   - Assess range of motion  - Assess patient's mobility; develop plan if impaired  - Assess patient's need for assistive devices and provide as appropriate  - Encourage maximum independence but intervene and supervise when necessary  - Involve family in performance of ADLs  - Assess for home care needs following discharge   - Consider OT consult to assist with ADL evaluation and planning for discharge  - Provide patient education as appropriate  Outcome: Progressing  Goal: Maintains/Returns to pre admission functional level  Description: INTERVENTIONS:  - Perform BMAT or MOVE assessment daily.   - Set and communicate daily mobility goal to care team and patient/family/caregiver. - Collaborate with rehabilitation services on mobility goals if consulted  - Perform Range of Motion  times a day. - Reposition patient every  hours.   - Dangle patient  times a day  - Stand patient  times a day  - Ambulate patient  times a day  - Out of bed to chair  times a day   - Out of bed for meal times a day  - Out of bed for toileting  - Record patient progress and toleration of activity level   Outcome: Progressing

## 2023-07-11 NOTE — PROGRESS NOTES
Progress Note - Urology  Praveen Betancourt 1965, 63 y.o. male MRN: [id number]    Unit/Bed#: S -01 Encounter: [id number]    Assessment & Plan:  1. Prostate Cancer  - POD1 robotic radical prostatectomy, reduction of left inguinal hernia sac, bilateral pelvic lymph node dissection with Dr. Rosas Oar  - VSS, afebrile  - Tolerating normal diet. No N/V. Passing gas  - 24 hour UOP 1480 mL  - Labs stable. Mild leukocytosis 12 reactive  - SANDIP with minimal output overnight 45 mL. SANDIP removed in afternoon  - Patient ambulating in hallways. Reports pain worsened with movement, not uncontrolled. - Patient stable for discharge today. Subjective:   HPI: Seen at bedside with family. No overnight events. Limited ambulation in the morning. Review of Systems   Constitutional: Negative for chills and fever. Respiratory: Negative for cough and shortness of breath. Cardiovascular: Negative for chest pain and palpitations. Gastrointestinal: Negative for abdominal pain and vomiting. Genitourinary: Negative for dysuria and hematuria. Musculoskeletal: Negative for arthralgias and back pain. Skin: Negative for color change and rash. Neurological: Negative for seizures and syncope. All other systems reviewed and are negative. Objective:  Vitals: Blood pressure 96/56, pulse 60, temperature 98.2 °F (36.8 °C), resp. rate 14, SpO2 93 %. ,There is no height or weight on file to calculate BMI. Physical Exam  Constitutional:       General: He is not in acute distress. Appearance: Normal appearance. He is normal weight. He is not ill-appearing or toxic-appearing. HENT:      Head: Normocephalic and atraumatic. Right Ear: External ear normal.      Left Ear: External ear normal.      Nose: Nose normal.      Mouth/Throat:      Mouth: Mucous membranes are moist.   Eyes:      General: No scleral icterus.      Conjunctiva/sclera: Conjunctivae normal.   Cardiovascular:      Rate and Rhythm: Normal rate and regular rhythm. Pulses: Normal pulses. Heart sounds: Normal heart sounds. No murmur heard. No friction rub. No gallop. Pulmonary:      Effort: Pulmonary effort is normal. No respiratory distress. Breath sounds: Normal breath sounds. No stridor. No wheezing, rhonchi or rales. Abdominal:      Comments: Surgical incisions clean dry. Well approximated. No erythema, drainage. SANDIP draining serosanginous. Genitourinary:     Comments: Delgado draining clear yellow urine  Musculoskeletal:         General: Normal range of motion. Cervical back: Normal range of motion. Skin:     General: Skin is warm. Findings: No rash. Neurological:      General: No focal deficit present. Mental Status: He is alert and oriented to person, place, and time. Mental status is at baseline. Psychiatric:         Mood and Affect: Mood normal.         Behavior: Behavior normal.         Thought Content:  Thought content normal.         Judgment: Judgment normal.           Labs:  Recent Labs     07/10/23  1204 07/11/23  0514   WBC 15.74* 12.10*     Recent Labs     07/10/23  1204 07/11/23  0514   HGB 13.0 12.1     Recent Labs     07/10/23  1204 07/11/23  0514   HCT 38.8 36.3*     Recent Labs     07/10/23  1204 07/11/23  0514   CREATININE 0.75 0.62         History:  Past Medical History:   Diagnosis Date   • Elevated PSA    • Hepatic steatosis    • Hepatomegaly    • High cholesterol    • Neck pain     takes robaxin prn     Social History     Socioeconomic History   • Marital status: /Civil Union     Spouse name: None   • Number of children: None   • Years of education: None   • Highest education level: None   Occupational History   • None   Tobacco Use   • Smoking status: Never   • Smokeless tobacco: Never   Vaping Use   • Vaping Use: Never used   Substance and Sexual Activity   • Alcohol use: Yes     Comment: socially   • Drug use: Never   • Sexual activity: Yes     Partners: Female   Other Topics Concern • None   Social History Narrative   • None     Social Determinants of Health     Financial Resource Strain: Not on file   Food Insecurity: Not on file   Transportation Needs: Not on file   Physical Activity: Not on file   Stress: Not on file   Social Connections: Not on file   Intimate Partner Violence: Not on file   Housing Stability: Not on file     Past Surgical History:   Procedure Laterality Date   • HERNIA REPAIR     • NE BX PROSTATE STRTCTC SATURATION SAMPLING IMG GID N/A 05/16/2023    Procedure: TRANSPERINEAL MRI FUSION BIOPSY PROSTATE;  Surgeon: Thaddeus Heimlich, MD;  Location: [facility];  Service: Urology   • NE LAPS SURG [id number] RPBIC RAD W/NRV SPARING ROBOT N/A 7/10/2023    Procedure: ROBOTIC RADICAL PROSTATECTOMY, PELVIC LYMPH NODE DISSECTION;  Surgeon: Diana Bahena MD;  Location: [facility];  Service: Urology   • PROSTATE BIOPSY  08/2021    benign   • VARICOSE VEIN SURGERY       Family History   Problem Relation Age of Onset   • Cancer Mother        Corina Holmanmp, [employer]  Date: 7/11/2023 Time: 3:26 PM

## 2023-07-11 NOTE — DISCHARGE SUMMARY
Discharge Summary - Antionette Andrade 63 y.o. male MRN: [id number]    Unit/Bed#: S -18 Encounter: [id number]    Admission Date: 7/10/2023     Discharge Date: 07/11/23    HPI: Antionette Andrade is a 63 y.o. male who presented for evaluation of prostate cancer. Patient discussed risk versus benefits with Dr. Elisha Rai. Patient elected for robotic assisted laparoscopic radical prostatectomy with pelvic lymph node dissection. Procedure(s):  ROBOTIC RADICAL PROSTATECTOMY, PELVIC LYMPH NODE DISSECTION  Surgeon(s):  MD Leo Gonzalez PA-C  7/10/2023    Hospital Course: Patient presented on 7/10/2023 for robotic radical prostatectomy, pelvic lymph node dissection with Dr. Elisha Rai. The operation went as planned. There were no complications. See op note for more details. Postop day 1 patient was progressing well. Vital signs stable, afebrile. Abdomen soft, nondistended, minimal incisional tenderness. SANDIP output overnight 45 mL. Diet was advanced. Patient was passing gas. Patient tolerating diet. Patient with limited mobility in the morning, nursing assisting with patient ambulation independently in the afternoon. SANDIP drain was removed in the afternoon. Reassessed patient on the afternoon of 7/11/2022 and patient was deemed stable for discharge. Discharge Diagnosis: Prostate cancer ([address])    Condition at Discharge: good    Discharge Medications:  See after visit summary for reconciled discharge medications provided to patient and family. Patient was discharged home on home medications with the addition of senna, Colace oxycodone, Tylenol,    Discharge instructions/Information to patient and family:   See after visit summary for written and verbal information which has been provided to patient and family. Provisions for Follow-Up Care:  See after visit summary for information related to follow-up care and any pertinent home health orders.       Disposition: Home    Planned Readmission: No    Discharge Statement   I spent 20 minutes discharging the patient. This time was spent on the day of discharge. I had direct contact with the patient on the day of discharge. Additional documentation is required if more than 30 minutes were spent on discharge.      Signature:   Iain Sim PA-C  Date: 7/11/2023 Time: 3:46 PM

## 2023-07-12 ENCOUNTER — TELEPHONE (OUTPATIENT)
Dept: UROLOGY | Facility: CLINIC | Age: 58
End: 2023-07-12

## 2023-07-12 ENCOUNTER — TRANSITIONAL CARE MANAGEMENT (OUTPATIENT)
Dept: FAMILY MEDICINE CLINIC | Facility: CLINIC | Age: 58
End: 2023-07-12

## 2023-07-12 LAB
ABO GROUP BLD BPU: NORMAL
ABO GROUP BLD BPU: NORMAL
BPU ID: NORMAL
BPU ID: NORMAL
CROSSMATCH: NORMAL
CROSSMATCH: NORMAL
UNIT DISPENSE STATUS: NORMAL
UNIT DISPENSE STATUS: NORMAL
UNIT PRODUCT CODE: NORMAL
UNIT PRODUCT CODE: NORMAL
UNIT PRODUCT VOLUME: 300 ML
UNIT PRODUCT VOLUME: 350 ML
UNIT RH: NORMAL
UNIT RH: NORMAL

## 2023-07-12 NOTE — TELEPHONE ENCOUNTER
Post Op Note    Evelia Lucio is a 63 y.o. male s/p RALP performed 7/10/23. Evelia Lucio is a patient of Dr. Manish Macias and is seen at the [facility] office. Attempted calling patient, but voicemail box is not set up. Patient's wife is listed to speak with on comm consent. Attempted to call [place], and her voicemail box is not set up either. Will try again later.

## 2023-07-12 NOTE — TELEPHONE ENCOUNTER
Post Op Note    Patient called office back. How would you rate your pain on a scale from 1 to 10, 10 being the worst pain ever? Patient states he is not in any pain currently. Have you had a fever? No  Have your bowel movements been regular? Yes  Do you have any difficulty urinating? No, has Mar catheter  If the patient has an incision- do you have any redness around the incision or any drainage from the incision? No  If the patient has a mar- are you comfortable caring for your mar? Yes Is it draining urine? Yes  Do you have any other questions or concerns that I can address at this time? Patient is aware of his TOV appointment on 7/17/23 @ 0830 and [address] [facility] office, and his post op appt with Dr Marycarmen Carroll to review pathology on 7/28/23 @ [facility] office. Advised him to call the office if any issues arise or if he experiences any abnormal symptoms such as fever. Patient verbalized understanding and agrees to plan.

## 2023-07-13 ENCOUNTER — TELEPHONE (OUTPATIENT)
Dept: NEUROSURGERY | Facility: CLINIC | Age: 58
End: 2023-07-13

## 2023-07-13 PROCEDURE — 88307 TISSUE EXAM BY PATHOLOGIST: CPT | Performed by: PATHOLOGY

## 2023-07-13 PROCEDURE — 88305 TISSUE EXAM BY PATHOLOGIST: CPT | Performed by: PATHOLOGY

## 2023-07-13 PROCEDURE — 88309 TISSUE EXAM BY PATHOLOGIST: CPT | Performed by: PATHOLOGY

## 2023-07-13 NOTE — TELEPHONE ENCOUNTER
7/13/23 -   DAHLIA SCOTT  @[employer]     AUTO CLAIM #[id number]  DATE OF INJURY - 6/14/23  INS NAME - [employer]  [address] [address]  [place] [address]  CLAIM STATUS - OPEN & BILLABLE   ADJ.  Ying Vera  [phone]

## 2023-07-17 ENCOUNTER — PROCEDURE VISIT (OUTPATIENT)
Dept: UROLOGY | Facility: CLINIC | Age: 58
End: 2023-07-17

## 2023-07-17 DIAGNOSIS — C61 PROSTATE CANCER (HCC): Primary | ICD-10-CM

## 2023-07-17 PROCEDURE — 99024 POSTOP FOLLOW-UP VISIT: CPT

## 2023-07-17 NOTE — PROGRESS NOTES
7/17/2023    Lencho Sanchezogomero  1965  [id number]    Diagnosis      Patient presents for mar removal and void trial managed by Dr. Isai Terrell  Will return as scheduled for post op     Procedure Mar removal/voiding trial    Mar catheter removed after deflation of an intact balloon. Patient tolerated well. Encouraged patient to hydrate well and return this afternoon for post void residual.  he knows he return early if uncomfortable and unable to urinate. Patient agrees to this plan. Educated patient and friend, who helped ensure proper translation, on kegel exercises. They verbalized understanding and will begin working on them. Patient was called this afternoon. Patient states able to void. Patient reports continuous leaking as well as voiding in the toilet. He feels as if he is emptying and is declining returning to office this afternoon. Knows to call office with any further concerns     No results found for this or any previous visit (from the past 4 hour(s)). There were no vitals filed for this visit.         Martin Mata RN

## 2023-07-18 ENCOUNTER — OFFICE VISIT (OUTPATIENT)
Dept: FAMILY MEDICINE CLINIC | Facility: CLINIC | Age: 58
End: 2023-07-18
Payer: COMMERCIAL

## 2023-07-18 VITALS
WEIGHT: 149 LBS | RESPIRATION RATE: 16 BRPM | BODY MASS INDEX: 27.42 KG/M2 | TEMPERATURE: 100.2 F | SYSTOLIC BLOOD PRESSURE: 102 MMHG | DIASTOLIC BLOOD PRESSURE: 60 MMHG | OXYGEN SATURATION: 98 % | HEART RATE: 72 BPM | HEIGHT: 62 IN

## 2023-07-18 DIAGNOSIS — K40.91 UNILATERAL RECURRENT INGUINAL HERNIA WITHOUT OBSTRUCTION OR GANGRENE: Primary | ICD-10-CM

## 2023-07-18 PROCEDURE — 99495 TRANSJ CARE MGMT MOD F2F 14D: CPT | Performed by: FAMILY MEDICINE

## 2023-07-18 RX ORDER — TERBINAFINE HYDROCHLORIDE 250 MG/1
TABLET ORAL
COMMUNITY
Start: 2023-07-17

## 2023-07-18 NOTE — PROGRESS NOTES
TCM Call     Date and time call was made  2023  2:04 PM    Patient was hospitialized at  [facility]    Date of Admission  07/10/23    Date of discharge  23    Disposition  Home    Current Symptoms  None      TCM Call     Post hospital issues  None    Scheduled for follow up? Yes    Did you obtain your prescribed medications  Yes    Do you need help managing your prescriptions or medications  No    Is transportation to your appointment needed  No    I have advised the patient to call PCP with any new or worsening symptoms  [place]    Living Arrangements  Family members    Have you fallen in the last 12 months  No    Interperter language line needed  No        ADULT [facility]    NAME: Avni Jane  AGE: 63 y.o. SEX: male  : 1965     DATE: 2023     Assessment and Plan:     Problem List Items Addressed This Visit    None  Visit Diagnoses     left recurrent inguinal hernia without obstruction or gangrene    -  Primary    Relevant Orders    Ambulatory referral to General Surgery    BMI 27.0-27.9,adult              Patient had a radical prostatectomy Dr. Pau Booth on 7/10/2023 due to prostate cancer. The pathological report left pelvic lymph nodes benign, right pelvic lipoma benign, urinary bladder posterior bladder neck margin: Benign smooth muscle and connective tissue. Prostate and seminal vesicles: Prostatic adenocarcinoma, Danny grade 7, grade group 2. We discussed this result with patient. Patient continue follow-up with urology. Recommendations about erectile dysfunction, time to wait for second intercourse) was discussed with patient. Immunizations and preventive care screenings were discussed with patient today. Appro priate education was printed on patient's after visit summary. Discussed risks and benefits of prostate cancer screening.  We discussed the controversial history of PSA screening for prostate cancer in the [facility] as well as the risk of over detection and over treatment of prostate cancer by way of PSA screening. The patient understands that PSA blood testing is an imperfect way to screen for prostate cancer and that elevated PSA levels in the blood may also be caused by infection, inflammation, prostatic trauma or manipulation, urological procedures, or by benign prostatic enlargement. The role of the digital rectal examination in prostate cancer screening was also discussed and I discussed with him that there is large interobserver variability in the findings of digital rectal examination. Counseling:  Alcohol/drug use: discussed moderation in alcohol intake, the recommendations for healthy alcohol use, and avoidance of illicit drug use. Dental Health: discussed importance of regular tooth brushing, flossing, and dental visits. Injury prevention: discussed safety/seat belts, safety helmets, smoke detectors, carbon dioxide detectors, and smoking near bedding or upholstery. Sexual health: discussed sexually transmitted diseases, partner selection, use of condoms, avoidance of unintended pregnancy, and contraceptive alternatives. · Exercise: the importance of regular exercise/physical activity was discussed. Recommend exercise 3-5 times per week for at least 30 minutes. No follow-ups on file. Chief Complaint:     Chief Complaint   Patient presents with   • Transition of Care Management     prostatectomy      History of Present Illness:     Adult Annual Physical   Patient here for a comprehensive physical exam. The patient reports no problems. Diet and Physical Activity  · Diet/Nutrition: well balanced diet. · Exercise: no formal exercise. Depression Screening  PHQ-2/9 Depression Screening         General Health  · Sleep: sleeps well. · Hearing: normal - bilateral.  · Vision: no vision problems.    · Dental: regular dental visits.         Health  · Symptoms include: none     Review of Systems:     Review of Systems   Past Medical History:     Past Medical History:   Diagnosis Date   • Elevated PSA    • Hepatic steatosis    • Hepatomegaly    • High cholesterol    • Neck pain     takes robaxin prn      Past Surgical History:     Past Surgical History:   Procedure Laterality Date   • HERNIA REPAIR     • [place] BX PROSTATE STRTCTC SATURATION SAMPLING IMG GID N/A 05/16/2023    Procedure: TRANSPERINEAL MRI FUSION BIOPSY PROSTATE;  Surgeon: Geovani Banegas MD;  [facility];  Service: Urology   • RI LAPS SURG [id number] RPBIC RAD W/NRV SPARING ROBOT N/A 07/10/2023    Procedure: ROBOTIC RADICAL PROSTATECTOMY, PELVIC LYMPH NODE DISSECTION;  Surgeon: Gala Mcclure MD;  [facility];  Service: Urology   • PROSTATE BIOPSY  08/2021    benign   • PROSTATE SURGERY     • VARICOSE VEIN SURGERY        Family History:     Family History   Problem Relation Age of Onset   • Cancer Mother       Social History:     Social History     Socioeconomic History   • Marital status: /Civil Union     Spouse name: None   • Number of children: None   • Years of education: None   • Highest education level: None   Occupational History   • None   Tobacco Use   • Smoking status: Never   • Smokeless tobacco: Never   Vaping Use   • Vaping Use: Never used   Substance and Sexual Activity   • Alcohol use: Yes     Comment: socially   • Drug use: Never   • Sexual activity: Yes     Partners: Female   Other Topics Concern   • None   Social History Narrative   • None     Social Determinants of Health     Financial Resource Strain: Not on file   Food Insecurity: Not on file   Transportation Needs: Not on file   Physical Activity: Not on file   Stress: Not on file   Social Connections: Not on file   Intimate Partner Violence: Not on file   Housing Stability: Not on file      Current Medications:     Current Outpatient Medications   Medication Sig Dispense Refill   • acetaminophen (TYLENOL) 500 mg tablet Take 1,000 mg by mouth every 6 (six) hours as needed for mild pain     • docusate sodium (COLACE) 100 mg capsule Take 1 capsule (100 mg total) by mouth 2 (two) times a day for 14 days 28 capsule 0   • miconazole 2 % cream      • omeprazole (PriLOSEC) 20 mg delayed release capsule Take 1 capsule (20 mg total) by mouth daily as needed (acid reflux) 30 capsule 0   • senna (SENOKOT) 8.6 mg Take 1 tablet (8.6 mg total) by mouth daily at bedtime for 14 days 14 tablet 0   • tadalafil (CIALIS) 20 MG tablet Take 1 tablet (20 mg total) by mouth daily as needed for erectile dysfunction 10 tablet 5   • terbinafine (LamISIL) 250 mg tablet      • triamcinolone (KENALOG) 0.1 % ointment        No current facility-administered medications for this visit.       Allergies:     No Known Allergies   Physical Exam:     /60 (BP Location: Left arm, Patient Position: Sitting, Cuff Size: Standard)   Pulse 72   Temp 100.2 °F (37.9 °C) (Tympanic)   Resp 16   Ht 5' 2" (1.575 m)   Wt 67.6 kg (149 lb)   SpO2 98%   BMI 27.25 kg/m²     Physical Exam     Crissy Helton MD  [address]

## 2023-07-20 ENCOUNTER — TELEPHONE (OUTPATIENT)
Dept: UROLOGY | Facility: CLINIC | Age: 58
End: 2023-07-20

## 2023-07-20 NOTE — TELEPHONE ENCOUNTER
LA PAPERWORK EMAILED  TO  FOR COMPLETION. ALSO SENT TO [address] [facility] FOR CHARGE ENTRY.

## 2023-07-28 ENCOUNTER — OFFICE VISIT (OUTPATIENT)
Dept: UROLOGY | Facility: AMBULATORY SURGERY CENTER | Age: 58
End: 2023-07-28
Payer: COMMERCIAL

## 2023-07-28 VITALS
DIASTOLIC BLOOD PRESSURE: 72 MMHG | SYSTOLIC BLOOD PRESSURE: 120 MMHG | OXYGEN SATURATION: 98 % | HEART RATE: 87 BPM | WEIGHT: 147 LBS | BODY MASS INDEX: 26.89 KG/M2

## 2023-07-28 DIAGNOSIS — N39.3 STRESS INCONTINENCE, MALE: ICD-10-CM

## 2023-07-28 DIAGNOSIS — N52.31 ERECTILE DYSFUNCTION AFTER RADICAL PROSTATECTOMY: ICD-10-CM

## 2023-07-28 DIAGNOSIS — K46.9 RECURRENT HERNIA: ICD-10-CM

## 2023-07-28 DIAGNOSIS — C61 PROSTATE CANCER (HCC): Primary | ICD-10-CM

## 2023-07-28 LAB — POST-VOID RESIDUAL VOLUME, ML POC: 0 ML

## 2023-07-28 PROCEDURE — 51798 US URINE CAPACITY MEASURE: CPT | Performed by: UROLOGY

## 2023-07-28 PROCEDURE — 99024 POSTOP FOLLOW-UP VISIT: CPT | Performed by: UROLOGY

## 2023-07-28 NOTE — ASSESSMENT & PLAN NOTE
The patient's recurrent left inguinal hernia despite mesh was appreciated during surgery. He has an appoint with general surgery in the coming weeks to discuss further.

## 2023-07-28 NOTE — TELEPHONE ENCOUNTER
Spoke to pt and advised FMLA was filled out and emailed to Ofelia Jordan <[email or web address]

## 2023-07-28 NOTE — ASSESSMENT & PLAN NOTE
We attempted aggressive left-sided neurovascular bundle sparing. Discussed erectile dysfunction rehabilitation regiment with vacuum erection device and Trimix. Patient is amenable to trying. We will arrange teaching appointment for Trimix.

## 2023-07-28 NOTE — LETTER
July 28, 2023     Patient: Maria Eugenia Montiel  YOB: 1965  Date of Visit: 7/28/2023      To Whom it May Concern:    Maria Eugenia Montiel is under my professional care. Freddy Bob was seen in my office on 7/28/2023. Freddy Bob had major surgery. He can return to work on August 14th 2023. He needs to be able to use the bathroom frequently at work for at least 3-6 months after. If you have any questions or concerns, please don't hesitate to call.          Sincerely,          Sheila Singer MD        CC: No Recipients

## 2023-07-28 NOTE — PROGRESS NOTES
Assessment/Plan:    Stress incontinence, male  Discussed kegels and referral to PT. Pt amenable    Prostate cancer ([address])  Pt with GG2 N0 R0 cancer. Plan for PSA in 2-3 months    Erectile dysfunction after radical prostatectomy  We attempted aggressive left-sided neurovascular bundle sparing. Discussed erectile dysfunction rehabilitation regiment with vacuum erection device and Trimix. Patient is amenable to trying. We will arrange teaching appointment for Trimix. Recurrent hernia  The patient's recurrent left inguinal hernia despite mesh was appreciated during surgery. He has an appoint with general surgery in the coming weeks to discuss further. Subjective:      Patient ID: Silvia Rudolph is a 63 y.o. male. HPI  Silvia Rudolph is a 63 y.o. old with prostate cancer status post robotic prostatectomy in July 2023     He states he previously received medical care in [facility]. His PSA is historically elevated (Care Everywhere).   He had a negative biopsy at [facility] in 2021. He denies a family history of prostate cancer.      The patient had an MRI in March 2023 showing a PI-RADS 5 lesion on the right anterior mid  prompting fusion guided biopsy which showed Danny grade group 1 and 2 prostate cancer including in the region of interest and other cores on the right side.  No cancer seen on the left side.        PSA and CaP   7/12/21 PSA 10.50  5/13/21 PSA 12.50   8/9/21 negative biopsy   10/2022 PSA 14.5  03/2023 PSA 11.5    The patient elected for robotic prostatectomy with aggressive left neurovascular bundle sparing. Path shows GG2 N0/7 R0 with bladder neck margin negative. He is overall doing well. Reports some pain in the tip of his penis with voids and in his rectum with bowel movements. No fevers or chills. He has a strong stream in the mornings.   He denies leakage at rest but leaks as he moves around and is using diapers.     Prior to surgery the patient was prescribed Tadalafil PRN for sexual activity however he did not use this regularly.      Of note the patient had concern regarding recurrent hernia on the left side which was confirmed on CT scan and case discussed with general surgery who recommended moving forward with prostatectomy and then addressing hernia at a later date. During surgery hernia sac was pulled out of canal.      Past Surgical History:   Procedure Laterality Date   • HERNIA REPAIR     • [place] BX PROSTATE STRTCTC SATURATION SAMPLING IMG GID N/A 05/16/2023    Procedure: TRANSPERINEAL MRI FUSION BIOPSY PROSTATE;  Surgeon: Anya Finney MD;  [facility];  Service: Urology   • [place] LAPS SURG [id number] RPBIC RAD W/NRV SPARING ROBOT N/A 07/10/2023    Procedure: ROBOTIC RADICAL PROSTATECTOMY, PELVIC LYMPH NODE DISSECTION;  Surgeon: Suzan Burton MD;  [facility];  Service: Urology   • PROSTATE BIOPSY  08/2021    benign   • PROSTATE SURGERY     • VARICOSE VEIN SURGERY          Past Medical History:   Diagnosis Date   • Elevated PSA    • Hepatic steatosis    • Hepatomegaly    • High cholesterol    • Neck pain     takes robaxin prn             Review of Systems   Constitutional: Negative for chills and fever. HENT: Negative for ear pain and sore throat. Eyes: Negative for pain and visual disturbance. Respiratory: Negative for cough and shortness of breath. Cardiovascular: Negative for chest pain and palpitations. Gastrointestinal: Negative for abdominal pain and vomiting. Genitourinary: Negative for dysuria and hematuria. Musculoskeletal: Negative for arthralgias and back pain. Skin: Negative for color change and rash. Neurological: Negative for seizures and syncope. All other systems reviewed and are negative.         Objective:      /72   Pulse 87   Wt 66.7 kg (147 lb)   SpO2 98%   BMI 26.89 kg/m²     Lab Results   Component Value Date    PSA 11.5 (H) 03/06/2023    PSA 14.5 (H) 10/29/2022          Physical Exam  Vitals reviewed. Constitutional:       Appearance: Normal appearance. He is normal weight. HENT:      Head: Normocephalic and atraumatic. Eyes:      Pupils: Pupils are equal, round, and reactive to light. Abdominal:      General: Abdomen is flat. There is no distension. Palpations: There is no mass. Tenderness: There is no abdominal tenderness. There is no guarding or rebound. Hernia: No hernia is present. Comments: Incisions are healing well   Neurological:      General: No focal deficit present. Mental Status: He is alert and oriented to person, place, and time. Psychiatric:         Mood and Affect: Mood normal.         Thought Content: Thought content normal.                                                       MD Maryjane                                                                  Specimens:   A) - Lymph Node, left pelvic lymph node                                                              B) - Lymph Node, right pelvic lymph node                                                             C) - Urinary Bladder, posterior bladder neck margine                                                 D) - Prostate, prostate and seminal vesicles                                               Final Diagnosis   A. Left pelvic lymph node:  - Two benign lymph nodes (0/2).      B. Right pelvic lymph node:  - Five benign lymph nodes (0/5).      C. Urinary Bladder, posterior bladder neck margin:  - Benign smooth muscle and connective tissue.      D. Prostate and seminal vesicles:  - Prostatic adenocarcinoma, [place] grade 3+4=7, Prognostic Grade Group 2. See comment and synoptic report.     Comment: 8th Ed AJCC Tumor Stage:  at least Stage IIB - pT2, pN0, G3+4=7.   Representative tumor          Orders  Orders Placed This Encounter   Procedures   • PSA Total, Diagnostic     Standing Status:   Future     Standing Expiration Date:   7/28/2024   • Ambulatory Referral to Physical Therapy Standing Status:   Future     Standing Expiration Date:   7/28/2024     Referral Priority:   Routine     Referral Type:   Physical Therapy     Referral Reason:   Specialty Services Required     Requested Specialty:   Physical Therapy     Number of Visits Requested:   1     Expiration Date:   7/28/2024   • POCT Measure PVR

## 2023-08-08 ENCOUNTER — CONSULT (OUTPATIENT)
Dept: SURGERY | Facility: CLINIC | Age: 58
End: 2023-08-08
Payer: COMMERCIAL

## 2023-08-08 VITALS
HEIGHT: 62 IN | WEIGHT: 147 LBS | TEMPERATURE: 98 F | HEART RATE: 67 BPM | OXYGEN SATURATION: 97 % | BODY MASS INDEX: 27.05 KG/M2 | SYSTOLIC BLOOD PRESSURE: 118 MMHG | DIASTOLIC BLOOD PRESSURE: 68 MMHG

## 2023-08-08 DIAGNOSIS — K40.91 UNILATERAL RECURRENT INGUINAL HERNIA WITHOUT OBSTRUCTION OR GANGRENE: ICD-10-CM

## 2023-08-08 PROCEDURE — 99244 OFF/OP CNSLTJ NEW/EST MOD 40: CPT | Performed by: SPECIALIST

## 2023-08-08 NOTE — LETTER
August 9, 2023     Juan Pablo López MD  [facility]  [facility]  [address]    Patient: Lukas Holly   YOB: 1965   Date of Visit: 8/8/2023       Dear Caludy Sow,    Thank you for referring Lukas Holly to me for evaluation. Below are my notes for this consultation. If you have questions, please do not hesitate to call me. I look forward to following your patient along with you. Sincerely,    Laura Encarnacion MD        CC: No Recipients    Brant Henriquez MD  8/9/2023  5:42 PM  Sign when Signing Visit  Chief Complaint: Recurrent left inguinal hernia      History of Present Illness: Patient is a 46-year-old  male who is an employee of [employer] who presents the office today with a recurrent left inguinal hernia. Patient apparently underwent left inguinal herniorrhaphy 5 years ago in the [place]. Over the past several months he has developed left groin discomfort and a "bump "in his left groin primarily when he stands. Is also aggravated with walking. He denies any GI component. He is actually 1 month status post robotic prostatectomy at [employer]. He has apparently done well from his surgery. He is a patient of Dr. Juan Pablo López who kindly referred him to our office for evaluation of this recurrent left inguinal hernia.       Past Medical History:   Past Medical History:   Diagnosis Date   • Elevated PSA    • Hepatic steatosis    • Hepatomegaly    • High cholesterol    • Neck pain     takes robaxin prn         Past Surgical History:    Past Surgical History:   Procedure Laterality Date   • HERNIA REPAIR     • [place] BX PROSTATE STRTCTC SATURATION SAMPLING IMG GID N/A 05/16/2023    Procedure: TRANSPERINEAL MRI FUSION BIOPSY PROSTATE;  Surgeon: Jim Nixon MD;  [facility];  Service: Urology   • [place] LAPS SURG [id number] RPBIC RAD W/NRV SPARING ROBOT N/A 07/10/2023    Procedure: ROBOTIC RADICAL PROSTATECTOMY, PELVIC LYMPH NODE DISSECTION;  Surgeon: Almas Spence MD;  [facility];  Service: Urology   • PROSTATE BIOPSY  08/2021    benign   • PROSTATE SURGERY     • VARICOSE VEIN SURGERY           Allergies:  No Known Allergies      Medications:    Current Outpatient Medications:   •  acetaminophen (TYLENOL) 500 mg tablet, Take 1,000 mg by mouth every 6 (six) hours as needed for mild pain, Disp: , Rfl:   •  miconazole 2 % cream, , Disp: , Rfl:   •  omeprazole (PriLOSEC) 20 mg delayed release capsule, Take 1 capsule (20 mg total) by mouth daily as needed (acid reflux), Disp: 30 capsule, Rfl: 0  •  terbinafine (LamISIL) 250 mg tablet, , Disp: , Rfl:   •  triamcinolone (KENALOG) 0.1 % ointment, , Disp: , Rfl:   •  docusate sodium (COLACE) 100 mg capsule, Take 1 capsule (100 mg total) by mouth 2 (two) times a day for 14 days, Disp: 28 capsule, Rfl: 0  •  senna (SENOKOT) 8.6 mg, Take 1 tablet (8.6 mg total) by mouth daily at bedtime for 14 days (Patient not taking: Reported on 8/8/2023), Disp: 14 tablet, Rfl: 0  •  tadalafil (CIALIS) 20 MG tablet, Take 1 tablet (20 mg total) by mouth daily as needed for erectile dysfunction (Patient not taking: Reported on 8/8/2023), Disp: 10 tablet, Rfl: 5      Social History:  Social History     Social History     Substance and Sexual Activity   Alcohol Use Yes    Comment: socially     Social History     Substance and Sexual Activity   Drug Use Never     Social History     Tobacco Use   Smoking Status Never   Smokeless Tobacco Never         Family History:    Family History   Problem Relation Age of Onset   • Cancer Mother          Review of Systems:    As per the HPI. He does admit to some urinary incontinence primarily when he moves around.   No weight loss weight gain fever chills night sweats chest pain nausea vomit diarrhea constipation shortness of breath headaches blurry vision double vision sore throat chronic cough etc.    Vitals:  Vitals:    08/08/23 1311   BP: 118/68   Pulse: 67   Temp: 98 °F (36.7 °C)   SpO2: 97%       Physical Exam:  Healthy-appearing adult  male 5 foot 2 inches under 47 pounds. He is awake alert no distress. Vital signs as above    Abdomen: There is a visible bulge in his left groin. He has laparoscopic incisions throughout the abdomen and periumbilical area. The right groin demonstrates no evidence of a hernia. Left groin shows a well-healed scar and a large hernia that is reducible primarily when the patient is a supine position. He is no other abdominal masses or hernias noted. Lab Results: I have personally reviewed pertinent reports. See below. Imaging: I have personally reviewed pertinent CT scan of the abdomen and pelvis  EKG, Pathology, and Other Studies: I have personally reviewed pertinent reports. No visits with results within 1 Day(s) from this visit. Latest known visit with results is:   Office Visit on 07/28/2023   Component Date Value   • POST-VOID RESIDUAL VOLUM* 07/28/2023 0          Impression:  Recurrent left inguinal hernia and patient status post robotic prostatectomy. He is due to return to work next week after his robotic prostatectomy. Repair of his hernia would further delay return to work. He would like to defer hernia repair for several weeks and this is reasonable. Plan: We will schedule repeat appointment in a few months for reevaluation and scheduling his recurrent left inguinal hernia repair. He is encouraged that he develops any increasing pain or swelling in the area he should notify our office and then we would expedite the situation. He understands and agrees.

## 2023-08-08 NOTE — LETTER
August 9, 2023     Vivian Burgess MD  [place]  [facility]    Patient: Jake Angeles   YOB: 1965   Date of Visit: 8/8/2023       Dear Nikia Guajardo,  Thank you for referring Jake Angeles to me for evaluation. Below are my notes for this consultation. If you have questions, please do not hesitate to call me. I look forward to following your patient along with you. Sincerely,        Shweta Roman MD        CC: No Recipients    Shweta Roman MD  8/9/2023  5:42 PM  Sign when Signing Visit  Chief Complaint: Recurrent left inguinal hernia      History of Present Illness: Patient is a 60-year-old  male who is an employee of [employer] who presents the office today with a recurrent left inguinal hernia. Patient apparently underwent left inguinal herniorrhaphy 5 years ago in the [place]. Over the past several months he has developed left groin discomfort and a "bump "in his left groin primarily when he stands. Is also aggravated with walking. He denies any GI component. He is actually 1 month status post robotic prostatectomy at [employer] [facility] [facility]. He has apparently done well from his surgery. He is a patient of Dr. Vivian Burgess who kindly referred him to our office for evaluation of this recurrent left inguinal hernia.       Past Medical History:   Past Medical History:   Diagnosis Date    Elevated PSA     Hepatic steatosis     Hepatomegaly     High cholesterol     Neck pain     takes robaxin prn         Past Surgical History:    Past Surgical History:   Procedure Laterality Date    HERNIA REPAIR      MI BX PROSTATE STRTCTC SATURATION SAMPLING IMG GID N/A 05/16/2023    Procedure: TRANSPERINEAL MRI FUSION BIOPSY PROSTATE;  Surgeon: Vijay Shah MD;  Location: [facility];  Service: Urology    MI LAPS SURG [id number] RPBIC RAD W/NRV SPARING ROBOT N/A 07/10/2023    Procedure: ROBOTIC RADICAL PROSTATECTOMY, PELVIC LYMPH NODE DISSECTION;  Surgeon: Aye Hansen MD;  [facility];  Service: Urology    PROSTATE BIOPSY  08/2021    benign    PROSTATE SURGERY      VARICOSE VEIN SURGERY           Allergies:  No Known Allergies      Medications:    Current Outpatient Medications:     acetaminophen (TYLENOL) 500 mg tablet, Take 1,000 mg by mouth every 6 (six) hours as needed for mild pain, Disp: , Rfl:     miconazole 2 % cream, , Disp: , Rfl:     omeprazole (PriLOSEC) 20 mg delayed release capsule, Take 1 capsule (20 mg total) by mouth daily as needed (acid reflux), Disp: 30 capsule, Rfl: 0    terbinafine (LamISIL) 250 mg tablet, , Disp: , Rfl:     triamcinolone (KENALOG) 0.1 % ointment, , Disp: , Rfl:     docusate sodium (COLACE) 100 mg capsule, Take 1 capsule (100 mg total) by mouth 2 (two) times a day for 14 days, Disp: 28 capsule, Rfl: 0    senna (SENOKOT) 8.6 mg, Take 1 tablet (8.6 mg total) by mouth daily at bedtime for 14 days (Patient not taking: Reported on 8/8/2023), Disp: 14 tablet, Rfl: 0    tadalafil (CIALIS) 20 MG tablet, Take 1 tablet (20 mg total) by mouth daily as needed for erectile dysfunction (Patient not taking: Reported on 8/8/2023), Disp: 10 tablet, Rfl: 5      Social History:  Social History     Social History     Substance and Sexual Activity   Alcohol Use Yes    Comment: socially     Social History     Substance and Sexual Activity   Drug Use Never     Social History     Tobacco Use   Smoking Status Never   Smokeless Tobacco Never         Family History:    Family History   Problem Relation Age of Onset    Cancer Mother          Review of Systems:    As per the HPI. He does admit to some urinary incontinence primarily when he moves around.   No weight loss weight gain fever chills night sweats chest pain nausea vomit diarrhea constipation shortness of breath headaches blurry vision double vision sore throat chronic cough etc.    Vitals:  Vitals:    08/08/23 1311   BP: 118/68   Pulse: 67   Temp: 98 °F (36.7 °C)   SpO2: 97%       Physical Exam:  Healthy-appearing adult  male 5 foot 2 inches under 47 pounds. He is awake alert no distress. Vital signs as above    Abdomen: There is a visible bulge in his left groin. He has laparoscopic incisions throughout the abdomen and periumbilical area. The right groin demonstrates no evidence of a hernia. Left groin shows a well-healed scar and a large hernia that is reducible primarily when the patient is a supine position. He is no other abdominal masses or hernias noted. Lab Results: I have personally reviewed pertinent reports. See below. Imaging: I have personally reviewed pertinent CT scan of the abdomen and pelvis  EKG, Pathology, and Other Studies: I have personally reviewed pertinent reports. No visits with results within 1 Day(s) from this visit. Latest known visit with results is:   Office Visit on 07/28/2023   Component Date Value    POST-VOID RESIDUAL VOLUM* 07/28/2023 0          Impression:  Recurrent left inguinal hernia and patient status post robotic prostatectomy. He is due to return to work next week after his robotic prostatectomy. Repair of his hernia would further delay return to work. He would like to defer hernia repair for several weeks and this is reasonable. Plan: We will schedule repeat appointment in a few months for reevaluation and scheduling his recurrent left inguinal hernia repair. He is encouraged that he develops any increasing pain or swelling in the area he should notify our office and then we would expedite the situation. He understands and agrees.

## 2023-08-09 NOTE — PROGRESS NOTES
Chief Complaint: Recurrent left inguinal hernia      History of Present Illness: Patient is a 50-year-old  male who is an employee of [employer] who presents the office today with a recurrent left inguinal hernia. Patient apparently underwent left inguinal herniorrhaphy 5 years ago in the [place]. Over the past several months he has developed left groin discomfort and a "bump "in his left groin primarily when he stands. Is also aggravated with walking. He denies any GI component. He is actually 1 month status post robotic prostatectomy at [facility]. He has apparently done well from his surgery. He is a patient of Dr. Shanda Gallagher who kindly referred him to our office for evaluation of this recurrent left inguinal hernia.       Past Medical History:   Past Medical History:   Diagnosis Date   • Elevated PSA    • Hepatic steatosis    • Hepatomegaly    • High cholesterol    • Neck pain     takes robaxin prn         Past Surgical History:    Past Surgical History:   Procedure Laterality Date   • HERNIA REPAIR     • MI BX PROSTATE STRTCTC SATURATION SAMPLING IMG GID N/A 05/16/2023    Procedure: TRANSPERINEAL MRI FUSION BIOPSY PROSTATE;  Surgeon: Harles Lennox, MD;  [facility];  Service: Urology   • MI LAPS SURG [id number] RPBIC RAD W/NRV SPARING ROBOT N/A 07/10/2023    Procedure: ROBOTIC RADICAL PROSTATECTOMY, PELVIC LYMPH NODE DISSECTION;  Surgeon: Lucita Ramirez MD;  [facility];  Service: Urology   • PROSTATE BIOPSY  08/2021    benign   • PROSTATE SURGERY     • VARICOSE VEIN SURGERY           Allergies:  No Known Allergies      Medications:    Current Outpatient Medications:   •  acetaminophen (TYLENOL) 500 mg tablet, Take 1,000 mg by mouth every 6 (six) hours as needed for mild pain, Disp: , Rfl:   •  miconazole 2 % cream, , Disp: , Rfl:   •  omeprazole (PriLOSEC) 20 mg delayed release capsule, Take 1 capsule (20 mg total) by mouth daily as needed (acid reflux), Disp: 30 capsule, Rfl: 0  •  terbinafine (LamISIL) 250 mg tablet, , Disp: , Rfl:   •  triamcinolone (KENALOG) 0.1 % ointment, , Disp: , Rfl:   •  docusate sodium (COLACE) 100 mg capsule, Take 1 capsule (100 mg total) by mouth 2 (two) times a day for 14 days, Disp: 28 capsule, Rfl: 0  •  senna (SENOKOT) 8.6 mg, Take 1 tablet (8.6 mg total) by mouth daily at bedtime for 14 days (Patient not taking: Reported on 8/8/2023), Disp: 14 tablet, Rfl: 0  •  tadalafil (CIALIS) 20 MG tablet, Take 1 tablet (20 mg total) by mouth daily as needed for erectile dysfunction (Patient not taking: Reported on 8/8/2023), Disp: 10 tablet, Rfl: 5      Social History:  Social History     Social History     Substance and Sexual Activity   Alcohol Use Yes    Comment: socially     Social History     Substance and Sexual Activity   Drug Use Never     Social History     Tobacco Use   Smoking Status Never   Smokeless Tobacco Never         Family History:    Family History   Problem Relation Age of Onset   • Cancer Mother          Review of Systems:    As per the HPI. He does admit to some urinary incontinence primarily when he moves around. No weight loss weight gain fever chills night sweats chest pain nausea vomit diarrhea constipation shortness of breath headaches blurry vision double vision sore throat chronic cough etc.    Vitals:  Vitals:    08/08/23 1311   BP: 118/68   Pulse: 67   Temp: 98 °F (36.7 °C)   SpO2: 97%       Physical Exam:  Healthy-appearing adult  male 5 foot 2 inches under 47 pounds. He is awake alert no distress. Vital signs as above    Abdomen: There is a visible bulge in his left groin. He has laparoscopic incisions throughout the abdomen and periumbilical area. The right groin demonstrates no evidence of a hernia. Left groin shows a well-healed scar and a large hernia that is reducible primarily when the patient is a supine position. He is no other abdominal masses or hernias noted.       Lab Results: I have personally reviewed pertinent reports. See below. Imaging: I have personally reviewed pertinent CT scan of the abdomen and pelvis  EKG, Pathology, and Other Studies: I have personally reviewed pertinent reports. No visits with results within 1 Day(s) from this visit. Latest known visit with results is:   Office Visit on 07/28/2023   Component Date Value   • POST-VOID RESIDUAL VOLUM* 07/28/2023 0          Impression:  Recurrent left inguinal hernia and patient status post robotic prostatectomy. He is due to return to work next week after his robotic prostatectomy. Repair of his hernia would further delay return to work. He would like to defer hernia repair for several weeks and this is reasonable. Plan: We will schedule repeat appointment in a few months for reevaluation and scheduling his recurrent left inguinal hernia repair. He is encouraged that he develops any increasing pain or swelling in the area he should notify our office and then we would expedite the situation. He understands and agrees.

## 2023-08-16 ENCOUNTER — PROCEDURE VISIT (OUTPATIENT)
Dept: UROLOGY | Facility: CLINIC | Age: 58
End: 2023-08-16

## 2023-08-16 VITALS
SYSTOLIC BLOOD PRESSURE: 110 MMHG | BODY MASS INDEX: 27.23 KG/M2 | HEART RATE: 75 BPM | DIASTOLIC BLOOD PRESSURE: 70 MMHG | HEIGHT: 62 IN | OXYGEN SATURATION: 98 % | WEIGHT: 148 LBS

## 2023-08-16 DIAGNOSIS — N52.31 ERECTILE DYSFUNCTION AFTER RADICAL PROSTATECTOMY: Primary | ICD-10-CM

## 2023-08-16 PROCEDURE — 99024 POSTOP FOLLOW-UP VISIT: CPT | Performed by: PHYSICIAN ASSISTANT

## 2023-08-17 NOTE — PROGRESS NOTES
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 Information packet given on injections. The patient and his partner were instructed on preparation of the skin injection site drawing up medication and verbalized understanding. A test sample of 0.3 cc of the prescribed combination was given with brisk results. He was instructed to purchase Sudafed on their way home. ER precautions. We will follow-up in the office as scheduled in October.

## 2023-09-23 ENCOUNTER — APPOINTMENT (OUTPATIENT)
Dept: LAB | Facility: CLINIC | Age: 58
End: 2023-09-23
Payer: COMMERCIAL

## 2023-09-23 DIAGNOSIS — C61 PROSTATE CANCER (HCC): ICD-10-CM

## 2023-09-23 LAB — PSA SERPL-MCNC: <0.01 NG/ML (ref 0–4)

## 2023-09-23 PROCEDURE — 36415 COLL VENOUS BLD VENIPUNCTURE: CPT

## 2023-09-23 PROCEDURE — 84153 ASSAY OF PSA TOTAL: CPT

## 2023-09-25 ENCOUNTER — TELEPHONE (OUTPATIENT)
Dept: UROLOGY | Facility: CLINIC | Age: 58
End: 2023-09-25

## 2023-09-25 NOTE — TELEPHONE ENCOUNTER
I called the patient discussed his PSA which is undetectable. He is leaking 1 pad per day but is very active at work so this is not unexpected. He has tried Trimix at home a few times without the success he had in the clinic although of note when he had erection after clinic visit it did last for multiple hours. I called in a refill of Trimix to [facility] and left them a message.

## 2023-10-04 ENCOUNTER — OFFICE VISIT (OUTPATIENT)
Dept: FAMILY MEDICINE CLINIC | Facility: CLINIC | Age: 58
End: 2023-10-04
Payer: COMMERCIAL

## 2023-10-04 VITALS
WEIGHT: 146 LBS | HEART RATE: 80 BPM | OXYGEN SATURATION: 98 % | RESPIRATION RATE: 16 BRPM | BODY MASS INDEX: 26.87 KG/M2 | HEIGHT: 62 IN | SYSTOLIC BLOOD PRESSURE: 110 MMHG | TEMPERATURE: 98 F | DIASTOLIC BLOOD PRESSURE: 70 MMHG

## 2023-10-04 DIAGNOSIS — C61 PROSTATE CANCER (HCC): ICD-10-CM

## 2023-10-04 DIAGNOSIS — Z23 NEEDS FLU SHOT: ICD-10-CM

## 2023-10-04 DIAGNOSIS — N52.31 ERECTILE DYSFUNCTION AFTER RADICAL PROSTATECTOMY: Primary | ICD-10-CM

## 2023-10-04 PROCEDURE — 99214 OFFICE O/P EST MOD 30 MIN: CPT | Performed by: FAMILY MEDICINE

## 2023-10-04 PROCEDURE — 90471 IMMUNIZATION ADMIN: CPT | Performed by: FAMILY MEDICINE

## 2023-10-04 PROCEDURE — 90686 IIV4 VACC NO PRSV 0.5 ML IM: CPT | Performed by: FAMILY MEDICINE

## 2023-10-04 NOTE — PROGRESS NOTES
Mel Fleming      : 1965      MRN: [id number]  Encounter Provider: Sherryle Edouard, MD  Encounter Date: 10/4/2023   Encounter department: [address] [place] [address]     1. Erectile dysfunction after radical prostatectomy    2. Prostate cancer ([address])    3. Needs flu shot  -     influenza vaccine, quadrivalent, 0.5 mL, preservative-free, for adult and pediatric patients 6 mos+ (AFLURIA, FLUARIX, FLULAVAL, FLUZONE)    4. BMI 26.0-26.9,adult      Subjective:   Joanna Deng, a 45-year-old male with a past medical history of GERD, hepatic steatosis, prostate cancer (2023), erectile dysfunction, and stress incontinence, presents for a follow-up visit. He reports that his prostate cancer has been successfully treated and psa  is currently undetectable. He is scheduled for follow-up every six months. He is experiencing erectile dysfunction and has been using trimix injections for treatment, which he reports are effective but cause prolonged erections lasting up to 3-4 hours. He also reports urinary incontinence and a curvature of the penis. He has been using a pump for treatment, but is unsure if it is the correct one. He has not tried oral medications for erectile dysfunction since his surgery. He is planning to see Urologist in 2 weeks. Objective:     /70 (BP Location: Left arm, Patient Position: Sitting, Cuff Size: Standard)   Pulse 80   Temp 98 °F (36.7 °C) (Tympanic)   Resp 16   Ht 5' 2" (1.575 m)   Wt 66.2 kg (146 lb)   SpO2 98%   BMI 26.70 kg/m²         Assessment:   1. Prostate cancer - in remission  2. Erectile dysfunction - ongoing, currently managed with trimix generic from a local pharmacy. 3. Stress incontinence - ongoing  4. Penile curvature - new complaint, possibly Peyronie's disease    Plan:   1. Continue current treatment for erectile dysfunction.  Encourage patient to try oral  Patient may use a phosphodiesterase type 5 inhibitor medications as well. 2. Refer patient to urologist for further evaluation of penile curvature and possible Peyronie's disease. 3. Discuss the possibility of a penile prosthesis if current treatments for erectile dysfunction become ineffective. 4. Encourage patient to continue using the pump and to discuss with urologist if it is the correct one.  5. Schedule follow-up visit in 3 months to reassess symptoms and treatment effectiveness.

## 2023-10-27 ENCOUNTER — OFFICE VISIT (OUTPATIENT)
Dept: UROLOGY | Facility: AMBULATORY SURGERY CENTER | Age: 58
End: 2023-10-27
Payer: COMMERCIAL

## 2023-10-27 VITALS
RESPIRATION RATE: 18 BRPM | HEART RATE: 67 BPM | BODY MASS INDEX: 26.87 KG/M2 | OXYGEN SATURATION: 98 % | WEIGHT: 146 LBS | DIASTOLIC BLOOD PRESSURE: 78 MMHG | SYSTOLIC BLOOD PRESSURE: 140 MMHG | HEIGHT: 62 IN

## 2023-10-27 DIAGNOSIS — N39.3 STRESS INCONTINENCE: ICD-10-CM

## 2023-10-27 DIAGNOSIS — N52.31 ERECTILE DYSFUNCTION AFTER RADICAL PROSTATECTOMY: ICD-10-CM

## 2023-10-27 DIAGNOSIS — C61 PROSTATE CANCER (HCC): Primary | ICD-10-CM

## 2023-10-27 DIAGNOSIS — N52.9 ERECTILE DYSFUNCTION, UNSPECIFIED ERECTILE DYSFUNCTION TYPE: ICD-10-CM

## 2023-10-27 DIAGNOSIS — N99.114 POSTPROCEDURAL STRICTURE OF ANTERIOR URETHRA: ICD-10-CM

## 2023-10-27 PROCEDURE — 99215 OFFICE O/P EST HI 40 MIN: CPT | Performed by: UROLOGY

## 2023-10-27 RX ORDER — TADALAFIL 20 MG/1
20 TABLET ORAL DAILY PRN
Qty: 10 TABLET | Refills: 5 | Status: SHIPPED | OUTPATIENT
Start: 2023-10-27

## 2023-10-27 NOTE — ASSESSMENT & PLAN NOTE
The patient was appreciated to have a very tight meatus at time of his surgery creating some difficulty in passing the catheter and it appears he unfortunately has scarred down worse resulting in a slower stream.  He dilated this area in clinic today and I think he will need to self calibrate over time so provided cone-tip dilator instructions and how to do this.

## 2023-10-27 NOTE — ASSESSMENT & PLAN NOTE
The patient appears to have minimal stress incontinence. This may be more significant after dilating stricture disease.

## 2023-10-27 NOTE — LETTER
October 27, 2023     Patient: Lanre Saha  YOB: 1965  Date of Visit: 10/27/2023      To Whom it May Concern:    Lanre Saha is under my professional care. Magnus Benson was seen in my office on 10/27/2023. Magnus Benson is healing well overall from major surgery. He can lift up to 50 lbs. After Dec 31 2023 he can return to full lifting without restrictions    If you have any questions or concerns, please don't hesitate to call.          Sincerely,          Milla Manzano MD        CC: No Recipients

## 2023-10-27 NOTE — PROGRESS NOTES
Assessment/Plan:    Prostate cancer [facility])  Patient is doing well with undetectable PSA. Recheck PSA in 3 months    Erectile dysfunction after radical prostatectomy  We performed an aggressive left-sided N/V vent bundle sparing. He is doing well with Trimix. Vacuum erection device he is using is not working well in terms of the band so we will provide him information on a different company. He asked about trying Cialis which is unlikely to work at this point but reasonable to try    Stress incontinence  The patient appears to have minimal stress incontinence. This may be more significant after dilating stricture disease. Postprocedural stricture of anterior urethra  The patient was appreciated to have a very tight meatus at time of his surgery creating some difficulty in passing the catheter and it appears he unfortunately has scarred down worse resulting in a slower stream.  He dilated this area in clinic today and I think he will need to self calibrate over time so provided cone-tip dilator instructions and how to do this. Subjective:      Patient ID: Olinda Carlton is a 63 y.o. male. HPI  Olinda Carlton is a 63 y.o. old with prostate cancer status post robotic prostatectomy in July 2023     He states he previously received medical care in [facility]. His PSA is historically elevated ([facility]). He had a negative biopsy at [address] [place] [address] in 2021. The patient had an MRI in March 2023 showing a PI-RADS 5 lesion on the right anterior mid  prompting fusion guided biopsy which showed Danny grade group 1 and 2 prostate cancer including in the region of interest and other cores on the right side. No cancer seen on the left side. The patient elected for robotic prostatectomy with aggressive left neurovascular bundle sparing. Path shows GG2 N0/7 R0 with bladder neck margin negative. He was found to have a tight urethral meatus during surgery.      He is overall doing well.  No leakage but has a weak stream associated with twisting of his penis during voids and today was found to have a distal urethral stricture which was dilated in the clinic. He has been using Trimix with resulting erections but having difficulty with the vacuum erection device. Prior to surgery the patient was prescribed Tadalafil PRN for sexual activity however he did not use this regularly. Of note the patient had concern regarding recurrent hernia on the left side which was confirmed on CT scan and case discussed with general surgery who recommended moving forward with prostatectomy and then addressing hernia at a later date. During surgery hernia sac was pulled out of canal.  He has subsequently met with general surgery who plan to address hernia at a later date when the patient has time to take off from work again     Past Surgical History:   Procedure Laterality Date    HERNIA REPAIR      NH Sarah Ling IMG GID N/A 05/16/2023    Procedure: TRANSPERINEAL MRI FUSION BIOPSY PROSTATE;  Surgeon: Renetta Garay MD;  [facility];  Service: Urology    NH LAPS SURG [id number] RPBIC RAD W/NRV SPARING ROBOT N/A 07/10/2023    Procedure: ROBOTIC RADICAL PROSTATECTOMY, PELVIC LYMPH NODE DISSECTION;  Surgeon: Cristóbal Spears MD;  [facility];  Service: Urology    PROSTATE BIOPSY  08/2021    benign    PROSTATE SURGERY      VARICOSE VEIN SURGERY          Past Medical History:   Diagnosis Date    Elevated PSA     Hepatic steatosis     Hepatomegaly     High cholesterol     Neck pain     takes robaxin prn             Review of Systems   Constitutional:  Negative for chills and fever. HENT:  Negative for ear pain and sore throat. Eyes:  Negative for pain and visual disturbance. Respiratory:  Negative for cough and shortness of breath. Cardiovascular:  Negative for chest pain and palpitations. Gastrointestinal:  Negative for abdominal pain and vomiting. Genitourinary:  Negative for dysuria and hematuria. Musculoskeletal:  Negative for arthralgias and back pain. Skin:  Negative for color change and rash. Neurological:  Negative for seizures and syncope. All other systems reviewed and are negative. Objective:      /78 (BP Location: Left arm, Patient Position: Sitting, Cuff Size: Adult)   Pulse 67   Resp 18   Ht 5' 2" (1.575 m)   Wt 66.2 kg (146 lb)   SpO2 98%   BMI 26.70 kg/m²     Lab Results   Component Value Date    PSA <0.01 09/23/2023    PSA 11.5 (H) 03/06/2023    PSA 14.5 (H) 10/29/2022          Physical Exam  Constitutional:       General: He is not in acute distress. Appearance: Normal appearance. He is not toxic-appearing. HENT:      Head: Normocephalic and atraumatic. Eyes:      Extraocular Movements: Extraocular movements intact. Conjunctiva/sclera: Conjunctivae normal.      Pupils: Pupils are equal, round, and reactive to light. Cardiovascular:      Rate and Rhythm: Normal rate. Pulses: Normal pulses. Pulmonary:      Effort: Pulmonary effort is normal.   Abdominal:      General: Abdomen is flat. Palpations: Abdomen is soft. Tenderness: There is no abdominal tenderness. There is no right CVA tenderness, left CVA tenderness, guarding or rebound. Genitourinary:     Penis: Normal.       Comments: The patient's urethra was probed with a cone-tip dilator and resistance felt approximately 1 cm in which was gently pushed open with a cone-tip dilator. Stricture suspected to be approximately 1 cm in length. I showed the patient and his wife how to perform dilation at home and provided count of dilator and surgical lubrication    We also attempted to use his vacuum erection device which he brought in from home. Able to create a normal erection but the band with the kit did not maintain erection when the device was removed. Skin:     General: Skin is warm and dry.    Neurological:      Mental Status: He is alert and oriented to person, place, and time. Mental status is at baseline. Psychiatric:         Mood and Affect: Mood normal.         Behavior: Behavior normal.         Thought Content:  Thought content normal.         Judgment: Judgment normal.           Orders  Orders Placed This Encounter   Procedures    PSA Total, Diagnostic     Standing Status:   Future     Standing Expiration Date:   10/27/2024

## 2023-10-27 NOTE — ASSESSMENT & PLAN NOTE
We performed an aggressive left-sided N/V vent bundle sparing. He is doing well with Trimix. Vacuum erection device he is using is not working well in terms of the band so we will provide him information on a different company.     He asked about trying Cialis which is unlikely to work at this point but reasonable to try

## 2023-10-27 NOTE — LETTER
October 27, 2023     Patient: Lanny Jurado  YOB: 1965  Date of Visit: 10/27/2023      To Whom it May Concern:    Lanny Jurado is under my professional care. Anthony Walker was seen in my office on 10/27/2023. Anthony Walker is overall recovering well from surgery. He should try to avoid lifting greater than 25lbs until Dec 15th 2023    If you have any questions or concerns, please don't hesitate to call.          Sincerely,          Isai Worley MD        CC: No Recipients

## 2023-11-09 ENCOUNTER — HOSPITAL ENCOUNTER (EMERGENCY)
Facility: HOSPITAL | Age: 58
Discharge: HOME | End: 2023-11-09
Attending: EMERGENCY MEDICINE
Payer: COMMERCIAL

## 2023-11-09 VITALS
RESPIRATION RATE: 18 BRPM | TEMPERATURE: 97.5 F | SYSTOLIC BLOOD PRESSURE: 118 MMHG | OXYGEN SATURATION: 95 % | HEART RATE: 70 BPM | DIASTOLIC BLOOD PRESSURE: 72 MMHG

## 2023-11-09 DIAGNOSIS — J02.9 PHARYNGITIS: ICD-10-CM

## 2023-11-09 DIAGNOSIS — K29.70 GASTRITIS: Primary | ICD-10-CM

## 2023-11-09 LAB
FLUAV RNA RESP QL NAA+PROBE: NEGATIVE
FLUBV RNA RESP QL NAA+PROBE: NEGATIVE
RSV RNA RESP QL NAA+PROBE: NEGATIVE
SARS-COV-2 RNA RESP QL NAA+PROBE: NEGATIVE

## 2023-11-09 PROCEDURE — 0241U HB NFCT DS VIR RESP RNA 4 TRGT: CPT

## 2023-11-09 PROCEDURE — 99284 EMERGENCY DEPT VISIT MOD MDM: CPT | Performed by: EMERGENCY MEDICINE

## 2023-11-09 PROCEDURE — 99283 EMERGENCY DEPT VISIT LOW MDM: CPT

## 2023-11-09 RX ORDER — SUCRALFATE 1 G/1
1 TABLET ORAL 3 TIMES DAILY
Qty: 21 TABLET | Refills: 0 | Status: SHIPPED | OUTPATIENT
Start: 2023-11-09 | End: 2023-11-16

## 2023-11-09 RX ORDER — FAMOTIDINE 20 MG/1
20 TABLET, FILM COATED ORAL 2 TIMES DAILY
Qty: 14 TABLET | Refills: 0 | Status: SHIPPED | OUTPATIENT
Start: 2023-11-09 | End: 2023-11-16

## 2023-11-09 RX ORDER — SUCRALFATE 1 G/1
1 TABLET ORAL ONCE
Status: COMPLETED | OUTPATIENT
Start: 2023-11-09 | End: 2023-11-09

## 2023-11-09 RX ORDER — ACETAMINOPHEN 325 MG/1
975 TABLET ORAL ONCE
Status: COMPLETED | OUTPATIENT
Start: 2023-11-09 | End: 2023-11-09

## 2023-11-09 RX ADMIN — ACETAMINOPHEN 975 MG: 325 TABLET, FILM COATED ORAL at 14:58

## 2023-11-09 RX ADMIN — SUCRALFATE 1 G: 1 TABLET ORAL at 14:58

## 2023-11-09 NOTE — DISCHARGE INSTRUCTIONS
You were evaluated in the Emergency Department today for epigastric pain, which is most likely due to irritation of the lining of your stomach. Avoid spicy or acidic foods. I wrote you a prescription for carafate and pepcid. This is at your pharmacy. Please take as prescribed. Please follow up with your primary care physician within two days. Return to the Emergency Department if you experience shortness of breath, worsening or uncontrolled abdominal or chest pain, headache, light headedness, feeling faint, nausea, vomiting, bloody vomit or stools, black tarry stools, or any other concerning symptoms. Thank you for choosing us for your care.

## 2023-11-09 NOTE — Clinical Note
Olinda Carlton was seen and treated in our emergency department on 11/9/2023. Diagnosis:     [place]  . He may return on this date: 11/11/2023    Olinda Carlton was seen in our ED today. Please excuse Olinda Carlton for any work/school missed and allow Olinda Carlton to return on the date listed above. If he tests positive for COVID please excuse him according to institutional policies. If you have any questions or concerns, please don't hesitate to call.       Al El, DO    ______________________________           _______________          _______________  Hospital Representative                              Date                                Time

## 2023-11-09 NOTE — Clinical Note
Olena Todd was seen and treated in our emergency department on 11/9/2023. Diagnosis:     Lencho  . He may return on this date: 11/11/2023    Olena Todd was seen in our ED today. Please excuse Olena Todd for any work/school missed and allow Olena Todd to return on the date listed above. If he tests positive for COVID please excuse him according to institutional policies. If you have any questions or concerns, please don't hesitate to call.       Gigi Sanchez, DO    ______________________________           _______________          _______________  Hospital Representative                              Date                                Time

## 2023-11-09 NOTE — ED ATTENDING ATTESTATION
11/9/2023  I, Harlen Dandy, DO, saw and evaluated the patient. I have discussed the patient with the resident/non-physician practitioner and agree with the resident's/non-physician practitioner's findings, Plan of Care, and MDM as documented in the resident's/non-physician practitioner's note, except where noted. All available labs and Radiology studies were reviewed. I was present for key portions of any procedure(s) performed by the resident/non-physician practitioner and I was immediately available to provide assistance. At this point I agree with the current assessment done in the Emergency Department. I have conducted an independent evaluation of this patient a history and physical is as follows:    ED Course     62 yo male is a  here at [facility]. Been feeling generally ill and thinks he may have covid with no obvious exposures. Epigastric pain and pharyngitis. Denies any other symptoms or issues at this time. ROS: Negative unless otherwise stated above. Physical Exam:  General: VS reviewed  Appears in NAD  awake, alert. Well-nourished, well-developed. Appears stated age. Speaking normally in full sentences. Head: Normocephalic, atraumatic  Eyes: EOM-I. No diplopia. No hyphema. No subconjunctival hemorrhages. Symmetrical lids. ENT: Atraumatic external nose and ears. MMM  No malocclusion. No stridor. Normal phonation. No drooling. Normal swallowing. Neck: No JVD. CV: No pallor noted  Lungs:   No tachypnea  No respiratory distress  MSK:   FROM spontaneously  Skin: Dry, intact. Neuro: Awake, alert, GCS15, CN II-XII grossly intact. Motor grossly intact. Psychiatric/Behavioral: Appropriate mood and affect   Exam: deferred        Offered patient blood work and EKG as patient feeling generally unwell and epigastric pain, declining at this time and he would like any further work-up beyond COVID swab.   Discussed strict return precautions with patient which he states his understanding of.         Critical Care Time  Procedures

## 2023-11-09 NOTE — ED PROVIDER NOTES
History  Chief Complaint   Patient presents with    COVID-19 Exposure     Pt reports not feeling well and wants covid test     Patient is a 71-year-old male presenting for evaluation of multiple symptoms. He reports that last night he developed some pharyngitis. This morning he noticed that he was feeling some generalized myalgias, he also developed some epigastric abdominal discomfort as well as several episodes of nonbloody, nonmelanotic diarrhea. Has not had any nausea or vomiting. He denies any chest pain or difficulty breathing. He is requesting to be swabbed for COVID prior to returning home. He has no other acute complaints at this time. Prior to Admission Medications   Prescriptions Last Dose Informant Patient Reported? Taking? tadalafil (CIALIS) 20 MG tablet   No No   Sig: Take 1 tablet (20 mg total) by mouth daily as needed for erectile dysfunction      Facility-Administered Medications: None       Past Medical History:   Diagnosis Date    Elevated PSA     Hepatic steatosis     Hepatomegaly     High cholesterol     Neck pain     takes robaxin prn       Past Surgical History:   Procedure Laterality Date    HERNIA REPAIR      ID BX PROSTATE STRTCTC SATURATION SAMPLING IMG GID N/A 05/16/2023    Procedure: TRANSPERINEAL MRI FUSION BIOPSY PROSTATE;  Surgeon: Irma Hinton MD;  Location: [facility];  Service: Urology    ID LAPS SURG [id number] RPBIC RAD W/NRV SPARING ROBOT N/A 07/10/2023    Procedure: ROBOTIC RADICAL PROSTATECTOMY, PELVIC LYMPH NODE DISSECTION;  Surgeon: Brunilda Jaime MD;  Location: [facility];  Service: Urology    PROSTATE BIOPSY  08/2021    benign    PROSTATE SURGERY      VARICOSE VEIN SURGERY         Family History   Problem Relation Age of Onset    Cancer Mother      I have reviewed and agree with the history as documented.     E-Cigarette/Vaping    E-Cigarette Use Never User      E-Cigarette/Vaping Substances    Nicotine No     THC No     CBD No     Flavoring No     Other No Unknown No      Social History     Tobacco Use    Smoking status: Never    Smokeless tobacco: Never   Vaping Use    Vaping Use: Never used   Substance Use Topics    Alcohol use: Yes     Comment: socially    Drug use: Never        Review of Systems   Constitutional:  Negative for fever. HENT:  Positive for sore throat. Cardiovascular:  Negative for chest pain. Gastrointestinal:  Positive for abdominal pain and diarrhea. Negative for nausea and vomiting. Musculoskeletal:  Positive for myalgias. All other systems reviewed and are negative. Physical Exam  ED Triage Vitals   Temperature Pulse Respirations Blood Pressure SpO2   11/09/23 1428 11/09/23 1428 11/09/23 1428 11/09/23 1428 11/09/23 1428   97.5 °F (36.4 °C) 70 18 118/72 95 %      Temp Source Heart Rate Source Patient Position - Orthostatic VS BP Location FiO2 (%)   11/09/23 1428 11/09/23 1428 11/09/23 1428 11/09/23 1428 --   Temporal Monitor Sitting Left arm       Pain Score       11/09/23 1458       8             Orthostatic Vital Signs  Vitals:    11/09/23 1428   BP: 118/72   Pulse: 70   Patient Position - Orthostatic VS: Sitting       Physical Exam  Vitals and nursing note reviewed. Constitutional:       General: He is not in acute distress. Appearance: Normal appearance. He is not ill-appearing or toxic-appearing. HENT:      Head: Normocephalic and atraumatic. Right Ear: External ear normal.      Left Ear: External ear normal.      Nose: Nose normal.      Mouth/Throat:      Comments: There is no erythema or exudates. No tonsillar swelling or pus. The uvula is midline without deviation or edema. There is no soft palate swelling. Eyes:      General: No scleral icterus. Right eye: No discharge. Left eye: No discharge. Extraocular Movements: Extraocular movements intact. Conjunctiva/sclera: Conjunctivae normal.   Cardiovascular:      Rate and Rhythm: Normal rate. Heart sounds: Normal heart sounds.  No murmur heard. No friction rub. No gallop. Pulmonary:      Effort: Pulmonary effort is normal. No respiratory distress. Breath sounds: Normal breath sounds. Abdominal:      General: Abdomen is flat. There is no distension. Palpations: Abdomen is soft. There is no mass. Tenderness: There is no abdominal tenderness. Genitourinary:     Comments: Deferred  Skin:     General: Skin is warm and dry. Neurological:      General: No focal deficit present. Mental Status: He is alert. ED Medications  Medications   sucralfate (CARAFATE) tablet 1 g (1 g Oral Given 11/9/23 1458)   acetaminophen (TYLENOL) tablet 975 mg (975 mg Oral Given 11/9/23 1458)       Diagnostic Studies  Results Reviewed       Procedure Component Value Units Date/Time    FLU/RSV/COVID - if FLU/RSV clinically relevant [[id number]]  (Normal) Collected: 11/09/23 1457    Lab Status: Final result Specimen: Nares from Nose Updated: 11/09/23 1540     SARS-CoV-2 Negative     INFLUENZA A PCR Negative     INFLUENZA B PCR Negative     RSV PCR Negative    Narrative:      FOR PEDIATRIC PATIENTS - copy/paste COVID Guidelines URL to browser: [email or web address] ashx    SARS-CoV-2 assay is a Nucleic Acid Amplification assay intended for the  qualitative detection of nucleic acid from SARS-CoV-2 in nasopharyngeal  swabs. Results are for the presumptive identification of SARS-CoV-2 RNA. Positive results are indicative of infection with SARS-CoV-2, the virus  causing COVID-19, but do not rule out bacterial infection or co-infection  with other viruses. Laboratories within the [facility] and its  territories are required to report all positive results to the appropriate  public health authorities. Negative results do not preclude SARS-CoV-2  infection and should not be used as the sole basis for treatment or other  patient management decisions.  Negative results must be combined with  clinical observations, patient history, and epidemiological information. This test has not been FDA cleared or approved. This test has been authorized by FDA under an Emergency Use Authorization  (EUA). This test is only authorized for the duration of time the  declaration that circumstances exist justifying the authorization of the  emergency use of an in vitro diagnostic tests for detection of SARS-CoV-2  virus and/or diagnosis of COVID-19 infection under section 564(b)(1) of  the Act, 21 U. S.C. 457WDH-8(W)(6), unless the authorization is terminated  or revoked sooner. The test has been validated but independent review by FDA  and CLIA is pending. Test performed using [employer]: This RT-PCR assay targets N2,  a region unique to SARS-CoV-2. A conserved region in the E-gene was chosen  for pan-Sarbecovirus detection which includes SARS-CoV-2. According to CMS-2020-01-R, this platform meets the definition of high-throughput technology. No orders to display         Procedures  Procedures      ED Course                                       Medical Decision Making  Patient is a 25-year-old male presenting for evaluation of multiple symptoms. Based on history and evaluation, differential diagnosis includes but is not limited to: Viral illness, viral gastritis, GERD. Plan: I offered the patient several options for further evaluation of his symptoms. He did come to the emergency department only requesting a COVID swab. I offered him additional testing to include lab work, including a CBC, CMP, lipase. I offered him an ECG. I offered him further work-up at the discretion of the results of these initial lab/testing. Patient is declining this. He would prefer to receive symptomatic control as well as COVID swab and be discharged. He has capacity and understands the risks. Using shared decision-making, will treat patient symptomatically and send viral swab.     On reassessment, patient continues to be well-appearing. Continues to decline any further work-up. Prescriptions for Pepcid and Carafate written and sent to patient pharmacy. Stable for discharge with primary care follow-up. Patient seems understand this plan and is agreeable. All questions answered. Patient discharged home with return precautions. Risk  OTC drugs. Prescription drug management. Disposition  Final diagnoses:   Gastritis   Pharyngitis     Time reflects when diagnosis was documented in both MDM as applicable and the Disposition within this note       Time User Action Codes Description Comment    11/9/2023  2:49 PM Atif Santiago Add [K29.70] Gastritis     11/9/2023  2:50 PM Atif Santiago Add [J02.9] Pharyngitis           ED Disposition       ED Disposition   Discharge    Condition   Stable    Date/Time   Thu Nov 9, 2023  2:49 PM    Comment   Jose Hernandez discharge to home/self care. Follow-up Information    None         Discharge Medication List as of 11/9/2023  3:19 PM        START taking these medications    Details   famotidine (PEPCID) 20 mg tablet Take 1 tablet (20 mg total) by mouth 2 (two) times a day for 7 days, Starting u 11/9/2023, Until Thu 11/16/2023, Print      sucralfate (CARAFATE) 1 g tablet Take 1 tablet (1 g total) by mouth 3 (three) times a day for 7 days, Starting Thu 11/9/2023, Until Thu 11/16/2023, Print           CONTINUE these medications which have NOT CHANGED    Details   tadalafil (CIALIS) 20 MG tablet Take 1 tablet (20 mg total) by mouth daily as needed for erectile dysfunction, Starting Fri 10/27/2023, Print           No discharge procedures on file. PDMP Review         Value Time User    PDMP Reviewed  Yes 7/11/2023  3:44 PM Zachariah Feliciano PA-C             ED Provider  Attending physically available and evaluated Jose Hernandez. I managed the patient along with the ED Attending.     Electronically Signed by Vamsi Londono [facility]  11/11/23 3167

## 2024-02-07 ENCOUNTER — TELEPHONE (OUTPATIENT)
Dept: UROLOGY | Facility: CLINIC | Age: 59
End: 2024-02-07

## 2024-02-07 NOTE — TELEPHONE ENCOUNTER
CASSY 2/7/24 to inform patient of the cancellation of the 2/20/24 appointment with Eva Chaney the new appointment date is 4/19/24 at 3 pm please call the office at [phone]

## 2024-02-14 ENCOUNTER — OFFICE VISIT (OUTPATIENT)
Dept: FAMILY MEDICINE CLINIC | Facility: CLINIC | Age: 59
End: 2024-02-14
Payer: COMMERCIAL

## 2024-02-14 ENCOUNTER — HOSPITAL ENCOUNTER (OUTPATIENT)
Dept: RADIOLOGY | Facility: HOSPITAL | Age: 59
Discharge: HOME | End: 2024-02-14
Payer: COMMERCIAL

## 2024-02-14 VITALS
SYSTOLIC BLOOD PRESSURE: 106 MMHG | WEIGHT: 150 LBS | DIASTOLIC BLOOD PRESSURE: 70 MMHG | HEIGHT: 62 IN | RESPIRATION RATE: 16 BRPM | OXYGEN SATURATION: 99 % | BODY MASS INDEX: 27.6 KG/M2 | TEMPERATURE: 98 F | HEART RATE: 78 BPM

## 2024-02-14 DIAGNOSIS — M54.2 CERVICALGIA: ICD-10-CM

## 2024-02-14 DIAGNOSIS — R73.03 PRE-DIABETES: ICD-10-CM

## 2024-02-14 DIAGNOSIS — Z00.01 ENCOUNTER FOR GENERAL ADULT MEDICAL EXAMINATION WITH ABNORMAL FINDINGS: Primary | ICD-10-CM

## 2024-02-14 DIAGNOSIS — C61 PROSTATE CANCER (HCC): ICD-10-CM

## 2024-02-14 DIAGNOSIS — N52.37 ERECTILE DYSFUNCTION FOLLOWING PROSTATE ABLATIVE THERAPY: ICD-10-CM

## 2024-02-14 PROCEDURE — 99386 PREV VISIT NEW AGE 40-64: CPT | Performed by: FAMILY MEDICINE

## 2024-02-14 PROCEDURE — 72050 X-RAY EXAM NECK SPINE 4/5VWS: CPT

## 2024-02-14 PROCEDURE — 99396 PREV VISIT EST AGE 40-64: CPT | Performed by: FAMILY MEDICINE

## 2024-02-14 NOTE — PROGRESS NOTES
"Name: Lencho Mojica      : 1965      MRN: [id number]  Encounter Provider: Ankit Gonzalez MD  Encounter Date: 2024   Encounter department: [facility]    Assessment & Plan     1. Encounter for general adult medical examination with abnormal findings  -     CBC and differential; Future  -     Comprehensive metabolic panel; Future  -     Lipid Panel with Direct LDL reflex; Future    2. Pre-diabetes  -     Hemoglobin A1C; Future    3. Prostate cancer (HCC)  -     PSA Total, Diagnostic; Future    4. Cervicalgia  -     XR spine cervical complete 4 or 5 vw non injury; Future; Expected date: 2024    5. Erectile dysfunction following prostate ablative therapy     Headache and neck pain: Likely tension-type given the description; will recommend conservative management with analgesics and monitor for changes.  Offensive odor with bowel movements: Could be indicative of gastrointestinal infection or malabsorption; will await lab results and consider stool studies if symptoms persist.  Post-operative follow-up: Continue routine surveillance of surgical site and overall recovery. Inquire about recent PSA levels if applicable to the type of surgery performed.  General health maintenance: Encourage patient to maintain a balanced diet and regular physical activity, especially given the physically demanding nature of the patient's work.  Trials of Cialis 20 and 30 mg. If fails to discuss with Urology options like penis implant.    Subjective         Patient reports intermittent strong chest pain, not constant but occasionally severe. Describes a headache localized to the posterior aspect of the head and neck pain.Trimix gave him pain that lasted for days and priapism that did not resolved after many \"pills\" to resolved. He will not use it again. Also mentions an offensive odor during bowel movements, likening it to 'a dead cat.' No new complaints regarding the " "surgical site. Last surgery was in July, last seen in clinic in October.    Vital Signs  /70 (BP Location: Left arm, Patient Position: Sitting, Cuff Size: Standard)   Pulse 78   Temp 98 °F (36.7 °C) (Tympanic)   Resp 16   Ht 5' 2\" (1.575 m)   Wt 68 kg (150 lb)   SpO2 99%   BMI 27.44 kg/m²       Physical Exam  He looks in non distress; oreinted Person, Place, and Day, HEENT unremarkable.  Lungs are clear. Heart is having Normal rhythm w/o murmurs.  Abdomen is soft, non tender. MS: Muscle strength and tone were normal.  Neurological system has no deficit and walks normal.      Lab Results  Last PSA was in 10/2023 <0.1           Additional Notes:  Patient speaks Samoan; used  services for communication.      Ankit Gonzalez MD   [facility] PRIMARY CARE [facility]      "

## 2024-02-19 ENCOUNTER — LAB (OUTPATIENT)
Dept: LAB | Facility: HOSPITAL | Age: 59
End: 2024-02-19
Payer: COMMERCIAL

## 2024-02-19 DIAGNOSIS — R73.03 PRE-DIABETES: ICD-10-CM

## 2024-02-19 DIAGNOSIS — C61 PROSTATE CANCER (HCC): ICD-10-CM

## 2024-02-19 DIAGNOSIS — Z00.01 ENCOUNTER FOR GENERAL ADULT MEDICAL EXAMINATION WITH ABNORMAL FINDINGS: ICD-10-CM

## 2024-02-19 LAB
ALBUMIN SERPL BCP-MCNC: 4.2 G/DL (ref 3.5–5)
ALP SERPL-CCNC: 60 U/L (ref 34–104)
ALT SERPL W P-5'-P-CCNC: 27 U/L (ref 7–52)
ANION GAP SERPL CALCULATED.3IONS-SCNC: 7 MMOL/L
AST SERPL W P-5'-P-CCNC: 18 U/L (ref 13–39)
BASOPHILS # BLD AUTO: 0.04 THOUSANDS/ÂΜL (ref 0–0.1)
BASOPHILS NFR BLD AUTO: 1 % (ref 0–1)
BILIRUB SERPL-MCNC: 1.11 MG/DL (ref 0.2–1)
BUN SERPL-MCNC: 17 MG/DL (ref 5–25)
CALCIUM SERPL-MCNC: 9.2 MG/DL (ref 8.4–10.2)
CHLORIDE SERPL-SCNC: 107 MMOL/L (ref 96–108)
CHOLEST SERPL-MCNC: 161 MG/DL
CO2 SERPL-SCNC: 27 MMOL/L (ref 21–32)
CREAT SERPL-MCNC: 0.69 MG/DL (ref 0.6–1.3)
EOSINOPHIL # BLD AUTO: 0.45 THOUSAND/ÂΜL (ref 0–0.61)
EOSINOPHIL NFR BLD AUTO: 6 % (ref 0–6)
ERYTHROCYTE [DISTWIDTH] IN BLOOD BY AUTOMATED COUNT: 12.7 % (ref 11.6–15.1)
EST. AVERAGE GLUCOSE BLD GHB EST-MCNC: 114 MG/DL
GFR SERPL CREATININE-BSD FRML MDRD: 105 ML/MIN/1.73SQ M
GLUCOSE P FAST SERPL-MCNC: 96 MG/DL (ref 65–99)
HBA1C MFR BLD: 5.6 %
HCT VFR BLD AUTO: 45.5 % (ref 36.5–49.3)
HDLC SERPL-MCNC: 38 MG/DL
HGB BLD-MCNC: 15.3 G/DL (ref 12–17)
IMM GRANULOCYTES # BLD AUTO: 0.05 THOUSAND/UL (ref 0–0.2)
IMM GRANULOCYTES NFR BLD AUTO: 1 % (ref 0–2)
LDLC SERPL CALC-MCNC: 76 MG/DL (ref 0–100)
LYMPHOCYTES # BLD AUTO: 2.58 THOUSANDS/ÂΜL (ref 0.6–4.47)
LYMPHOCYTES NFR BLD AUTO: 32 % (ref 14–44)
MCH RBC QN AUTO: 30.2 PG (ref 26.8–34.3)
MCHC RBC AUTO-ENTMCNC: 33.6 G/DL (ref 31.4–37.4)
MCV RBC AUTO: 90 FL (ref 82–98)
MONOCYTES # BLD AUTO: 0.48 THOUSAND/ÂΜL (ref 0.17–1.22)
MONOCYTES NFR BLD AUTO: 6 % (ref 4–12)
NEUTROPHILS # BLD AUTO: 4.46 THOUSANDS/ÂΜL (ref 1.85–7.62)
NEUTS SEG NFR BLD AUTO: 54 % (ref 43–75)
NRBC BLD AUTO-RTO: 0 /100 WBCS
PLATELET # BLD AUTO: 276 THOUSANDS/UL (ref 149–390)
PMV BLD AUTO: 10.4 FL (ref 8.9–12.7)
POTASSIUM SERPL-SCNC: 4.2 MMOL/L (ref 3.5–5.3)
PROT SERPL-MCNC: 6.7 G/DL (ref 6.4–8.4)
PSA SERPL-MCNC: <0.01 NG/ML (ref 0–4)
RBC # BLD AUTO: 5.07 MILLION/UL (ref 3.88–5.62)
SODIUM SERPL-SCNC: 141 MMOL/L (ref 135–147)
TRIGL SERPL-MCNC: 235 MG/DL
WBC # BLD AUTO: 8.06 THOUSAND/UL (ref 4.31–10.16)

## 2024-02-19 PROCEDURE — 83036 HEMOGLOBIN GLYCOSYLATED A1C: CPT

## 2024-02-19 PROCEDURE — 36415 COLL VENOUS BLD VENIPUNCTURE: CPT

## 2024-02-19 PROCEDURE — 85025 COMPLETE CBC W/AUTO DIFF WBC: CPT

## 2024-02-19 PROCEDURE — 80061 LIPID PANEL: CPT

## 2024-02-19 PROCEDURE — 84153 ASSAY OF PSA TOTAL: CPT

## 2024-02-19 PROCEDURE — 80053 COMPREHEN METABOLIC PANEL: CPT

## 2024-02-21 NOTE — RESULT ENCOUNTER NOTE
Please call patient: Labs reported mildly elevation of triglycerides and decrease of HDL cholesterol, the good cholesterol.  The goal is to decrease triglycerides and to increase HDL cholesterol.  The only recommendation for the is exercise.  Rest of the labs are in normal limits.

## 2024-02-23 ENCOUNTER — TELEPHONE (OUTPATIENT)
Age: 59
End: 2024-02-23

## 2024-02-23 NOTE — TELEPHONE ENCOUNTER
Received call from patient to review lab results.  Gave him the following information per Dr. Gonzalez:  Labs reported mildly elevation of triglycerides and decrease of HDL cholesterol, the good cholesterol.  The goal is to decrease triglycerides and to increase HDL cholesterol.  The only recommendation for the is exercise.  Rest of the labs are in normal limits.     No further questions offered.

## 2024-03-03 NOTE — RESULT ENCOUNTER NOTE
Please call patient about Cervical spine X ray has degenerative changes/arthritis. To take tylenol, if not better call for referral to physical therapy.

## 2024-03-13 ENCOUNTER — TELEPHONE (OUTPATIENT)
Dept: UROLOGY | Facility: CLINIC | Age: 59
End: 2024-03-13

## 2024-03-13 NOTE — TELEPHONE ENCOUNTER
CASSY 3/13/24 to inform patient that his 4/19/24 appointment with has been canceled the new date and time is 5/30/24 at 2 pm please call the office at [phone] to confirm

## 2024-03-13 NOTE — TELEPHONE ENCOUNTER
Patient is requesting a call back to inform him why his appointments keep being rescheduled.    Pt is not very happy about this.    Call back [phone]

## 2024-04-04 ENCOUNTER — OFFICE VISIT (OUTPATIENT)
Dept: UROLOGY | Facility: CLINIC | Age: 59
End: 2024-04-04
Payer: COMMERCIAL

## 2024-04-04 VITALS
HEART RATE: 65 BPM | WEIGHT: 149 LBS | BODY MASS INDEX: 27.42 KG/M2 | OXYGEN SATURATION: 96 % | HEIGHT: 62 IN | SYSTOLIC BLOOD PRESSURE: 110 MMHG | DIASTOLIC BLOOD PRESSURE: 60 MMHG

## 2024-04-04 DIAGNOSIS — C61 PROSTATE CANCER (HCC): ICD-10-CM

## 2024-04-04 DIAGNOSIS — N52.31 ERECTILE DYSFUNCTION AFTER RADICAL PROSTATECTOMY: Primary | ICD-10-CM

## 2024-04-04 PROCEDURE — 99213 OFFICE O/P EST LOW 20 MIN: CPT | Performed by: PHYSICIAN ASSISTANT

## 2024-04-04 RX ORDER — SILDENAFIL 100 MG/1
100 TABLET, FILM COATED ORAL DAILY PRN
Qty: 15 TABLET | Refills: 3 | Status: SHIPPED | OUTPATIENT
Start: 2024-04-04

## 2024-04-04 RX ORDER — TADALAFIL 20 MG/1
20 TABLET ORAL DAILY PRN
Qty: 15 TABLET | Refills: 3 | Status: SHIPPED | OUTPATIENT
Start: 2024-04-04

## 2024-04-04 NOTE — PROGRESS NOTES
"  UROLOGY PROGRESS NOTE   Patient Identifiers: Lencho Mojica (MRN [id number])  Date of Service: 4/4/2024    Subjective:   58-year-old man history of Danny 7 stage II prostate cancer.  He had a robotic prostatectomy in July 2023.  PSA is undetectable<0.01.  He has good urinary control.  Last August I did Trimix teaching with him.  He states he has had 3-hour erection with pain for several days after.  Already with 0.2 cc and has decreased it somewhat from there.    Reason for visit: Prostate cancer follow-up    Objective:     VITALS:    Vitals:    04/04/24 1125   BP: 110/60   Pulse: 65   SpO2: 96%           LABS:  Lab Results   Component Value Date    HGB 15.3 02/19/2024    HCT 45.5 02/19/2024    WBC 8.06 02/19/2024     02/19/2024   ]    Lab Results   Component Value Date    K 4.2 02/19/2024     02/19/2024    CO2 27 02/19/2024    BUN 17 02/19/2024    CREATININE 0.69 02/19/2024    CALCIUM 9.2 02/19/2024   ]        INPATIENT MEDS:    Current Outpatient Medications:     sildenafil (VIAGRA) 100 mg tablet, Take 1 tablet (100 mg total) by mouth daily as needed for erectile dysfunction, Disp: 15 tablet, Rfl: 3    tadalafil (CIALIS) 20 MG tablet, Take 1 tablet (20 mg total) by mouth daily as needed for erectile dysfunction, Disp: 15 tablet, Rfl: 3      Physical Exam:   /60 (BP Location: Left arm, Patient Position: Sitting, Cuff Size: Standard)   Pulse 65   Ht 5' 2\" (1.575 m)   Wt 67.6 kg (149 lb)   SpO2 96%   BMI 27.25 kg/m²   GEN: no acute distress    RESP: breathing comfortably with no accessory muscle use    ABD: soft, non-tender, non-distended   INCISION:    EXT: no significant peripheral edema       RADIOLOGY:   None    Assessment:   #1.  Prostate cancer  #2.  Erectile dysfunction after radical prostatectomy    Plan:   -Follow-up in 4 months with PSA prior to visit  -We talked about decreasing the concentration of the Trimix or even going to by mix  -I gave him prescriptions for Cialis " and Viagra with instructions on use  -

## 2024-04-10 ENCOUNTER — APPOINTMENT (OUTPATIENT)
Dept: LAB | Facility: CLINIC | Age: 59
End: 2024-04-10

## 2024-04-10 DIAGNOSIS — Z00.8 HEALTH EXAMINATION IN POPULATION SURVEY: ICD-10-CM

## 2024-04-10 LAB
CHOLEST SERPL-MCNC: 158 MG/DL
EST. AVERAGE GLUCOSE BLD GHB EST-MCNC: 114 MG/DL
HBA1C MFR BLD: 5.6 %
HDLC SERPL-MCNC: 35 MG/DL
LDLC SERPL CALC-MCNC: 82 MG/DL (ref 0–100)
NONHDLC SERPL-MCNC: 123 MG/DL
TRIGL SERPL-MCNC: 207 MG/DL

## 2024-04-10 PROCEDURE — 80061 LIPID PANEL: CPT

## 2024-04-10 PROCEDURE — 83036 HEMOGLOBIN GLYCOSYLATED A1C: CPT

## 2024-04-10 PROCEDURE — 36415 COLL VENOUS BLD VENIPUNCTURE: CPT

## 2024-08-19 ENCOUNTER — TELEPHONE (OUTPATIENT)
Dept: UROLOGY | Facility: CLINIC | Age: 59
End: 2024-08-19

## 2024-08-19 NOTE — TELEPHONE ENCOUNTER
Spoke to patient regarding obtaining his psa lab work prior to his upcoming appointment on 8/22/24. Patient will be obtaining prior to appointment.

## 2024-10-09 NOTE — Clinical Note
"Jeanette Flores was seen and treated in our emergency department on 1/13/2022  Diagnosis: Here for COVID test    Lencho    He may return on this date: 01/13/2022    We did do a COVID test today  Testing (by PCR) for a negative COVID is pointless  The test can remain positive for up to 90 days and does NOT indicate active infection  This test might be negative or positive; but if patient is asymptomatic, it means nothing  It is also not required by the CDC  An outpatient test is more accurate for representing an active infection  If you have any questions or concerns, please don't hesitate to call        Jasmin Loco MD    ______________________________           _______________          _______________  Hospital Representative                              Date                                Time Maggie Ron, PT

## 2024-10-15 ENCOUNTER — IMMUNIZATIONS (OUTPATIENT)
Dept: FAMILY MEDICINE CLINIC | Facility: CLINIC | Age: 59
End: 2024-10-15
Payer: COMMERCIAL

## 2024-10-15 DIAGNOSIS — Z23 NEED FOR IMMUNIZATION AGAINST INFLUENZA: Primary | ICD-10-CM

## 2024-10-15 PROCEDURE — 90471 IMMUNIZATION ADMIN: CPT

## 2024-10-15 PROCEDURE — 90673 RIV3 VACCINE NO PRESERV IM: CPT

## 2024-11-27 ENCOUNTER — OFFICE VISIT (OUTPATIENT)
Dept: SURGERY | Facility: CLINIC | Age: 59
End: 2024-11-27
Payer: COMMERCIAL

## 2024-11-27 VITALS
TEMPERATURE: 98.4 F | HEIGHT: 62 IN | DIASTOLIC BLOOD PRESSURE: 72 MMHG | BODY MASS INDEX: 27.79 KG/M2 | WEIGHT: 151 LBS | HEART RATE: 80 BPM | OXYGEN SATURATION: 96 % | SYSTOLIC BLOOD PRESSURE: 108 MMHG

## 2024-11-27 DIAGNOSIS — K40.91 UNILATERAL RECURRENT INGUINAL HERNIA WITHOUT OBSTRUCTION OR GANGRENE: Primary | ICD-10-CM

## 2024-11-27 PROCEDURE — 99214 OFFICE O/P EST MOD 30 MIN: CPT | Performed by: SPECIALIST

## 2024-11-27 NOTE — LETTER
"November 27, 2024     Ankit Gonzalez MD  [address] [place] [id number]    Patient: Lencho Mojica   YOB: 1965   Date of Visit: 11/27/2024       Dear Ankit,    Thank you for referring Lencho Mojica to me for evaluation. Below are the relevant portions of my H&P.    If you have questions, please do not hesitate to call me. I look forward to following Lencho along with you.         Sincerely,    King Martin Wharton MD        CC: No Recipients    Martin Wharton MD  11/27/2024 11:01 AM  Signed  Chief Complaint: Recurrent left inguinal hernia        History of Present Illness: Patient is a 58-year-old  male who is an employee of [employer] who presents the office today with a recurrent left inguinal hernia.  Patient apparently underwent left inguinal herniorrhaphy 5 years ago in the [place].  Over the past several months he has developed left groin discomfort and a \"bump \"in his left groin primarily when he stands.  Is also aggravated with walking.  He denies any GI component.  He was actually seen in August 2023 in regards to this hernia but was unable to proceed with surgery at that time.  He presents the office today with increasing pain and symptoms in regards to this.  The bulge in his left groin is getting larger and he would like to have his hernia repaired sooner rather than later.        Past Medical History:   Medical History        Past Medical History:   Diagnosis Date   • Elevated PSA     • Hepatic steatosis     • Hepatomegaly     • High cholesterol     • Neck pain       takes robaxin prn               Past Surgical History:    Surgical History         Past Surgical History:   Procedure Laterality Date   • HERNIA REPAIR       • UT BX PROSTATE STRTCTC SATURATION SAMPLING IMG GID N/A 05/16/2023     Procedure: TRANSPERINEAL MRI FUSION BIOPSY PROSTATE;  Surgeon: Anna Traore MD;  [facility];  Service: Urology   • UT LAPS SURG [id number] RPBIC RAD W/NRV " SPARING ROBOT N/A 07/10/2023     Procedure: ROBOTIC RADICAL PROSTATECTOMY, PELVIC LYMPH NODE DISSECTION;  Surgeon: Marcio Sosa MD;  [facility];  Service: Urology   • PROSTATE BIOPSY   08/2021     benign   • PROSTATE SURGERY       • VARICOSE VEIN SURGERY                   Allergies:    Allergies   No Known Allergies           Medications:    Current Medications   Current Outpatient Medications:   •  acetaminophen (TYLENOL) 500 mg tablet, Take 1,000 mg by mouth every 6 (six) hours as needed for mild pain, Disp: , Rfl:   •  miconazole 2 % cream, , Disp: , Rfl:   •  omeprazole (PriLOSEC) 20 mg delayed release capsule, Take 1 capsule (20 mg total) by mouth daily as needed (acid reflux), Disp: 30 capsule, Rfl: 0  •  terbinafine (LamISIL) 250 mg tablet, , Disp: , Rfl:   •  triamcinolone (KENALOG) 0.1 % ointment, , Disp: , Rfl:   •  docusate sodium (COLACE) 100 mg capsule, Take 1 capsule (100 mg total) by mouth 2 (two) times a day for 14 days, Disp: 28 capsule, Rfl: 0  •  senna (SENOKOT) 8.6 mg, Take 1 tablet (8.6 mg total) by mouth daily at bedtime for 14 days (Patient not taking: Reported on 8/8/2023), Disp: 14 tablet, Rfl: 0  •  tadalafil (CIALIS) 20 MG tablet, Take 1 tablet (20 mg total) by mouth daily as needed for erectile dysfunction (Patient not taking: Reported on 8/8/2023), Disp: 10 tablet, Rfl: 5           Social History:    Social History            Social History           Substance and Sexual Activity   Alcohol Use Yes     Comment: socially      Social History          Substance and Sexual Activity   Drug Use Never      Tobacco Use History   Social History          Tobacco Use   Smoking Status Never   Smokeless Tobacco Never               Family History:    Family History         Family History   Problem Relation Age of Onset   • Cancer Mother                 Review of Systems:    As per the HPI.  He does admit to some urinary incontinence primarily when he moves around.  No weight loss weight  gain fever chills night sweats chest pain nausea vomit diarrhea constipation shortness of breath headaches blurry vision double vision sore throat chronic cough etc.     Vitals:      Vitals:     08/08/23 1311   BP: 118/68   Pulse: 67   Temp: 98 °F (36.7 °C)   SpO2: 97%         Physical Exam:  Healthy-appearing adult  male 5 foot 2 inches under 47 pounds.  He is awake alert no distress.     Vital signs as above    Skin warm dry  Head normocephalic and atraumatic  Eyes PERRLA EOM intact  Ears nose within normal limits active throat gag reflex intact  Neck no masses thigh megaly lymphadenopathy palpable.  Back no severe spinal tenderness  Lungs clear to A&P  Cor regular rhythm no murmurs carotid bruits  Abdomen: There is a visible bulge in his left groin.  He has laparoscopic incisions throughout the abdomen and periumbilical area.  The right groin demonstrates no evidence of a hernia.  Left groin shows a well-healed scar and a large hernia that is reducible primarily when the patient is a supine position.  He is no other abdominal masses or hernias noted.  Extremities negative CCE  Neurologically ANO x 3 cranial nerves II to XII intact  Lymphatics no lymphadenopathy palpable.     Lab Results: I have personally reviewed pertinent reports. See below.  Imaging: I have personally reviewed pertinent CT scan of the abdomen and pelvis  EKG, Pathology, and Other Studies: I have personally reviewed pertinent reports.            No visits with results within 1 Day(s) from this visit.   Latest known visit with results is:   Office Visit on 07/28/2023   Component Date Value   • POST-VOID RESIDUAL VOLUM* 07/28/2023 0             Impression:  Recurrent left inguinal hernia and a patient status post robotic prostatectomy.  Becoming larger and more symptomatic.     Plan:  Schedule repair open left inguinal herniorrhaphy for recurrent left inguinal hernia with mesh at the earliest possible date.  Local sedation.           "    Instructions      Return in about 2 months (around 10/8/2023) for Recheck.  Additional Documentation    Vitals: /68 (BP Location: Left arm, Patient Position: Sitting, Cuff Size: Standard)     Pulse 67     Temp 98 °F (36.7 °C) (Tympanic)     Ht 5' 2\" (1.575 m)     Wt 66.7 kg (147 lb)     SpO2 97%     BMI 26.89 kg/m²     BSA 1.68 m²   SmartForms:  MELINDA PRE-CHARTING   Encounter Info: Billing Info,     Histor       "

## 2024-11-27 NOTE — H&P
"Chief Complaint: Recurrent left inguinal hernia        History of Present Illness: Patient is a 58-year-old  male who is an employee of [employer] who presents the office today with a recurrent left inguinal hernia.  Patient apparently underwent left inguinal herniorrhaphy 5 years ago in the [place].  Over the past several months he has developed left groin discomfort and a \"bump \"in his left groin primarily when he stands.  Is also aggravated with walking.  He denies any GI component.  He was actually seen in August 2023 in regards to this hernia but was unable to proceed with surgery at that time.  He presents the office today with increasing pain and symptoms in regards to this.  The bulge in his left groin is getting larger and he would like to have his hernia repaired sooner rather than later.        Past Medical History:   Medical History        Past Medical History:   Diagnosis Date    Elevated PSA      Hepatic steatosis      Hepatomegaly      High cholesterol      Neck pain       takes robaxin prn               Past Surgical History:    Surgical History         Past Surgical History:   Procedure Laterality Date    HERNIA REPAIR        OR BX PROSTATE STRTCTC SATURATION SAMPLING IMG GID N/A 05/16/2023     Procedure: TRANSPERINEAL MRI FUSION BIOPSY PROSTATE;  Surgeon: Anna Traore MD;  [facility];  Service: Urology    OR LAPS SURG [id number] RPBIC RAD W/NRV SPARING ROBOT N/A 07/10/2023     Procedure: ROBOTIC RADICAL PROSTATECTOMY, PELVIC LYMPH NODE DISSECTION;  Surgeon: Marcio Sosa MD;  [facility];  Service: Urology    PROSTATE BIOPSY   08/2021     benign    PROSTATE SURGERY        VARICOSE VEIN SURGERY                   Allergies:    Allergies   No Known Allergies           Medications:    Current Medications   Current Outpatient Medications:     acetaminophen (TYLENOL) 500 mg tablet, Take 1,000 mg by mouth every 6 (six) hours as needed for mild pain, Disp: , Rfl:     miconazole 2 % " cream, , Disp: , Rfl:     omeprazole (PriLOSEC) 20 mg delayed release capsule, Take 1 capsule (20 mg total) by mouth daily as needed (acid reflux), Disp: 30 capsule, Rfl: 0    terbinafine (LamISIL) 250 mg tablet, , Disp: , Rfl:     triamcinolone (KENALOG) 0.1 % ointment, , Disp: , Rfl:     docusate sodium (COLACE) 100 mg capsule, Take 1 capsule (100 mg total) by mouth 2 (two) times a day for 14 days, Disp: 28 capsule, Rfl: 0    senna (SENOKOT) 8.6 mg, Take 1 tablet (8.6 mg total) by mouth daily at bedtime for 14 days (Patient not taking: Reported on 8/8/2023), Disp: 14 tablet, Rfl: 0    tadalafil (CIALIS) 20 MG tablet, Take 1 tablet (20 mg total) by mouth daily as needed for erectile dysfunction (Patient not taking: Reported on 8/8/2023), Disp: 10 tablet, Rfl: 5           Social History:    Social History            Social History           Substance and Sexual Activity   Alcohol Use Yes     Comment: socially      Social History          Substance and Sexual Activity   Drug Use Never      Tobacco Use History   Social History          Tobacco Use   Smoking Status Never   Smokeless Tobacco Never               Family History:    Family History         Family History   Problem Relation Age of Onset    Cancer Mother                 Review of Systems:    As per the HPI.  He does admit to some urinary incontinence primarily when he moves around.  No weight loss weight gain fever chills night sweats chest pain nausea vomit diarrhea constipation shortness of breath headaches blurry vision double vision sore throat chronic cough etc.     Vitals:      Vitals:     08/08/23 1311   BP: 118/68   Pulse: 67   Temp: 98 °F (36.7 °C)   SpO2: 97%         Physical Exam:  Healthy-appearing adult  male 5 foot 2 inches under 47 pounds.  He is awake alert no distress.     Vital signs as above    Skin warm dry  Head normocephalic and atraumatic  Eyes PERRLA EOM intact  Ears nose within normal limits active throat gag reflex  "intact  Neck no masses thigh megaly lymphadenopathy palpable.  Back no severe spinal tenderness  Lungs clear to A&P  Cor regular rhythm no murmurs carotid bruits  Abdomen: There is a visible bulge in his left groin.  He has laparoscopic incisions throughout the abdomen and periumbilical area.  The right groin demonstrates no evidence of a hernia.  Left groin shows a well-healed scar and a large hernia that is reducible primarily when the patient is a supine position.  He is no other abdominal masses or hernias noted.  Extremities negative CCE  Neurologically ANO x 3 cranial nerves II to XII intact  Lymphatics no lymphadenopathy palpable.     Lab Results: I have personally reviewed pertinent reports. See below.  Imaging: I have personally reviewed pertinent CT scan of the abdomen and pelvis  EKG, Pathology, and Other Studies: I have personally reviewed pertinent reports.            No visits with results within 1 Day(s) from this visit.   Latest known visit with results is:   Office Visit on 07/28/2023   Component Date Value    POST-VOID RESIDUAL VOLUM* 07/28/2023 0             Impression:  Recurrent left inguinal hernia and a patient status post robotic prostatectomy.  Becoming larger and more symptomatic.     Plan:  Schedule repair open left inguinal herniorrhaphy for recurrent left inguinal hernia with mesh at the earliest possible date.  Local sedation.              Instructions      Return in about 2 months (around 10/8/2023) for Recheck.  Additional Documentation    Vitals: /68 (BP Location: Left arm, Patient Position: Sitting, Cuff Size: Standard)     Pulse 67     Temp 98 °F (36.7 °C) (Tympanic)     Ht 5' 2\" (1.575 m)     Wt 66.7 kg (147 lb)     SpO2 97%     BMI 26.89 kg/m²     BSA 1.68 m²   SmartForms:  MELINDA PRE-CHARTING   Encounter Info: Billing Info,     Histor     "

## 2024-12-10 NOTE — PRE-PROCEDURE INSTRUCTIONS
Pre-Surgery Instructions:   Medication Instructions    acetaminophen (TYLENOL) 325 mg suppository Uses PRN- OK to take day of surgery    sildenafil (VIAGRA) 100 mg tablet Uses PRN- DO NOT take day of surgery    tadalafil (CIALIS) 20 MG tablet Uses PRN- DO NOT take day of surgery     Pt verbalizes understanding of the following:    Please reference your “My Surgical Experience Booklet” for additional information to prepare for your upcoming surgery.      - DO NOT EAT OR DRINK ANYTHING after midnight on the evening before your procedure including coffee, tea, gum or hard candy.    - ONLY SIPS OF WATER with your medications are allowed on the morning of your procedure.  - Avoid OTC non-directed Vit/ Suppl/ Herbals 7 days prior to surgery to ensure no drug interactions with perioperative surgical/ anesthetic meds  - Avoid NSAIDs 3 days prior. Tylenol is ok to take as needed.   - Avoid ASA containing products 5 days prior, unless otherwise instructed by your provider     - Avoid alcohol 24hrs before your surgery.     - Follow the pre surgery showering instructions as listed in the “My Surgical Experience Booklet” or otherwise provided by your surgeon's office.  - Bathing instructions, has chg, neck down, no genitals  - No lotions, powders, sprays, deodorant, cologne, jewelry, body piercings  - Do not use a blade to shave the surgical area 1 week before surgery. It is ok to use clean electric clippers up to 24 hours before surgery. Do not shave any body parts with a razor within 24hrs.  - Do not use dry shampoo, hair spray, hair gel, or any type of hair products.     - For outpatient surgery, arrange for someone to drive you home after the procedure & stay with you until the next morning. Visitor guidelines discussed.   - Bring insurance cards & photo id    - Leave all valuables such as credit cards, money & jewelry at home  - Wear causal clothing that is easy to take on and off. Consider your type of surgery.    - Notify  surgeon if you develop any new illnesses, exposure, develop a rash/ open wounds or have additional questions prior to your surgery.    - Did the surgeon's office give you any other special instructions? no  - Did surgeon require any clearances? no    You will receive a call one business day prior to surgery with an arrival time and hospital directions. If your surgery is scheduled on a Monday, the hospital will be calling you on the Friday prior to your surgery.     Please confirm the visitor policy for the day of your procedure when you receive your phone call with an arrival time.

## 2024-12-16 ENCOUNTER — ANESTHESIA (OUTPATIENT)
Dept: PERIOP | Facility: HOSPITAL | Age: 59
End: 2024-12-16
Payer: COMMERCIAL

## 2024-12-16 ENCOUNTER — ANESTHESIA EVENT (OUTPATIENT)
Dept: PERIOP | Facility: HOSPITAL | Age: 59
End: 2024-12-16
Payer: COMMERCIAL

## 2024-12-16 ENCOUNTER — HOSPITAL ENCOUNTER (OUTPATIENT)
Facility: HOSPITAL | Age: 59
Setting detail: OUTPATIENT SURGERY
Discharge: HOME | End: 2024-12-16
Attending: SPECIALIST | Admitting: SPECIALIST
Payer: COMMERCIAL

## 2024-12-16 VITALS
WEIGHT: 151 LBS | HEART RATE: 72 BPM | RESPIRATION RATE: 18 BRPM | TEMPERATURE: 97.6 F | OXYGEN SATURATION: 96 % | BODY MASS INDEX: 27.79 KG/M2 | SYSTOLIC BLOOD PRESSURE: 108 MMHG | HEIGHT: 62 IN | DIASTOLIC BLOOD PRESSURE: 70 MMHG

## 2024-12-16 DIAGNOSIS — K40.91 RECURRENT LEFT INGUINAL HERNIA: Primary | ICD-10-CM

## 2024-12-16 DIAGNOSIS — K40.91 RECURRENT INGUINAL HERNIA: ICD-10-CM

## 2024-12-16 PROCEDURE — C1781 MESH (IMPLANTABLE): HCPCS | Performed by: SPECIALIST

## 2024-12-16 PROCEDURE — 49520 REREPAIR ING HERNIA REDUCE: CPT | Performed by: SPECIALIST

## 2024-12-16 DEVICE — BARD MESH, 3" X 6" (7.6 CM X 15 CM)
Type: IMPLANTABLE DEVICE | Site: INGUINAL | Status: FUNCTIONAL
Brand: BARD

## 2024-12-16 RX ORDER — MAGNESIUM HYDROXIDE 1200 MG/15ML
LIQUID ORAL AS NEEDED
Status: DISCONTINUED | OUTPATIENT
Start: 2024-12-16 | End: 2024-12-16 | Stop reason: HOSPADM

## 2024-12-16 RX ORDER — FENTANYL CITRATE 50 UG/ML
INJECTION, SOLUTION INTRAMUSCULAR; INTRAVENOUS AS NEEDED
Status: DISCONTINUED | OUTPATIENT
Start: 2024-12-16 | End: 2024-12-16

## 2024-12-16 RX ORDER — ONDANSETRON 2 MG/ML
4 INJECTION INTRAMUSCULAR; INTRAVENOUS ONCE AS NEEDED
Status: DISCONTINUED | OUTPATIENT
Start: 2024-12-16 | End: 2024-12-16 | Stop reason: HOSPADM

## 2024-12-16 RX ORDER — MIDAZOLAM HYDROCHLORIDE 2 MG/2ML
INJECTION, SOLUTION INTRAMUSCULAR; INTRAVENOUS AS NEEDED
Status: DISCONTINUED | OUTPATIENT
Start: 2024-12-16 | End: 2024-12-16

## 2024-12-16 RX ORDER — ONDANSETRON 2 MG/ML
INJECTION INTRAMUSCULAR; INTRAVENOUS AS NEEDED
Status: DISCONTINUED | OUTPATIENT
Start: 2024-12-16 | End: 2024-12-16

## 2024-12-16 RX ORDER — OXYCODONE AND ACETAMINOPHEN 5; 325 MG/1; MG/1
1 TABLET ORAL EVERY 4 HOURS PRN
Qty: 20 TABLET | Refills: 0 | Status: SHIPPED | OUTPATIENT
Start: 2024-12-16 | End: 2024-12-26

## 2024-12-16 RX ORDER — CEFAZOLIN SODIUM 2 G/50ML
2000 SOLUTION INTRAVENOUS ONCE
Status: COMPLETED | OUTPATIENT
Start: 2024-12-16 | End: 2024-12-16

## 2024-12-16 RX ORDER — PROPOFOL 10 MG/ML
INJECTION, EMULSION INTRAVENOUS AS NEEDED
Status: DISCONTINUED | OUTPATIENT
Start: 2024-12-16 | End: 2024-12-16

## 2024-12-16 RX ORDER — KETOROLAC TROMETHAMINE 30 MG/ML
INJECTION, SOLUTION INTRAMUSCULAR; INTRAVENOUS AS NEEDED
Status: DISCONTINUED | OUTPATIENT
Start: 2024-12-16 | End: 2024-12-16

## 2024-12-16 RX ORDER — BUPIVACAINE HYDROCHLORIDE 5 MG/ML
INJECTION, SOLUTION EPIDURAL; INTRACAUDAL AS NEEDED
Status: DISCONTINUED | OUTPATIENT
Start: 2024-12-16 | End: 2024-12-16 | Stop reason: HOSPADM

## 2024-12-16 RX ORDER — ONDANSETRON 2 MG/ML
4 INJECTION INTRAMUSCULAR; INTRAVENOUS EVERY 8 HOURS PRN
Status: DISCONTINUED | OUTPATIENT
Start: 2024-12-16 | End: 2024-12-16 | Stop reason: HOSPADM

## 2024-12-16 RX ORDER — OXYCODONE AND ACETAMINOPHEN 5; 325 MG/1; MG/1
1 TABLET ORAL EVERY 4 HOURS PRN
Status: DISCONTINUED | OUTPATIENT
Start: 2024-12-16 | End: 2024-12-16 | Stop reason: HOSPADM

## 2024-12-16 RX ORDER — SODIUM CHLORIDE, SODIUM LACTATE, POTASSIUM CHLORIDE, CALCIUM CHLORIDE 600; 310; 30; 20 MG/100ML; MG/100ML; MG/100ML; MG/100ML
INJECTION, SOLUTION INTRAVENOUS CONTINUOUS PRN
Status: DISCONTINUED | OUTPATIENT
Start: 2024-12-16 | End: 2024-12-16

## 2024-12-16 RX ORDER — LIDOCAINE HYDROCHLORIDE 10 MG/ML
INJECTION, SOLUTION EPIDURAL; INFILTRATION; INTRACAUDAL; PERINEURAL AS NEEDED
Status: DISCONTINUED | OUTPATIENT
Start: 2024-12-16 | End: 2024-12-16 | Stop reason: HOSPADM

## 2024-12-16 RX ORDER — DEXAMETHASONE SODIUM PHOSPHATE 10 MG/ML
INJECTION, SOLUTION INTRAMUSCULAR; INTRAVENOUS AS NEEDED
Status: DISCONTINUED | OUTPATIENT
Start: 2024-12-16 | End: 2024-12-16

## 2024-12-16 RX ORDER — PROPOFOL 10 MG/ML
INJECTION, EMULSION INTRAVENOUS CONTINUOUS PRN
Status: DISCONTINUED | OUTPATIENT
Start: 2024-12-16 | End: 2024-12-16

## 2024-12-16 RX ORDER — EPHEDRINE SULFATE 50 MG/ML
INJECTION INTRAVENOUS AS NEEDED
Status: DISCONTINUED | OUTPATIENT
Start: 2024-12-16 | End: 2024-12-16

## 2024-12-16 RX ORDER — FENTANYL CITRATE/PF 50 MCG/ML
50 SYRINGE (ML) INJECTION
Status: DISCONTINUED | OUTPATIENT
Start: 2024-12-16 | End: 2024-12-16 | Stop reason: HOSPADM

## 2024-12-16 RX ADMIN — PROPOFOL 50 MG: 10 INJECTION, EMULSION INTRAVENOUS at 14:08

## 2024-12-16 RX ADMIN — PROPOFOL 130 MCG/KG/MIN: 10 INJECTION, EMULSION INTRAVENOUS at 13:43

## 2024-12-16 RX ADMIN — DEXAMETHASONE SODIUM PHOSPHATE 10 MG: 10 INJECTION, SOLUTION INTRAMUSCULAR; INTRAVENOUS at 13:46

## 2024-12-16 RX ADMIN — FENTANYL CITRATE 50 MCG: 50 INJECTION, SOLUTION INTRAMUSCULAR; INTRAVENOUS at 14:08

## 2024-12-16 RX ADMIN — FENTANYL CITRATE 50 MCG: 0.05 INJECTION, SOLUTION INTRAMUSCULAR; INTRAVENOUS at 16:35

## 2024-12-16 RX ADMIN — CEFAZOLIN SODIUM 2000 MG: 2 SOLUTION INTRAVENOUS at 13:41

## 2024-12-16 RX ADMIN — ONDANSETRON 4 MG: 2 INJECTION INTRAMUSCULAR; INTRAVENOUS at 13:38

## 2024-12-16 RX ADMIN — MIDAZOLAM 2 MG: 1 INJECTION INTRAMUSCULAR; INTRAVENOUS at 13:38

## 2024-12-16 RX ADMIN — KETOROLAC TROMETHAMINE 30 MG: 30 INJECTION, SOLUTION INTRAMUSCULAR; INTRAVENOUS at 14:49

## 2024-12-16 RX ADMIN — FENTANYL CITRATE 50 MCG: 50 INJECTION, SOLUTION INTRAMUSCULAR; INTRAVENOUS at 13:47

## 2024-12-16 RX ADMIN — SODIUM CHLORIDE, SODIUM LACTATE, POTASSIUM CHLORIDE, AND CALCIUM CHLORIDE: .6; .31; .03; .02 INJECTION, SOLUTION INTRAVENOUS at 13:38

## 2024-12-16 RX ADMIN — OXYCODONE HYDROCHLORIDE AND ACETAMINOPHEN 1 TABLET: 5; 325 TABLET ORAL at 18:10

## 2024-12-16 RX ADMIN — EPHEDRINE SULFATE 10 MG: 50 INJECTION INTRAVENOUS at 14:14

## 2024-12-16 RX ADMIN — EPHEDRINE SULFATE 10 MG: 50 INJECTION INTRAVENOUS at 14:23

## 2024-12-16 NOTE — NURSING NOTE
No distress upon return from PACU earlier. Tolerated toast and drinks.  Ambulated to the bathroom with supervision of staff member. Voided. Steri strips remained clean, dry and intact. No drainage. Medicated with Percocet 1 tab at 1810. Will reassess pain level.

## 2024-12-16 NOTE — ANESTHESIA POSTPROCEDURE EVALUATION
Post-Op Assessment Note    CV Status:  Stable    Pain management: adequate       Mental Status:  Alert and awake   Hydration Status:  Euvolemic   PONV Controlled:  Controlled   Airway Patency:  Patent     Post Op Vitals Reviewed: Yes    No anethesia notable event occurred.    Staff: Anesthesiologist           Last Filed PACU Vitals:  Vitals Value Taken Time   Temp     Pulse 72 12/16/24 1622   /64 12/16/24 1615   Resp 70 12/16/24 1622   SpO2 92 % 12/16/24 1622   Vitals shown include unfiled device data.    Modified Graciela:  Activity: 0 (12/16/2024  3:45 PM)  Respiration: 2 (12/16/2024  3:45 PM)  Circulation: 2 (12/16/2024  3:45 PM)  Consciousness: 0 (12/16/2024  3:45 PM)  Oxygen Saturation: 1 (12/16/2024  3:45 PM)  Modified Graciela Score: 5 (12/16/2024  3:45 PM)

## 2024-12-16 NOTE — ANESTHESIA PREPROCEDURE EVALUATION
Procedure:  REPAIR RECURRENT LEFT INGUINAL HERNIA WITH MESH (Left: Groin)    Relevant Problems   CARDIO   (+) Pure hypertriglyceridemia   (+) Varicose veins of lower extremity with inflammation, bilateral      GI/HEPATIC   (+) Gastroesophageal reflux disease with esophagitis without hemorrhage   (+) Hepatic steatosis      /RENAL   (+) Prostate cancer (HCC)      Urinary   (+) Stress incontinence      Rheumatology   (+) Protruded cervical disc      Surgery/Wound/Pain   (+) History of left inguinal hernia repair   (+) Postprocedural stricture of anterior urethra      Orthopedic/Musculoskeletal   (+) Cervicalgia      Other   (+) Elevated transaminase level   (+) Pre-diabetes   (+) Recurrent hernia      Lab Results   Component Value Date    SODIUM 141 02/19/2024    K 4.2 02/19/2024     02/19/2024    CO2 27 02/19/2024    AGAP 7 02/19/2024    BUN 17 02/19/2024    CREATININE 0.69 02/19/2024    GLUC 114 07/11/2023    GLUF 96 02/19/2024    CALCIUM 9.2 02/19/2024    AST 18 02/19/2024    ALT 27 02/19/2024    ALKPHOS 60 02/19/2024    TP 6.7 02/19/2024    TBILI 1.11 (H) 02/19/2024    EGFR 105 02/19/2024     Lab Results   Component Value Date    WBC 8.06 02/19/2024    HGB 15.3 02/19/2024    HCT 45.5 02/19/2024    MCV 90 02/19/2024     02/19/2024       Physical Exam    Airway    Mallampati score: I  TM Distance: >3 FB  Neck ROM: full     Dental       Cardiovascular      Pulmonary      Other Findings        Anesthesia Plan  ASA Score- 2     Anesthesia Type- IV sedation with anesthesia with ASA Monitors.         Additional Monitors:     Airway Plan:            Plan Factors-Exercise tolerance (METS): >4 METS.    Chart reviewed. EKG reviewed. Imaging results reviewed. Existing labs reviewed. Patient summary reviewed.                  Induction- intravenous.    Postoperative Plan-         Informed Consent- Anesthetic plan and risks discussed with patient.  I personally reviewed this patient with the CRNA. Discussed and  agreed on the Anesthesia Plan with the CRNA..

## 2024-12-16 NOTE — INTERVAL H&P NOTE
H&P reviewed. After examining the patient I find no changes in the patients condition since the H&P had been written.    Vitals:    12/16/24 1154   BP: 105/73   Pulse: 66   Resp: 18   Temp: (!) 97 °F (36.1 °C)   SpO2: 98%

## 2024-12-16 NOTE — ANESTHESIA POSTPROCEDURE EVALUATION
Post-Op Assessment Note    CV Status:  Stable  Pain Score: 0    Pain management: adequate    Multimodal analgesia used between 6 hours prior to anesthesia start to PACU discharge    Mental Status:  Sleepy   Hydration Status:  Stable   PONV Controlled:  None   Airway Patency:  Patent  Airway: intubated  There is a medical reason for not screening for obstructive sleep apnea and/or for not using two or more mitigation strategies   Post Op Vitals Reviewed: Yes    No anethesia notable event occurred.    Staff: CRNA           Last Filed PACU Vitals:  Vitals Value Taken Time   Temp 97.4    Pulse 71 12/16/24 1546   /55    Resp 16    SpO2 96 % 12/16/24 1546   Vitals shown include unfiled device data.    Modified Graciela:  No data recorded

## 2024-12-16 NOTE — OP NOTE
OPERATIVE REPORT  PATIENT NAME: Lencho Mojica    :  1965  MRN: [id number]  Pt Location:  OR ROOM 07    SURGERY DATE: 2024    Surgeons and Role:     * Martin Wharton MD - Primary    Preop Diagnosis:   Recurrent inguinal hernia [K40.91] left    Post-Op Diagnosis Codes:     * Recurrent inguinal hernia [K40.91] left    Procedure(s):  Left - REPAIR RECURRENT LEFT INGUINAL HERNIA WITH MESH    Specimen(s):  * No specimens in log *    Estimated Blood Loss:   Minimal    Drains:  Closed/Suction Drain LLQ Bulb 19 Fr. (Active)   Number of days: 525       Urethral Catheter Other (Comment) 20 Fr. (Active)   Number of days: 525       Anesthesia Type:   IV Sedation with Anesthesia    Operative Indications:  Recurrent inguinal hernia [K40.91]  Pain    Operative Findings:  Old mesh.  Hernia protruding under the mesh through the external ring.  The spermatic cord was never identified during the procedure.      Complications:   None    Procedure and Technique:  The patient was brought to the operating room and placed on the operative table in the supine position.  Under adequate IV sedation the left groin was shaved prepped and draped in usual sterile fashion.    1% lidocaine was used infiltrate the old inguinal nerve and left inguinal area.  The skin over the left inguinal canal was also infiltrated.  There was an old oblique scar in the left groin.  A transverse incision is made just above this and carried out through the subcutaneous tissue using the Bovie.  Bleeders cauterized at that time.  Jessica's fascia divided direction of the incision.  Retractors were inserted in the wound.  Dissection carried deeper and the area was palpated.  There was obvious mass palpated deeper and dissection carried deeper.  Eventually the external beak aponeurosis was identified.  Scar tissue was abundant and this was lysed to allow further identification of normal structures.  Eventually the external ring was identified and  there appeared to be a few bulges protruding through this area.  Further dissection freed up these bulges and these appeared to be hernia sacs.  These were open and fat was identified.  Mobilizing some of this fat into the wound it appeared to be omentum and this was replaced back.  Palpating in the defect this was an obvious recurrent inguinal hernia.  The mesh appeared to be anterior to the defect essentially not repairing the hernia.  The mesh was mobilized laterally and medially and flipped up to allow inspection of the defect.  Once again fat was mobilized into the wound and it appeared to be omentum.  This was were placed back in the abdominal cavity.  At that point it was felt that this was indeed the hernia.  The idea of how to fix it was then pondered.  He felt that the mesh plug would be the best way of doing this and then maybe reapproximating the previous mesh on top of this.  Flipping the mesh up the sac was reapproximated with 3-0 Vicryl in a running fashion.  At that point a 3 x 6 piece of mesh was obtained.  It was folded rolled and then passed through the defect.  It was sutured in place using 2-0 Prolene suture to anchor it.  This appeared to effectively repaired the hernia.  At that point the overlying mesh that had been flipped up was then reapproximated back using 2-0 Prolene in interrupted fashion.  This effectively passed the previously placed plug reinforcing the area.  At that point the area was inspected for bleeding.  There was no bleeding noted.  There was infiltrated with half some Marcaine.  Any bleeding after the injection was controlled with electrocautery and direct pressure.  At that point after adequate hemostasis obtained the wound was closed in layers using 3-0 Vicryl on Jessica's fascia and subcutaneous tissue.  Skin was closed with 4 Monocryl in a subcuticular fashion.  Benzoin and Steri-Strips were applied.  The estimated blood loss was minimal patient tolerated the procedure  well he was delivered to the recovery room stable condition.  Thanks.   I was present for the entire procedure.    Patient Disposition:  PACU              SIGNATURE: Martin Wharton MD  DATE: December 16, 2024  TIME: 4:59 PM

## 2024-12-20 ENCOUNTER — TELEPHONE (OUTPATIENT)
Age: 59
End: 2024-12-20

## 2024-12-20 NOTE — TELEPHONE ENCOUNTER
Patient of Dr Wharton s/p left inguinal hernia repair on 12/16.    He is calling today because he has redness and swelling in his left testicle. He stated it looks like a potato.  He stated that he is eating and drinking without issue. Urinating and having bowel movements. Denies pain, fever or n/v at this time. Patient stated the incision area looks fine and still has steri strips intact.    Explained that this swelling is not uncommon. Patient is taking oxycodone as needed for discomfort. Advised him to try icing the area as well.  Patient understands and will return call with additional questions or change in symptoms.    #[phone]   Patient informed

## 2025-01-02 ENCOUNTER — APPOINTMENT (OUTPATIENT)
Dept: LAB | Facility: CLINIC | Age: 60
End: 2025-01-02
Payer: COMMERCIAL

## 2025-01-03 ENCOUNTER — OFFICE VISIT (OUTPATIENT)
Age: 60
End: 2025-01-03
Payer: COMMERCIAL

## 2025-01-03 VITALS
HEIGHT: 62 IN | WEIGHT: 149 LBS | DIASTOLIC BLOOD PRESSURE: 60 MMHG | OXYGEN SATURATION: 97 % | SYSTOLIC BLOOD PRESSURE: 100 MMHG | BODY MASS INDEX: 27.42 KG/M2 | HEART RATE: 84 BPM

## 2025-01-03 DIAGNOSIS — C61 PROSTATE CANCER (HCC): Primary | ICD-10-CM

## 2025-01-03 PROCEDURE — 99213 OFFICE O/P EST LOW 20 MIN: CPT | Performed by: PHYSICIAN ASSISTANT

## 2025-01-03 NOTE — PROGRESS NOTES
"  UROLOGY PROGRESS NOTE   Patient Identifiers: Lencho Mojica (MRN [id number])  Date of Service: 1/3/2025    Subjective:   59-year-old man history of Danny 7 stage II prostate cancer.  He had a robot prostatectomy in June 2023.  PSA remains undetectable<0.008.  Good urinary control.  No luck with erections at this point and he is hesitant to retry any intracorporeal injection although I would go as low as possible maybe 5 or 10 mcg of alprostadil alone.    Reason for visit: Prostate cancer follow-up    Objective:     VITALS:    Vitals:    01/03/25 1436   BP: 100/60   Pulse: 84   SpO2: 97%           LABS:  Lab Results   Component Value Date    HGB 15.3 02/19/2024    HCT 45.5 02/19/2024    WBC 8.06 02/19/2024     02/19/2024   ]    Lab Results   Component Value Date    K 4.2 02/19/2024     02/19/2024    CO2 27 02/19/2024    BUN 17 02/19/2024    CREATININE 0.69 02/19/2024    CALCIUM 9.2 02/19/2024   ]        INPATIENT MEDS:    Current Outpatient Medications:     acetaminophen (TYLENOL) 325 mg suppository, Insert 325 mg into the rectum every 4 (four) hours as needed for mild pain, Disp: , Rfl:     sildenafil (VIAGRA) 100 mg tablet, Take 1 tablet (100 mg total) by mouth daily as needed for erectile dysfunction, Disp: 15 tablet, Rfl: 3    tadalafil (CIALIS) 20 MG tablet, Take 1 tablet (20 mg total) by mouth daily as needed for erectile dysfunction, Disp: 15 tablet, Rfl: 3      Physical Exam:   /60 (BP Location: Left arm, Patient Position: Sitting, Cuff Size: Standard)   Pulse 84   Ht 5' 2\" (1.575 m)   Wt 67.6 kg (149 lb)   SpO2 97%   BMI 27.25 kg/m²   GEN: no acute distress    RESP: breathing comfortably with no accessory muscle use    ABD: soft, non-tender, non-distended   INCISION:    EXT: no significant peripheral edema       RADIOLOGY:   none     Assessment:   #1.  Prostate cancer  #2.  Erectile dysfunction following radical prostatectomy    Plan:   -Follow-up in 6 months with PSA prior " to visit  -  -  -

## 2025-01-07 ENCOUNTER — OFFICE VISIT (OUTPATIENT)
Dept: SURGERY | Facility: CLINIC | Age: 60
End: 2025-01-07

## 2025-01-07 VITALS
TEMPERATURE: 98 F | HEART RATE: 75 BPM | HEIGHT: 62 IN | OXYGEN SATURATION: 98 % | BODY MASS INDEX: 27.42 KG/M2 | WEIGHT: 149 LBS

## 2025-01-07 DIAGNOSIS — K40.91 UNILATERAL RECURRENT INGUINAL HERNIA WITHOUT OBSTRUCTION OR GANGRENE: Primary | ICD-10-CM

## 2025-01-07 PROCEDURE — 99024 POSTOP FOLLOW-UP VISIT: CPT | Performed by: SPECIALIST

## 2025-01-07 NOTE — PROGRESS NOTES
Lencho for his follow-up visit status post repair of recurrent left inguinal hernia with mesh.    Today in the office he says he feels fine.  He says the area is a little swollen.  He has no pain in the area.  He is ambulating without difficulty.  He is bowels are moving and he is voiding without problem.    Physical exam: Middle-age  male awake alert no distress.    Left groin shows a fairly fresh incision with an old incision below it.  There is some postoperative edema noted.  No evidence of infection or recurrence.  Excellent cosmetic result.    Impression: Doing well status post repair of a recurrent left inguinal hernia with mesh.    Plan: He would like to go back to work.  We may allow him to return to work with a 20 pound weight limit for an additional month.  After that he has no restrictions.  He is given a paper to verify this.    Was a pleasure to meet him.  He is a great danna.

## 2025-02-17 ENCOUNTER — OFFICE VISIT (OUTPATIENT)
Dept: FAMILY MEDICINE CLINIC | Facility: CLINIC | Age: 60
End: 2025-02-17
Payer: COMMERCIAL

## 2025-02-17 VITALS
DIASTOLIC BLOOD PRESSURE: 70 MMHG | HEART RATE: 65 BPM | WEIGHT: 153 LBS | TEMPERATURE: 97.8 F | BODY MASS INDEX: 28.16 KG/M2 | HEIGHT: 62 IN | OXYGEN SATURATION: 100 % | SYSTOLIC BLOOD PRESSURE: 110 MMHG | RESPIRATION RATE: 16 BRPM

## 2025-02-17 DIAGNOSIS — K40.90 LEFT INGUINAL HERNIA: ICD-10-CM

## 2025-02-17 DIAGNOSIS — N52.37 ERECTILE DYSFUNCTION FOLLOWING PROSTATE ABLATIVE THERAPY: ICD-10-CM

## 2025-02-17 DIAGNOSIS — Z00.01 ENCOUNTER FOR GENERAL ADULT MEDICAL EXAMINATION WITH ABNORMAL FINDINGS: Primary | ICD-10-CM

## 2025-02-17 PROBLEM — M50.20 PROTRUDED CERVICAL DISC: Status: RESOLVED | Noted: 2023-07-05 | Resolved: 2025-02-17

## 2025-02-17 PROBLEM — M25.561 CHRONIC PAIN OF RIGHT KNEE: Status: RESOLVED | Noted: 2023-07-05 | Resolved: 2025-02-17

## 2025-02-17 PROBLEM — G89.29 CHRONIC PAIN OF RIGHT KNEE: Status: RESOLVED | Noted: 2023-07-05 | Resolved: 2025-02-17

## 2025-02-17 PROBLEM — E55.9 VITAMIN D DEFICIENCY: Status: RESOLVED | Noted: 2021-09-21 | Resolved: 2025-02-17

## 2025-02-17 PROCEDURE — 99396 PREV VISIT EST AGE 40-64: CPT | Performed by: FAMILY MEDICINE

## 2025-02-17 PROCEDURE — 99214 OFFICE O/P EST MOD 30 MIN: CPT | Performed by: FAMILY MEDICINE

## 2025-02-18 NOTE — ASSESSMENT & PLAN NOTE
- Current treatment with PDE5 inhibitors (sildenafil 100mg) showing suboptimal response  - Previously tried intracavernosal injections with excessive response and side effects  - Plan:  * Trial of combined therapy with PDE5 inhibitors and vacuum device  * Follow-up with Urology in June for possible prosthesis evaluation

## 2025-02-18 NOTE — PROGRESS NOTES
Name: Lencho Mojica      : 1965      MRN: [id number]  Encounter Provider: Ankit Gonzalez MD  Encounter Date: 2025   Encounter department: [facility]    Assessment & Plan  Encounter for general adult medical examination with abnormal findings  Lencho is here for a physical exam. I found him without any distress. The patient has the following chronic conditions   Patient Active Problem List   Diagnosis    Gastroesophageal reflux disease with esophagitis without hemorrhage    Pure hypertriglyceridemia    Hepatic steatosis    History of left inguinal hernia repair    Prostate cancer (HCC)    Cervicalgia    Elevated transaminase level    Overweight (BMI 25.0-29.9)    Stress incontinence    Erectile dysfunction following prostate ablative therapy    Recurrent hernia    Postprocedural stricture of anterior urethra    Pre-diabetes   .   I recommended that He exercise for at least 3.5 hours each week and maintain a healthy diet low in carbohydrates and saturated fats. I provided her with information about lifestyle modifications. The patient understands the importance of having an annual physical exam.     Orders:    CBC and differential; Future    Comprehensive metabolic panel; Future    Lipid Panel with Direct LDL reflex; Future    Left inguinal hernia  Status Post Inguinal Hernia Repair (2024)  - Reports post-surgical discomfort and sensation of recurrence with prolonged sitting  - Continue monitoring for healing  - Follow up with surgery as needed  Orders:    US inguinal area; Future    Erectile dysfunction following prostate ablative therapy  - Current treatment with PDE5 inhibitors (sildenafil 100mg) showing suboptimal response  - Previously tried intracavernosal injections with excessive response and side effects  - Plan:  * Trial of combined therapy with PDE5 inhibitors and vacuum device  * Follow-up with Urology in  for possible  "prosthesis evaluation                          Subjective       71-year-old male presents for follow-up of erectile dysfunction following radical prostatectomy performed approximately 2 years ago (July 2023). Patient reports ongoing difficulties with erectile function despite various treatments. Currently using PDE5 inhibitors (sildenafil 100mg) with suboptimal response, describing erections as 'fragile' and insufficient for penetration. Previously tried intracavernosal injections but experienced prolonged erections (4-5 hours) and post-injection pain lasting 3-4 days. Has vacuum device but hasn't used it in combination with oral medications. Recent prostate follow-up showed PSA <0.2. Also reports recent inguinal hernia repair in December 2024 with some residual discomfort and sensation of bulging when sitting for prolonged periods. Partner is understanding of the situation. Patient interested in exploring additional treatment options.  No Known Allergies    Patient Active Problem List   Diagnosis    Gastroesophageal reflux disease with esophagitis without hemorrhage    Pure hypertriglyceridemia    Hepatic steatosis    History of left inguinal hernia repair    Prostate cancer 2023(HCC)    Cervicalgia    Elevated transaminase level    Overweight (BMI 25.0-29.9)    Stress incontinence    Erectile dysfunction following prostate ablative therapy    Recurrent hernia    Postprocedural stricture of anterior urethra    Pre-diabetes         Current Outpatient Medications:     sildenafil (VIAGRA) 100 mg tablet, Take 1 tablet (100 mg total) by mouth daily as needed for erectile dysfunction, Disp: 15 tablet, Rfl: 3    tadalafil (CIALIS) 20 MG tablet, Take 1 tablet (20 mg total) by mouth daily as needed for erectile dysfunction, Disp: 15 tablet, Rfl: 3    Objective:  /70 (BP Location: Left arm, Patient Position: Sitting, Cuff Size: Standard)   Pulse 65   Temp 97.8 °F (36.6 °C) (Tympanic)   Resp 16   Ht 5' 2\" (1.575 m)  "  Wt 69.4 kg (153 lb)   SpO2 100%   BMI 27.98 kg/m²     Recent Studies:  - PSA: <0.2 ng/mL    Physical Exam:  The patient appears to without distress; HEENT is unremarkable, the neck has no masses or enlarged lymph nodes. Respiratory effort is normal. Lung fields are clear; the heart has a normal rhythm without murmurs. Abdomen soft without abnormalities;  Neurological with no focal deficits. Mass in left groin area, where he had surgical repair recently. Ausculted bowels sound.      Pending Studies:  - Comprehensive metabolic panel  - Lipid panel  - HbA1c  - Penile ultrasound          Ankit Gonzalez MD   [facility] CARE [facility]

## 2025-02-18 NOTE — ASSESSMENT & PLAN NOTE
Lencho is here for a physical exam. I found him without any distress. The patient has the following chronic conditions   Patient Active Problem List   Diagnosis    Gastroesophageal reflux disease with esophagitis without hemorrhage    Pure hypertriglyceridemia    Hepatic steatosis    History of left inguinal hernia repair    Prostate cancer 2023(HCC)    Cervicalgia    Elevated transaminase level    Overweight (BMI 25.0-29.9)    Stress incontinence    Erectile dysfunction following prostate ablative therapy    Recurrent hernia    Postprocedural stricture of anterior urethra    Pre-diabetes   .   I recommended that He exercise for at least 3.5 hours each week and maintain a healthy diet low in carbohydrates and saturated fats. I provided her with information about lifestyle modifications. The patient understands the importance of having an annual physical exam.     Orders:    CBC and differential; Future    Comprehensive metabolic panel; Future    Lipid Panel with Direct LDL reflex; Future

## 2025-03-28 ENCOUNTER — APPOINTMENT (OUTPATIENT)
Dept: LAB | Facility: CLINIC | Age: 60
End: 2025-03-28
Payer: COMMERCIAL

## 2025-03-28 DIAGNOSIS — Z00.01 ENCOUNTER FOR GENERAL ADULT MEDICAL EXAMINATION WITH ABNORMAL FINDINGS: ICD-10-CM

## 2025-03-28 DIAGNOSIS — C61 PROSTATE CANCER (HCC): ICD-10-CM

## 2025-03-28 DIAGNOSIS — Z00.8 HEALTH EXAMINATION IN POPULATION SURVEY: ICD-10-CM

## 2025-03-28 LAB
ALBUMIN SERPL BCG-MCNC: 4.4 G/DL (ref 3.5–5)
ALP SERPL-CCNC: 64 U/L (ref 34–104)
ALT SERPL W P-5'-P-CCNC: 45 U/L (ref 7–52)
ANION GAP SERPL CALCULATED.3IONS-SCNC: 7 MMOL/L (ref 4–13)
AST SERPL W P-5'-P-CCNC: 25 U/L (ref 13–39)
BASOPHILS # BLD AUTO: 0.05 THOUSANDS/ÂΜL (ref 0–0.1)
BASOPHILS NFR BLD AUTO: 1 % (ref 0–1)
BILIRUB SERPL-MCNC: 0.72 MG/DL (ref 0.2–1)
BUN SERPL-MCNC: 15 MG/DL (ref 5–25)
CALCIUM SERPL-MCNC: 8.9 MG/DL (ref 8.4–10.2)
CHLORIDE SERPL-SCNC: 106 MMOL/L (ref 96–108)
CHOLEST SERPL-MCNC: 169 MG/DL (ref ?–200)
CO2 SERPL-SCNC: 26 MMOL/L (ref 21–32)
CREAT SERPL-MCNC: 0.69 MG/DL (ref 0.6–1.3)
EOSINOPHIL # BLD AUTO: 0.43 THOUSAND/ÂΜL (ref 0–0.61)
EOSINOPHIL NFR BLD AUTO: 6 % (ref 0–6)
ERYTHROCYTE [DISTWIDTH] IN BLOOD BY AUTOMATED COUNT: 12.3 % (ref 11.6–15.1)
EST. AVERAGE GLUCOSE BLD GHB EST-MCNC: 120 MG/DL
GFR SERPL CREATININE-BSD FRML MDRD: 104 ML/MIN/1.73SQ M
GLUCOSE P FAST SERPL-MCNC: 97 MG/DL (ref 65–99)
HBA1C MFR BLD: 5.8 %
HCT VFR BLD AUTO: 44.3 % (ref 36.5–49.3)
HDLC SERPL-MCNC: 37 MG/DL
HGB BLD-MCNC: 15 G/DL (ref 12–17)
IMM GRANULOCYTES # BLD AUTO: 0.07 THOUSAND/UL (ref 0–0.2)
IMM GRANULOCYTES NFR BLD AUTO: 1 % (ref 0–2)
LDLC SERPL CALC-MCNC: 75 MG/DL (ref 0–100)
LYMPHOCYTES # BLD AUTO: 1.82 THOUSANDS/ÂΜL (ref 0.6–4.47)
LYMPHOCYTES NFR BLD AUTO: 25 % (ref 14–44)
MCH RBC QN AUTO: 29.8 PG (ref 26.8–34.3)
MCHC RBC AUTO-ENTMCNC: 33.9 G/DL (ref 31.4–37.4)
MCV RBC AUTO: 88 FL (ref 82–98)
MONOCYTES # BLD AUTO: 0.46 THOUSAND/ÂΜL (ref 0.17–1.22)
MONOCYTES NFR BLD AUTO: 6 % (ref 4–12)
NEUTROPHILS # BLD AUTO: 4.35 THOUSANDS/ÂΜL (ref 1.85–7.62)
NEUTS SEG NFR BLD AUTO: 61 % (ref 43–75)
NRBC BLD AUTO-RTO: 0 /100 WBCS
PLATELET # BLD AUTO: 277 THOUSANDS/UL (ref 149–390)
PMV BLD AUTO: 10.2 FL (ref 8.9–12.7)
POTASSIUM SERPL-SCNC: 4.2 MMOL/L (ref 3.5–5.3)
PROT SERPL-MCNC: 6.8 G/DL (ref 6.4–8.4)
RBC # BLD AUTO: 5.03 MILLION/UL (ref 3.88–5.62)
SODIUM SERPL-SCNC: 139 MMOL/L (ref 135–147)
TRIGL SERPL-MCNC: 286 MG/DL (ref ?–150)
WBC # BLD AUTO: 7.18 THOUSAND/UL (ref 4.31–10.16)

## 2025-03-28 PROCEDURE — 85025 COMPLETE CBC W/AUTO DIFF WBC: CPT

## 2025-03-28 PROCEDURE — 83036 HEMOGLOBIN GLYCOSYLATED A1C: CPT

## 2025-03-28 PROCEDURE — 36415 COLL VENOUS BLD VENIPUNCTURE: CPT

## 2025-03-28 PROCEDURE — 80053 COMPREHEN METABOLIC PANEL: CPT

## 2025-03-28 PROCEDURE — 80061 LIPID PANEL: CPT

## 2025-03-31 ENCOUNTER — RESULTS FOLLOW-UP (OUTPATIENT)
Dept: FAMILY MEDICINE CLINIC | Facility: CLINIC | Age: 60
End: 2025-03-31

## 2025-04-01 NOTE — RESULT ENCOUNTER NOTE
Please call the patient, the triglycerides are persistently elevated.  The cholesterol is normal.  The blood sugar, kidney function, liver function are normal.  The hemoglobin, platelets, white blood cells are also normal.    The plan is to exercise more frequently to decrease the triglycerides level.

## 2025-04-21 ENCOUNTER — OFFICE VISIT (OUTPATIENT)
Age: 60
End: 2025-04-21
Payer: COMMERCIAL

## 2025-04-21 DIAGNOSIS — H25.13 SENILE NUCLEAR CATARACT, BILATERAL: Primary | ICD-10-CM

## 2025-04-21 DIAGNOSIS — H47.231 OPTIC CUPPING, RIGHT: ICD-10-CM

## 2025-04-21 DIAGNOSIS — H40.2212 CHRONIC PRIMARY ANGLE-CLOSURE GLAUCOMA OF RIGHT EYE, MODERATE STAGE: ICD-10-CM

## 2025-04-21 DIAGNOSIS — H40.051 OCULAR HYPERTENSION, RIGHT: ICD-10-CM

## 2025-04-21 PROCEDURE — 99204 OFFICE O/P NEW MOD 45 MIN: CPT | Performed by: OPHTHALMOLOGY

## 2025-04-21 PROCEDURE — 92133 CPTRZD OPH DX IMG PST SGM ON: CPT | Performed by: OPHTHALMOLOGY

## 2025-04-21 PROCEDURE — 92020 GONIOSCOPY: CPT | Performed by: OPHTHALMOLOGY

## 2025-04-21 RX ORDER — LATANOPROST 50 UG/ML
1 SOLUTION/ DROPS OPHTHALMIC
Qty: 2.5 ML | Refills: 12 | Status: SHIPPED | OUTPATIENT
Start: 2025-04-21

## 2025-04-21 RX ORDER — TIMOLOL MALEATE 5 MG/ML
SOLUTION/ DROPS OPHTHALMIC
Qty: 5 ML | Refills: 12 | Status: SHIPPED | OUTPATIENT
Start: 2025-04-21

## 2025-04-21 RX ORDER — POLYVINYL ALCOHOL 14 MG/ML
1 SOLUTION/ DROPS OPHTHALMIC AS NEEDED
COMMUNITY

## 2025-04-21 NOTE — PROGRESS NOTES
Name: Lencho Mojica      : 1965      MRN: [id number]  Encounter Provider: Abhinav Moses DO  Encounter Date: 2025   Encounter department: [facility] OPHTHALMOLOGY  :  Assessment & Plan  Senile nuclear cataract, bilateral  -it is VS OD  -plan ce/iol with possible migs od but angle od very narrow  -ce/iol od alone should open angle more    Patient with visually significant cataract of the RIGHT eye that is interfering with activities of daily living.  Reviewed the risks, benefits, and alternatives to cataract surgery of the RIGHTeye.  After discussing with the patient, the patient voiced a desire to proceed with cataract surgery of the RIGHT eye.  We reviewed IOL options and patient desires a monofocal IOL set for distance vision.  Patient understands the need for reading glasses following this procedure and the possible need for distance glasses following this procedure.    It is possible that the pre-existing glaucoma could limit the bcva od after ce/iol.       Optic cupping, right  -this is case of PACG od  -judging from RNFL OCT, suspect at least moderate PACG OD    Orders:  •  OCT, Optic Nerve - OU - Both Eyes  •  timolol (TIMOPTIC) 0.5 % ophthalmic solution; Instill 1 drop in right eye every morning  •  latanoprost (XALATAN) 0.005 % ophthalmic solution; Administer 1 drop to the right eye daily at bedtime    Ocular hypertension, right  -start iop-reducing meds    Plan:  Lv od qam  Lat od qhs  Plan ce/iol od (with possible glaucoma procedure)  Difficult to assess VF with the [facility] prec  Monitor angle os  Will check bcva od (mrx) prior to procedure     Return in about 2 weeks (around 2025) for mrx (to check bcva od); pach; biometry, topography.    Orders:  •  OCT, Optic Nerve - OU - Both Eyes  •  timolol (TIMOPTIC) 0.5 % ophthalmic solution; Instill 1 drop in right eye every morning  •  latanoprost (XALATAN) 0.005 % ophthalmic solution; Administer 1 drop to the right eye daily at  bedtime    Chronic primary angle-closure glaucoma of right eye, moderate stage  -see above         Lencho Mojica is a 59 y.o. male who presents for evaluation of glaucoma.  Possible cataract OD.    Patient reports he was seen about a month ago by a vision center that referred him here today for evaluation.    C/o difficulty reading, even with glasses.  He broke his specs this morning, but reports he hasn't been able to see well with them anyway.  He feels vision OD is worse at night.    History obtained from: patient    Review of Systems  Medical History Reviewed by provider this encounter:     .     Past Ocular History: glaucoma?, cataract?  Ocular Meds/Drops: ATs PRN    HPI     Glaucoma    In right eye.  Vision is blurred. Additional comments: Reports here today for evaluation  Possible cataract           Comments    -blur va od for about a year, [place] at night    POH:  ref error (No CL wear)  POSH:  none  FOH:  glaucoma in brother, sister  OC MEDS:  tears          Last edited by Abhinav Moses DO on 4/21/2025  4:07 PM.          Base Eye Exam     Visual Acuity (Snellen - Linear)       Right Left    Dist cc 20/40-2 (push) 20/20    Dist ph cc NI     Correction: Glasses          Tonometry (Applanation, 4:12 PM)       Right Left    Pressure 22.5 20   Iop after pharm dil with tropic only: OD 24.5, OS 20           Gonioscopy       Right Left    Temporal PTM/SS PTM/SS    Nasal PTM PTM/SS    Superior 0 PTM    Inferior ARUN/PTM PTM   +2-3 PIGM OU           Pupils       Pupils APD    Right PERRL +mild    Left PERRL neg   dc           Visual Fields       Left Right     Full Full          Extraocular Movement       Right Left     Full Full          Neuro/Psych     Oriented x3: Yes    Mood/Affect: Normal          Dilation     Both eyes: 1% Tropicamide @ 4:16 PM            Additional Tests     Keratometry       K1 Axis K2 Axis    Right 43.25 9 44.75 99    Left 43.25 166 44.25 76            Slit Lamp and Fundus Exam      External Exam       Right Left    External Normal Normal          Slit Lamp Exam       Right Left    Lids/Lashes Normal Normal    Conjunctiva/Sclera White and quiet White and quiet    Cornea Clear Clear    Anterior Chamber Deep and quiet Deep and quiet    Iris Round and reactive Round and reactive    Lens +2NSC +1NSC    Anterior Vitreous No PVD, clear, no cell No PVD, clear, no cell          Fundus Exam       Right Left    Disc sharp, no pallor, sup thinner than inf sharp, no pallor    C/D Ratio 0.7 0.5    Macula flat/no heme flat/no heme    Vessels normal in caliber normal in caliber    Periphery flat, no retinal tear or detachment flat, no retinal tear or detachment            Refraction     Wearing Rx       Sphere Cylinder Axis Add    Right -1.00 -0.50 163 +2.00    Left +1.50 Sphere  +2.00    Type: Bifocal          Manifest Refraction (Auto)       Sphere Cylinder Axis    Right -4.00 -1.50 144    Left +2.00 -0.50 144          Final Rx       Sphere Cylinder Dist [facility]    Right +/-  NI    Left +1.25 Sphere 20/20    Expiration Date: 4/21/2026                  IMAGING:  OCT, Optic Nerve - OU - Both Eyes        Right Eye  Reliability was good.     Left Eye  Reliability was good.     Notes  RNFL:    OD: Severe ST and Inf thinning; G65    OS: Normal; G99    GCL:    OD: temp and mitch thinning    OS: wnl

## 2025-04-23 ENCOUNTER — TELEPHONE (OUTPATIENT)
Age: 60
End: 2025-04-23

## 2025-04-23 ENCOUNTER — PREP FOR PROCEDURE (OUTPATIENT)
Age: 60
End: 2025-04-23

## 2025-04-23 DIAGNOSIS — H40.2212 CHRONIC ANGLE-CLOSURE GLAUCOMA OF RIGHT EYE, MODERATE STAGE: ICD-10-CM

## 2025-04-23 DIAGNOSIS — H25.13 NUCLEAR SCLEROTIC CATARACT OF BOTH EYES: Primary | ICD-10-CM

## 2025-04-23 DIAGNOSIS — H40.051 OCULAR HYPERTENSION OF RIGHT EYE: ICD-10-CM

## 2025-04-23 DIAGNOSIS — H47.231 OPTIC CUPPING, RIGHT: ICD-10-CM

## 2025-04-23 NOTE — TELEPHONE ENCOUNTER
Amanda with ophthalmology called in regards to patient needing a pre-op clearance. Amanda stated that patient will be getting surgery June 24.

## 2025-04-23 NOTE — TELEPHONE ENCOUNTER
Pt is scheduled for right eye cataract surgery on 6/24/25.    Medical clearance- PCP office requested to reach out to the pt to arrange directly w him. Pt is aware     Post op scheduled form 6/25 at 930am    *Will make Dr Moses aware to order the post op eye drops to pts pharmacy

## 2025-05-09 ENCOUNTER — OFFICE VISIT (OUTPATIENT)
Age: 60
End: 2025-05-09
Payer: COMMERCIAL

## 2025-05-09 DIAGNOSIS — H40.2212 CHRONIC PRIMARY ANGLE-CLOSURE GLAUCOMA OF RIGHT EYE, MODERATE STAGE: ICD-10-CM

## 2025-05-09 DIAGNOSIS — H25.13 SENILE NUCLEAR CATARACT, BILATERAL: Primary | ICD-10-CM

## 2025-05-09 PROCEDURE — 99212 OFFICE O/P EST SF 10 MIN: CPT | Performed by: OPHTHALMOLOGY

## 2025-05-09 PROCEDURE — 76514 ECHO EXAM OF EYE THICKNESS: CPT | Performed by: OPHTHALMOLOGY

## 2025-05-09 PROCEDURE — 92136 OPHTHALMIC BIOMETRY: CPT | Performed by: OPHTHALMOLOGY

## 2025-05-09 RX ORDER — DIFLUPREDNATE OPHTHALMIC 0.5 MG/ML
EMULSION OPHTHALMIC
Qty: 5 ML | Refills: 6 | Status: SHIPPED | OUTPATIENT
Start: 2025-05-09 | End: 2025-05-09

## 2025-05-09 RX ORDER — PREDNISOLONE ACETATE 10 MG/ML
SUSPENSION/ DROPS OPHTHALMIC
Qty: 5 ML | Refills: 6 | Status: SHIPPED | OUTPATIENT
Start: 2025-05-09

## 2025-05-09 RX ORDER — MOXIFLOXACIN 5 MG/ML
SOLUTION/ DROPS OPHTHALMIC
Qty: 3 ML | Refills: 6 | Status: SHIPPED | OUTPATIENT
Start: 2025-05-09

## 2025-05-09 RX ORDER — ACETAZOLAMIDE 250 MG/1
TABLET ORAL
Qty: 30 TABLET | Refills: 6 | Status: SHIPPED | OUTPATIENT
Start: 2025-05-09

## 2025-05-09 NOTE — PROGRESS NOTES
Name: Lencho Mojica      : 1965      MRN: [id number]  Encounter Provider: Abhinav Moses DO  Encounter Date: 2025   Encounter department: [facility] OPHTHALMOLOGY  :  Assessment & Plan  Senile nuclear cataract, bilateral  -OD>OS  Patient with visually significant cataract of the RIGHT eye that is interfering with activities of daily living.  Reviewed the risks, benefits, and alternatives to cataract surgery of the RIGHTeye.  After discussing with the patient, the patient voiced a desire to proceed with cataract surgery of the RIGHT eye.  We reviewed IOL options and patient desires a monofocal IOL set for distance vision.  Patient understands the need for reading glasses following this procedure and the possible need for distance glasses following this procedure.      Orders:  •  moxifloxacin (VIGAMOX) 0.5 % ophthalmic solution; Instill 1 drop in right eye 4 times a day after your eye procedure  •  IOL Biometry - OU - Both Eyes  •  prednisoLONE acetate (PRED FORTE) 1 % ophthalmic suspension; Instill 1 drop in right eye 4 times a day after eye procedure (shake well)  •  acetaZOLAMIDE (DIAMOX) 250 mg tablet; Take 1 tablet 3 times/day after your eye procedure    Chronic primary angle-closure glaucoma of right eye, moderate stage  -iop slightly improved od after starting marco and lat  -angle is narrow  -cct is thin  -I expect angle to open well after ce/iol   -discussed option of migs; I suspect ce/iol od alone will assist with IOP control  -cpm od  -recheck oct and do vf once healed from ce/iol od     Return for ce/iol od.            Lencho Mojica is a 60 y.o. male who presents for IOP check.      History obtained from: patient    Review of Systems  Medical History Reviewed by provider this encounter:  Meds       HPI    Senile nuclear cataract, bilateral  -it is VS OD  -plan ce/iol with possible migs od but angle od very narrow  -ce/iol od alone should open angle more     Optic cupping,  right  -this is case of PACG od  -judging from RNFL OCT, suspect at least moderate PACG OD     Ocular hypertension, right     Oc med:  Lv od qam - ld 7am  Lat od qhs - ld last pm  Plan ce/iol od (with possible glaucoma procedure)  Difficult to assess VF with the [facility] prec  Monitor angle os  Will check bcva od (mrx) prior to procedure      Return in about 2 weeks (around 5/5/2025) for mrx (to check bcva od); pach; biometry, topography.        Chronic primary angle-closure glaucoma of right eye, moderate stage  -see above        Lencho Mojica is a 59 y.o. male who presents for evaluation of glaucoma.  Possible cataract OD.     Patient reports he was seen about a month ago by a vision center that referred him here today for evaluation.     C/o difficulty reading, even with glasses.  He broke his specs this morning, but reports he hasn't been able to see well with them anyway.  He feels vision OD is worse at night.     History obtained from: patient     Review of Systems  Medical History Reviewed by provider this encounter:     .     Past Ocular History: glaucoma?, cataract?  Ocular Meds/Drops: ATs PRN     HPI       Glaucoma         Last edited by Abhinav Moses DO on 5/9/2025  1:15 PM.             Past Ocular History: Angle closure Glaucoma OD, Cataracts OU.    Ocular Meds/Drops:   Timolol OD morning -7 am  Latanoprost OD bedtime- 10 pm  Base Eye Exam     Visual Acuity (Snellen - Linear)       Right Left    Dist sc 20/150-1 20/50+2    Dist ph sc 20/50 20/25          Tonometry (Applanation, 2:10 PM)       Right Left    Pressure 19 19          Pachymetry (6/22/2025)       Right Left    Thickness 483 499          Pupils       Pupils APD    Right PERRL None    Left PERRL None          Visual Fields       Left Right     Full Full          Extraocular Movement       Right Left     Full Full          Neuro/Psych     Oriented x3: Yes    Mood/Affect: Normal            Slit Lamp and Fundus Exam     External  Exam       Right Left    External Normal Normal          Slit Lamp Exam       Right Left    Lids/Lashes Normal Normal    Conjunctiva/Sclera White and quiet White and quiet    Cornea Clear Clear    Anterior Chamber Deep and quiet Deep and quiet    Iris Round and reactive Round and reactive    Lens +2NSC +1NSC    Anterior Vitreous No PVD, clear, no cell No PVD, clear, no cell          Fundus Exam       Right Left    Disc NO DH             Refraction     Final Rx       Sphere Cylinder Strabane Dist VA    Right -4.00 -1.50 144 20/60    Left        Expiration Date: 5/9/2026                  IMAGING:  IOL Biometry - OU - Both Eyes      Component    A LENGTH (OS)    23.54      A LENGTH (OD)    23.51               Right Eye  Lens style: CNA0T0. Lens power: 22.0. Target refraction: -0.50. Axial length was 23.51.     Left Eye  Lens style: CNA0T0. Lens power: 22.0. Target refraction: -0.50. Axial length was 23.54.     Notes  SEE SCANNED LENSTAR RESULTS

## 2025-05-27 ENCOUNTER — OFFICE VISIT (OUTPATIENT)
Dept: SURGERY | Facility: CLINIC | Age: 60
End: 2025-05-27
Payer: COMMERCIAL

## 2025-05-27 VITALS
TEMPERATURE: 99 F | HEART RATE: 72 BPM | BODY MASS INDEX: 28.71 KG/M2 | WEIGHT: 156 LBS | OXYGEN SATURATION: 98 % | HEIGHT: 62 IN

## 2025-05-27 DIAGNOSIS — R10.84 GENERALIZED ABDOMINAL PAIN: Primary | ICD-10-CM

## 2025-05-27 DIAGNOSIS — Z09 STATUS POST LEFT INGUINAL HERNIA REPAIR, FOLLOW-UP EXAM: ICD-10-CM

## 2025-05-27 DIAGNOSIS — R19.09 MASS OF LEFT INGUINAL REGION: Primary | ICD-10-CM

## 2025-05-27 PROCEDURE — 99214 OFFICE O/P EST MOD 30 MIN: CPT | Performed by: SPECIALIST

## 2025-05-27 NOTE — PROGRESS NOTES
Name: Lencho Mojica      : 1965      MRN: [id number]  Encounter Provider: Martin Wharton MD  Encounter Date: 2025   Encounter department: [facility] SURGERY [facility] [address]  :  Assessment & Plan  Mass of left inguinal region  Recurrent hernia?  Most likely.  Imaging is necessary and we will proceed with a CT scan of the abdomen and pelvis to further define what is going on.  If it is recurrent hernia the question is how to fix it?  Perhaps laparoscopic approach.  We will wait for the imaging results and then discuss the best approach for his problem.       Status post left inguinal hernia repair, follow-up exam             History of Present Illness   HPI  Lencho Mojica is a 59 y.o. male who presents for follow-up visit status post repair of recurrent left inguinal hernia with mesh in 2024.  It was very difficult procedure due to previous surgery and old mesh but it was felt to have been performed adequately and he was seen postoperatively.  Postoperatively he had no pain in the area and was ambulated without difficulty.  He had a little swelling on physical exam but there was no evidence of recurrence.  He was seen by his family physician about a month later for a general physical exam.  At that time he was found to have some swelling over the area and some discomfort.  He had some other health issues that need to be taken care of but he was referred back to our office in regards to this.  He denies any GI component no nausea vomiting diarrhea constipation.  He complains of a left inguinal bulge and tenderness with pressure.  He also complains of bilateral lower extremity pain and lower back pain.  He said these are new symptoms.      Review of Systems   Constitutional:  Negative for chills and fever.   HENT:  Negative for ear pain and sore throat.    Eyes:  Negative for pain and visual disturbance.   Respiratory:  Negative for cough and shortness of breath.   "  Cardiovascular:  Negative for chest pain and palpitations.   Gastrointestinal:  Negative for abdominal pain and vomiting.   Genitourinary:  Negative for dysuria and hematuria.   Musculoskeletal:  Negative for arthralgias and back pain.   Skin:  Negative for color change and rash.   Neurological:  Negative for seizures and syncope.   All other systems reviewed and are negative.    Past Medical History   Past Medical History[1]  Past Surgical History[2]  Family History[3]   reports that he has never smoked. He has never used smokeless tobacco. He reports current alcohol use. He reports that he does not use drugs.  Current Outpatient Medications   Medication Instructions    acetaZOLAMIDE (DIAMOX) 250 mg tablet Take 1 tablet 3 times/day after your eye procedure    latanoprost (XALATAN) 0.005 % ophthalmic solution 1 drop, Right Eye, Daily at bedtime    moxifloxacin (VIGAMOX) 0.5 % ophthalmic solution Instill 1 drop in right eye 4 times a day after your eye procedure    polyvinyl alcohol (LIQUIFILM TEARS) 1.4 % ophthalmic solution 1 drop, As needed    prednisoLONE acetate (PRED FORTE) 1 % ophthalmic suspension Instill 1 drop in right eye 4 times a day after eye procedure (shake well)    sildenafil (VIAGRA) 100 mg, Oral, Daily PRN    tadalafil (CIALIS) 20 mg, Oral, Daily PRN    timolol (TIMOPTIC) 0.5 % ophthalmic solution Instill 1 drop in right eye every morning   Allergies[4]      Objective   Pulse 72   Temp 99 °F (37.2 °C) (Tympanic)   Ht 5' 2\" (1.575 m)   Wt 70.8 kg (156 lb)   SpO2 98%   BMI 28.53 kg/m²      Physical Exam  Vitals and nursing note reviewed.   Constitutional:       General: He is not in acute distress.     Appearance: He is well-developed.   HENT:      Head: Normocephalic and atraumatic.     Eyes:      Conjunctiva/sclera: Conjunctivae normal.       Cardiovascular:      Rate and Rhythm: Normal rate and regular rhythm.      Heart sounds: No murmur heard.  Pulmonary:      Effort: Pulmonary effort is " normal. No respiratory distress.      Breath sounds: Normal breath sounds.   Abdominal:      Palpations: Abdomen is soft.      Tenderness: There is no abdominal tenderness.      Comments: Old scars left groin noted.  Tender bulge left groin that is partially reducible but may be consistent with a recurrent left inguinal hernia.     Musculoskeletal:         General: No swelling.      Cervical back: Neck supple.     Skin:     General: Skin is warm and dry.      Capillary Refill: Capillary refill takes less than 2 seconds.     Neurological:      Mental Status: He is alert.     Psychiatric:         Mood and Affect: Mood normal.                [1]   Past Medical History:  Diagnosis Date    Cancer (HCC)     prostate    COVID 2020    Elevated PSA     Hepatic steatosis     Hepatomegaly     High cholesterol     diet controlled    Neck pain     takes robaxin prn   [2]   Past Surgical History:  Procedure Laterality Date    HERNIA REPAIR      [facility] BX PROSTATE STRTCTC SATURATION SAMPLING IMG GID N/A 05/16/2023    Procedure: TRANSPERINEAL MRI FUSION BIOPSY PROSTATE;  Surgeon: Anna Traore MD;  Location: [facility];  Service: Urology    VA LAPS SURG [id number] RPBIC RAD W/NRV SPARING ROBOT N/A 07/10/2023    Procedure: ROBOTIC RADICAL PROSTATECTOMY, PELVIC LYMPH NODE DISSECTION;  Surgeon: Marcio Sosa MD;  Location: [facility];  Service: Urology    VA RPR RECRT INGUINAL HERNIA ANY AGE REDUCIBLE Left 12/16/2024    Procedure: REPAIR RECURRENT LEFT INGUINAL HERNIA WITH MESH;  Surgeon: Martin Wharton MD;  Location:  MAIN OR;  Service: General    PROSTATE BIOPSY  08/2021    benign    PROSTATE SURGERY      VARICOSE VEIN SURGERY     [3]   Family History  Problem Relation Name Age of Onset    Cancer Mother      Glaucoma Brother     [4] No Known Allergies

## 2025-05-27 NOTE — LETTER
May 27, 2025     Ankit Gonzalez MD  [address] [place] [address]  [address]  [facility] [address]    Patient: Lencho Mojica   YOB: 1965   Date of Visit: 2025       Dear Ankit,    Thank you for referring Lencho Mojica to me for evaluation. Below are my notes for this consultation.    If you have questions, please do not hesitate to call me. I look forward to following your patient along with you.         Sincerely,    King Martin Wharton MD        CC: No Recipients    Martin Wharton MD  2025  4:53 PM  Sign when Signing Visit  Name: Lencho Mojica      : 1965      MRN: [id number]  Encounter Provider: Martin Wharton MD  Encounter Date: 2025   Encounter department: [facility] GENERAL SURGERY [facility] [address]  :  Assessment & Plan  Mass of left inguinal region  Recurrent hernia?  Most likely.  Imaging is necessary and we will proceed with a CT scan of the abdomen and pelvis to further define what is going on.  If it is recurrent hernia the question is how to fix it?  Perhaps laparoscopic approach.  We will wait for the imaging results and then discuss the best approach for his problem.       Status post left inguinal hernia repair, follow-up exam             History of Present Illness  HPI  Lencho Mojica is a 59 y.o. male who presents for follow-up visit status post repair of recurrent left inguinal hernia with mesh in 2024.  It was very difficult procedure due to previous surgery and old mesh but it was felt to have been performed adequately and he was seen postoperatively.  Postoperatively he had no pain in the area and was ambulated without difficulty.  He had a little swelling on physical exam but there was no evidence of recurrence.  He was seen by his family physician about a month later for a general physical exam.  At that time he was found to have some swelling over the area and some discomfort.  He had some other health issues that need to be  taken care of but he was referred back to our office in regards to this.  He denies any GI component no nausea vomiting diarrhea constipation.  He complains of a left inguinal bulge and tenderness with pressure.  He also complains of bilateral lower extremity pain and lower back pain.  He said these are new symptoms.      Review of Systems   Constitutional:  Negative for chills and fever.   HENT:  Negative for ear pain and sore throat.    Eyes:  Negative for pain and visual disturbance.   Respiratory:  Negative for cough and shortness of breath.    Cardiovascular:  Negative for chest pain and palpitations.   Gastrointestinal:  Negative for abdominal pain and vomiting.   Genitourinary:  Negative for dysuria and hematuria.   Musculoskeletal:  Negative for arthralgias and back pain.   Skin:  Negative for color change and rash.   Neurological:  Negative for seizures and syncope.   All other systems reviewed and are negative.    Past Medical History  Past Medical History[1]  Past Surgical History[2]  Family History[3]   reports that he has never smoked. He has never used smokeless tobacco. He reports current alcohol use. He reports that he does not use drugs.  Current Outpatient Medications   Medication Instructions   • acetaZOLAMIDE (DIAMOX) 250 mg tablet Take 1 tablet 3 times/day after your eye procedure   • latanoprost (XALATAN) 0.005 % ophthalmic solution 1 drop, Right Eye, Daily at bedtime   • moxifloxacin (VIGAMOX) 0.5 % ophthalmic solution Instill 1 drop in right eye 4 times a day after your eye procedure   • polyvinyl alcohol (LIQUIFILM TEARS) 1.4 % ophthalmic solution 1 drop, As needed   • prednisoLONE acetate (PRED FORTE) 1 % ophthalmic suspension Instill 1 drop in right eye 4 times a day after eye procedure (shake well)   • sildenafil (VIAGRA) 100 mg, Oral, Daily PRN   • tadalafil (CIALIS) 20 mg, Oral, Daily PRN   • timolol (TIMOPTIC) 0.5 % ophthalmic solution Instill 1 drop in right eye every morning  "  Allergies[4]      Objective  Pulse 72   Temp 99 °F (37.2 °C) (Tympanic)   Ht 5' 2\" (1.575 m)   Wt 70.8 kg (156 lb)   SpO2 98%   BMI 28.53 kg/m²      Physical Exam  Vitals and nursing note reviewed.   Constitutional:       General: He is not in acute distress.     Appearance: He is well-developed.   HENT:      Head: Normocephalic and atraumatic.     Eyes:      Conjunctiva/sclera: Conjunctivae normal.       Cardiovascular:      Rate and Rhythm: Normal rate and regular rhythm.      Heart sounds: No murmur heard.  Pulmonary:      Effort: Pulmonary effort is normal. No respiratory distress.      Breath sounds: Normal breath sounds.   Abdominal:      Palpations: Abdomen is soft.      Tenderness: There is no abdominal tenderness.      Comments: Old scars left groin noted.  Tender bulge left groin that is partially reducible but may be consistent with a recurrent left inguinal hernia.     Musculoskeletal:         General: No swelling.      Cervical back: Neck supple.     Skin:     General: Skin is warm and dry.      Capillary Refill: Capillary refill takes less than 2 seconds.     Neurological:      Mental Status: He is alert.     Psychiatric:         Mood and Affect: Mood normal.                [1]   Past Medical History:  Diagnosis Date   • Cancer (HCC)     prostate   • COVID 2020   • Elevated PSA    • Hepatic steatosis    • Hepatomegaly    • High cholesterol     diet controlled   • Neck pain     takes robaxin prn   [2]   Past Surgical History:  Procedure Laterality Date   • HERNIA REPAIR     • [place] BX PROSTATE STRTCTC SATURATION SAMPLING IMG GID N/A 05/16/2023    Procedure: TRANSPERINEAL MRI FUSION BIOPSY PROSTATE;  Surgeon: Anna Traore MD;  Location[facility];  Service: Urology   • [place] LAPS SURG [id number] RPBIC RAD W/NRV SPARING ROBOT N/A 07/10/2023    Procedure: ROBOTIC RADICAL PROSTATECTOMY, PELVIC LYMPH NODE DISSECTION;  Surgeon: Marcio Sosa MD;  [facility];  Service: Urology   • [place] RPR RECRT " INGUINAL HERNIA ANY AGE REDUCIBLE Left 12/16/2024    Procedure: REPAIR RECURRENT LEFT INGUINAL HERNIA WITH MESH;  Surgeon: Martin Wharton MD;  Location:  MAIN OR;  Service: General   • PROSTATE BIOPSY  08/2021    benign   • PROSTATE SURGERY     • VARICOSE VEIN SURGERY     [3]   Family History  Problem Relation Name Age of Onset   • Cancer Mother     • Glaucoma Brother     [4] No Known Allergies

## 2025-05-29 ENCOUNTER — APPOINTMENT (OUTPATIENT)
Dept: LAB | Facility: HOSPITAL | Age: 60
End: 2025-05-29
Payer: COMMERCIAL

## 2025-05-29 ENCOUNTER — CONSULT (OUTPATIENT)
Dept: FAMILY MEDICINE CLINIC | Facility: CLINIC | Age: 60
End: 2025-05-29
Payer: COMMERCIAL

## 2025-05-29 VITALS
SYSTOLIC BLOOD PRESSURE: 110 MMHG | BODY MASS INDEX: 28.89 KG/M2 | OXYGEN SATURATION: 97 % | WEIGHT: 157 LBS | HEART RATE: 72 BPM | HEIGHT: 62 IN | DIASTOLIC BLOOD PRESSURE: 70 MMHG | RESPIRATION RATE: 16 BRPM | TEMPERATURE: 97.8 F

## 2025-05-29 DIAGNOSIS — R10.9 INTESTINAL CRAMPS: ICD-10-CM

## 2025-05-29 DIAGNOSIS — Z01.810 PREOPERATIVE CARDIOVASCULAR EXAMINATION: Primary | ICD-10-CM

## 2025-05-29 DIAGNOSIS — Z01.810 PREOPERATIVE CARDIOVASCULAR EXAMINATION: ICD-10-CM

## 2025-05-29 DIAGNOSIS — F41.8 OTHER SPECIFIED ANXIETY DISORDERS: ICD-10-CM

## 2025-05-29 DIAGNOSIS — N52.31 ERECTILE DYSFUNCTION AFTER RADICAL PROSTATECTOMY: ICD-10-CM

## 2025-05-29 LAB
ALBUMIN SERPL BCG-MCNC: 4.5 G/DL (ref 3.5–5)
ALP SERPL-CCNC: 63 U/L (ref 34–104)
ALT SERPL W P-5'-P-CCNC: 45 U/L (ref 7–52)
ANION GAP SERPL CALCULATED.3IONS-SCNC: 9 MMOL/L (ref 4–13)
AST SERPL W P-5'-P-CCNC: 21 U/L (ref 13–39)
BASOPHILS # BLD AUTO: 0.06 THOUSANDS/ÂΜL (ref 0–0.1)
BASOPHILS NFR BLD AUTO: 1 % (ref 0–1)
BILIRUB SERPL-MCNC: 0.36 MG/DL (ref 0.2–1)
BUN SERPL-MCNC: 18 MG/DL (ref 5–25)
CALCIUM SERPL-MCNC: 9.3 MG/DL (ref 8.4–10.2)
CHLORIDE SERPL-SCNC: 104 MMOL/L (ref 96–108)
CO2 SERPL-SCNC: 25 MMOL/L (ref 21–32)
CREAT SERPL-MCNC: 0.78 MG/DL (ref 0.6–1.3)
EOSINOPHIL # BLD AUTO: 0.57 THOUSAND/ÂΜL (ref 0–0.61)
EOSINOPHIL NFR BLD AUTO: 6 % (ref 0–6)
ERYTHROCYTE [DISTWIDTH] IN BLOOD BY AUTOMATED COUNT: 12.4 % (ref 11.6–15.1)
GFR SERPL CREATININE-BSD FRML MDRD: 99 ML/MIN/1.73SQ M
GLUCOSE SERPL-MCNC: 95 MG/DL (ref 65–140)
HCT VFR BLD AUTO: 43.1 % (ref 36.5–49.3)
HGB BLD-MCNC: 14.9 G/DL (ref 12–17)
IMM GRANULOCYTES # BLD AUTO: 0.08 THOUSAND/UL (ref 0–0.2)
IMM GRANULOCYTES NFR BLD AUTO: 1 % (ref 0–2)
LYMPHOCYTES # BLD AUTO: 2.62 THOUSANDS/ÂΜL (ref 0.6–4.47)
LYMPHOCYTES NFR BLD AUTO: 28 % (ref 14–44)
MCH RBC QN AUTO: 30.5 PG (ref 26.8–34.3)
MCHC RBC AUTO-ENTMCNC: 34.6 G/DL (ref 31.4–37.4)
MCV RBC AUTO: 88 FL (ref 82–98)
MONOCYTES # BLD AUTO: 0.63 THOUSAND/ÂΜL (ref 0.17–1.22)
MONOCYTES NFR BLD AUTO: 7 % (ref 4–12)
NEUTROPHILS # BLD AUTO: 5.55 THOUSANDS/ÂΜL (ref 1.85–7.62)
NEUTS SEG NFR BLD AUTO: 57 % (ref 43–75)
NRBC BLD AUTO-RTO: 0 /100 WBCS
PLATELET # BLD AUTO: 297 THOUSANDS/UL (ref 149–390)
PMV BLD AUTO: 9.9 FL (ref 8.9–12.7)
POTASSIUM SERPL-SCNC: 4 MMOL/L (ref 3.5–5.3)
PROT SERPL-MCNC: 7.2 G/DL (ref 6.4–8.4)
PSA SERPL-MCNC: <0.008 NG/ML (ref 0–4)
RBC # BLD AUTO: 4.88 MILLION/UL (ref 3.88–5.62)
SODIUM SERPL-SCNC: 138 MMOL/L (ref 135–147)
WBC # BLD AUTO: 9.51 THOUSAND/UL (ref 4.31–10.16)

## 2025-05-29 PROCEDURE — 99214 OFFICE O/P EST MOD 30 MIN: CPT | Performed by: FAMILY MEDICINE

## 2025-05-29 PROCEDURE — 80053 COMPREHEN METABOLIC PANEL: CPT

## 2025-05-29 PROCEDURE — 85025 COMPLETE CBC W/AUTO DIFF WBC: CPT

## 2025-05-29 RX ORDER — L.ACIDOPH/L.BULG/B.BIF/S.THERM 1B-250 MG
1 TABLET ORAL 2 TIMES DAILY
Qty: 60 TABLET | Refills: 5 | Status: SHIPPED | OUTPATIENT
Start: 2025-05-29

## 2025-05-29 RX ORDER — SILDENAFIL 100 MG/1
100 TABLET, FILM COATED ORAL DAILY PRN
Qty: 30 TABLET | Refills: 3 | Status: SHIPPED | OUTPATIENT
Start: 2025-05-29

## 2025-05-29 RX ORDER — TADALAFIL 20 MG/1
20 TABLET ORAL DAILY PRN
Qty: 15 TABLET | Refills: 3 | Status: SHIPPED | OUTPATIENT
Start: 2025-05-29

## 2025-05-29 NOTE — PROGRESS NOTES
Name: Lencho Mojica      : 1965      MRN: [id number]  Encounter Provider: Ankit Gonzalez MD  Encounter Date: 2025   Encounter department: [facility]    Assessment & Plan  Preoperative cardiovascular examination  After exam patient and reviewing labs and EKG, I found patient medically cleared for surgery.        Erectile dysfunction after radical prostatectomy  We discussed about causes of Erectile dysfunction. encourage to avoid the  use cocaine, heroin, or other illegal drugs.   Some medicines can cause erection problems. Also, some medicines can have dangerous interactions with medicines that are prescribed for ED, including over-the-counter medicines and herbal products   Reduce stress by increasing amount of exercise in your daily life.        Orders:    tadalafil (CIALIS) 20 MG tablet; Take 1 tablet (20 mg total) by mouth daily as needed for erectile dysfunction    sildenafil (VIAGRA) 100 mg tablet; Take 1 tablet (100 mg total) by mouth daily as needed for erectile dysfunction    Intestinal cramps  I explained to the patient that in patients who have symptoms of irritable bowel syndrome (IBS), the differential diagnosis includes celiac sprue, microscopic and collagenous colitis, atypical Crohn’s disease for patients with diarrhea-predominant IBS, and chronic constipation (without pain) for those with constipation-predominant IBS. If there are no warning signs, laboratory testing is warranted if indicated by the history.  The current symptoms are benign and are treated on a needed basis.   Orders:    Probiotic TABS; Take 1 tablet by mouth in the morning and 1 tablet before bedtime.    Other specified anxiety disorders  For the past 6 months he has Excessive anxiety and worry regarding several issues are present most of the time for more than 6 a month, blaming the cancer diagnosis.  Difficulty controlling worry.  Restlessness or feeling on  edge  Easily fatigued  Difficulty concentrating  Irritability  Muscle tension  Sleep disturbance (difficulty falling or staying asleep, or restless sleep).  Anxiety causes significant distress or impairment in social, occupational, or other important areas of functioning.  There is not another mental disorder (e.g., worries about having a panic attack in panic disorder), worries about embarrassment in public (social phobia), fear of contamination (obsessive-compulsive disorder), separation anxiety; fear of gaining weight (anorexia nervosa), multiple physical complaints (somatization disorder), fear of a serious illness (hypochondriasis), or exclusively due to PTSD.  The present episode of anxiety  is not attributable to the physiological effects of a substance or another medical condition.    Orders:    Ambulatory Referral to Psych Services; Future       History of Present Illness  The patient presents for evaluation of cataract, erectile dysfunction, neck pain, and gastrointestinal issues.    He reports significant visual disturbances, particularly at night, progressively worsening. Cataract in the right eye, surgery scheduled for 06/24/2025. Not informed about preoperative blood work. Despite corrective lenses, perceives a defect in the right lens, leading to blurred vision. Left eye functions normally.    Desires mental health consultation due to concerns about upcoming prostate surgery. Discontinued injections due to severe pain. Prescribed Viagra and Cialis, beneficial but supply exhausted. Seeking refill.    Reports neck pain attributed to stress, persistent, unresponsive to Tylenol.    Reports unpleasant stomach odor, suspects need for gastric cleansing. Maintains healthy diet, avoids fried foods, consumes eggs for breakfast.    Reports excessive snoring and sleep apnea episodes.        No Known Allergies    Problem List[1]    Current Medications[2]    /70 (BP Location: Left arm, Patient Position:  "Sitting, Cuff Size: Standard)   Pulse 72   Temp 97.8 °F (36.6 °C) (Tympanic)   Resp 16   Ht 5' 2\" (1.575 m)   Wt 71.2 kg (157 lb)   SpO2 97%   BMI 28.72 kg/m²     Physical Exam  General Appearance: Normal.  HEENT: Cataract in right eye.  Respiratory: Lungs clear through the auscultation.  Cardiovascular: Heart has Normal rhythm without murmurs.  Abdomen: Protuberant, obese, Normal bowel sound, soft to palpation, negative for organomegaly or tenderness.  Lymphatic: No abnormal lymph nodes found.  Back, Musculoskeletal: Negative for abnormalities.  Extremities: No edema in the LEs.  Skin: Warm and dry, no rash.  Neurological: Oriented X3, there is no focal deficit.  Psychiatric: No abnormalities.               [1]   Patient Active Problem List  Diagnosis    Gastroesophageal reflux disease with esophagitis without hemorrhage    Pure hypertriglyceridemia    Hepatic steatosis    History of left inguinal hernia repair    Prostate cancer 2023(HCC)    Cervicalgia    Elevated transaminase level    Overweight (BMI 25.0-29.9)    Stress incontinence    Erectile dysfunction following prostate ablative therapy    Recurrent hernia    Postprocedural stricture of anterior urethra    Pre-diabetes   [2]   Current Outpatient Medications:     Probiotic TABS, Take 1 tablet by mouth in the morning and 1 tablet before bedtime., Disp: 60 tablet, Rfl: 5    sildenafil (VIAGRA) 100 mg tablet, Take 1 tablet (100 mg total) by mouth daily as needed for erectile dysfunction, Disp: 30 tablet, Rfl: 3    tadalafil (CIALIS) 20 MG tablet, Take 1 tablet (20 mg total) by mouth daily as needed for erectile dysfunction, Disp: 15 tablet, Rfl: 3    acetaZOLAMIDE (DIAMOX) 250 mg tablet, Take 1 tablet 3 times/day after your eye procedure, Disp: 30 tablet, Rfl: 6    latanoprost (XALATAN) 0.005 % ophthalmic solution, Administer 1 drop to the right eye daily at bedtime, Disp: 2.5 mL, Rfl: 12    moxifloxacin (VIGAMOX) 0.5 % ophthalmic solution, Instill 1 " drop in right eye 4 times a day after your eye procedure, Disp: 3 mL, Rfl: 6    polyvinyl alcohol (LIQUIFILM TEARS) 1.4 % ophthalmic solution, 1 drop as needed for dry eyes, Disp: , Rfl:     prednisoLONE acetate (PRED FORTE) 1 % ophthalmic suspension, Instill 1 drop in right eye 4 times a day after eye procedure (shake well), Disp: 5 mL, Rfl: 6    timolol (TIMOPTIC) 0.5 % ophthalmic solution, Instill 1 drop in right eye every morning, Disp: 5 mL, Rfl: 12

## 2025-05-30 ENCOUNTER — TELEPHONE (OUTPATIENT)
Dept: FAMILY MEDICINE CLINIC | Facility: CLINIC | Age: 60
End: 2025-05-30

## 2025-05-30 PROCEDURE — 93000 ELECTROCARDIOGRAM COMPLETE: CPT | Performed by: FAMILY MEDICINE

## 2025-05-30 NOTE — TELEPHONE ENCOUNTER
Ankit Gonzalez MD  [facility]  Please fax note, EKG and labs to patient's eye Doctor. Ask patient information. I don't have a form. Thank you

## 2025-06-02 ENCOUNTER — TELEPHONE (OUTPATIENT)
Dept: FAMILY MEDICINE CLINIC | Facility: CLINIC | Age: 60
End: 2025-06-02

## 2025-06-02 ENCOUNTER — TELEPHONE (OUTPATIENT)
Age: 60
End: 2025-06-02

## 2025-06-11 ENCOUNTER — ANESTHESIA EVENT (OUTPATIENT)
Dept: PERIOP | Facility: AMBULARY SURGERY CENTER | Age: 60
End: 2025-06-11
Payer: COMMERCIAL

## 2025-06-12 ENCOUNTER — HOSPITAL ENCOUNTER (OUTPATIENT)
Dept: RADIOLOGY | Facility: HOSPITAL | Age: 60
Discharge: HOME | End: 2025-06-12
Attending: SPECIALIST
Payer: COMMERCIAL

## 2025-06-12 DIAGNOSIS — R10.84 GENERALIZED ABDOMINAL PAIN: ICD-10-CM

## 2025-06-12 PROCEDURE — 74177 CT ABD & PELVIS W/CONTRAST: CPT

## 2025-06-12 RX ADMIN — IOHEXOL 75 ML: 350 INJECTION, SOLUTION INTRAVENOUS at 10:09

## 2025-06-16 ENCOUNTER — TELEPHONE (OUTPATIENT)
Age: 60
End: 2025-06-16

## 2025-06-16 NOTE — TELEPHONE ENCOUNTER
Contacted patient in regards to ROUTINE Referral. Spoke with patient who confirmed the following:    Services Needed: TT (wants afternoon appts)  Verify Insurance Info: [employer]

## 2025-06-19 ENCOUNTER — TELEPHONE (OUTPATIENT)
Age: 60
End: 2025-06-19

## 2025-06-19 NOTE — TELEPHONE ENCOUNTER
[facility]  Exam    ASSESSMENT & PLAN  Yan Casillas is a 6 days male who has normal growth and normal development.    Diagnoses and all orders for this visit:    Health supervision for  under 8 days old  Continue with breast-feeding every 2-3 hours or more frequently based on infant cues.  Mother declines any lactation support at this time.  Adequate weight gain in the last 2 days.  He is still down -5% birthweight.  Recommended weight check in 1 week.  Infant is voiding and stooling adequately.  Very mild facial jaundice noted on exam today.  Likely physiologic etiology as infant is gaining adequate weight and does not show any signs of increasing jaundice.      Vitamin D discussed, Lactation Referral and 1 week for weight check.    Immunization History   Administered Date(s) Administered     Hep B, Peds or Adolescent 2020       ANTICIPATORY GUIDANCE  I have reviewed age appropriate anticipatory guidance.  Social:  Postpartum Fatigue/Depression  Parenting:  Sleep Habits and Respond to Cry/Colic  Nutrition:  Needs No Solid Food and Breastfeeding  Play and Communication:  Bright Pictures, Music, Mobiles, Sound and Voices  Health:  Dressing, Taking Temperature, Nails, Rashes, Diaper Care, Hygiene, Bulb Syringe, Vaporizer, Skin Care, Immunizations and No Honey  Safety:  Car Seat , Falls, Safe Crib and Shaking Baby    HEALTH HISTORY   Do you have any concerns that you'd like to discuss today?: No concerns   No jaundice history with older brother.    Baby is breast-feeding every 2 hours. Sometimes cluster feeds. Mom can hear swallows. Mom reports has come in 2 nights ago. Infant breast-feeds for about 5-10 minutes on each side. He has about 6-7 wet diapers per day. He has about 4 stools and yellow in color. No spit-ups.  He is waking up on his own for feeds. He is down -5% from birthweight.     Infant born at 38 weeks and 3 days gestation via vaginal delivery.  GBS positive, mother was treated  LAUREN Alegre called to inform Dr. Wharton's office that findings and scans are in the PT's chart/epic. - Thank you    prophylactically.  Maternal labs include A+, rubella immune, hepatitis B-, RPR nonreactive, HIV nonreactive.  Infant's Apgar scores was 7, 9.  Infant passed hearing and CCHD screens.  TCB at discharge was 4.9.  Hyperbilirubinemia risk factors include breast-feeding.    Infant's birth weight was 8 pounds 13 ounces  Weight on 2020 was 8 pounds 4 ounces.  Weight today is 8 pounds 6 ounces.  He is up 2 ounces in the last 2 days which is adequate weight gain at this age.    Accompanied by Mother        Do you have any significant health concerns in your family history?: No  Family History   Problem Relation Age of Onset     Hypertension Mother         Copied from mother's history at birth     Mental illness Mother         Copied from mother's history at birth     Has a lack of transportation kept you from medical appointments?: No    Who lives in your home?:  Mother, Father, Brother  Social History     Social History Narrative     Not on file     Do you have any concerns about losing your housing?: No  Is your housing safe and comfortable?: Yes    What does your child eat?: Breast: every 1-2 hours for 5-10 min/side  Is your child spitting up?: No  Have you been worried that you don't have enough food?: No    Sleep:  How many times does your child wake in the night?: 4-5   In what position does your baby sleep:  back  Where does your baby sleep?:  bassinet    Elimination:  Do you have any concerns about your child's bowels or bladder (peeing, pooping, constipation?):  No  How many dirty diapers does your child have a day?:  4  How many wet diapers does your child have a day?:  6    TB Risk Assessment:  Has your child had any of the following?:  no known risk of TB    VISION/HEARING  Do you have any concerns about your child's hearing?  No  Do you have any concerns about your child's vision?  No    DEVELOPMENT  Milestones (by observation/ exam/ report) 75-90% ile   PERSONAL/ SOCIAL/COGNITIVE:    Sustains periods of  "wakefulness for feeding    Makes brief eye contact with adult when held  LANGUAGE:    Cries with discomfort    Calms to adult's voice  GROSS MOTOR:    Lifts head briefly when prone    Kicks/equal movements  FINE MOTOR/ ADAPTIVE:    Keeps hands in a fist     SCREENING RESULTS:   Hearing Screen:   Hearing Screening Results - Right Ear: Pass   Hearing Screening Results - Left Ear: Pass     CCHD Screen:   Right upper extremity -  Oxygen Saturation in Blood Preductal by Pulse Oximetry: 99 %   Lower extremity -  Oxygen Saturation in Blood Postductal by Pulse Oximetry: 98 %   CCHD Interpretation - PASSED     Transcutaneous Bilirubin:   Transcutaneous Bili: 4.9 (2020  8:00 PM)     Metabolic Screen:   Has the initial  metabolic screen been completed?: Yes     Screening Results      metabolic       Hearing         Patient Active Problem List   Diagnosis     Term , current hospitalization     [place] of maternal carrier of group B Streptococcus, mother treated prophylactically         MEASUREMENTS    Length:  22\" (55.9 cm) (>99 %, Z= 2.73, Source: WHO (Boys, 0-2 years))  Weight: 8 lb 6 oz (3.799 kg) (70 %, Z= 0.51, Source: WHO (Boys, 0-2 years))  Birth Weight Change:  -5%  OFC: 36 cm (14.17\") (80 %, Z= 0.86, Source: WHO (Boys, 0-2 years))    Birth History     Birth     Length: 22\" (55.9 cm)     Weight: 8 lb 13 oz (3.997 kg)     HC 36.2 cm (14.25\")     Apgar     One: 7.0     Five: 9.0     Delivery Method: Vaginal, Spontaneous     Gestation Age: 38 3/7 wks     Duration of Labor: 1st: 3h 20m / 2nd: 6m       PHYSICAL EXAM  Physical Exam:      General: Alert, in no acute distress; strong cry  Head: Sutures approximated. Anterior and posterior fontanelles are soft and flat. Hair and scalp normal.  Eyes:   Bilateral red reflexes present. No eye drainage. Conjunctiva normal bilaterally. Sclera normal.   Ears: External ears symmetrical without abnormalities. Bilateral pinna well-formed. Canals " patent.   Nose: Patent nares. No active nasal congestion. No nasal flaring.  Mouth: Lips pink. Oral mucosa moist. Tongue midline (good lateralization, movement, and lift; able to extend pass lower gumline).  Palate intact. Coordinated suck.  Neck: Supple. Bilateral clavicles intact. No palpable masses.  Lungs: Clear to auscultation bilaterally. No wheezing, crackles, or rhonchi. No retractions. Good air entry.   CV: Normal S1 & S2 with regular rate and rhythm.  No murmur present.  Femoral and brachial pulses 2+ bilaterally. Good perfusion.   Abd: Soft, nontender, nondistended, no masses or hepatosplenomegaly. Umbilicus dry. No periumbilical swelling, erythema, tenderness, or drainage. No umbilical hernia.  MSK: Normal Ortolani & Stroud. Symmetric skin folds. Symmetrical movements of bilateral upper and lower extremities.  : Uncircumcised. Bilateral testicles descended. No hydrocele. No hernia.  Skin: No abnormal pigmentation. No rash. Very mild facial jaundice.  Neuro: Normal tone, symmetric reflexes    Crista Steiner, APRN, CPNP, IBCLC  [facility]  [facility]  2020, 10:20 AM

## 2025-06-20 NOTE — PRE-PROCEDURE INSTRUCTIONS
Pre-Surgery Instructions:   Medication Instructions    latanoprost (XALATAN) 0.005 % ophthalmic solution Uses PRN- OK to take day of surgery    polyvinyl alcohol (LIQUIFILM TEARS) 1.4 % ophthalmic solution Uses PRN- DO NOT take day of surgery    Probiotic TABS Stop taking 7 days prior to surgery.    sildenafil (VIAGRA) 100 mg tablet Hold day of surgery.    tadalafil (CIALIS) 20 MG tablet Hold day of surgery.   Medication instructions for day of surgery reviewed. Please take all instructed medications with only a sip of water. Please do not take any over the counter (non-prescribed) vitamins or supplements for one week prior to date of surgery.      You will receive a call one business day prior to surgery with an arrival time and hospital directions. If your surgery is scheduled on a Monday, the hospital will be calling you on the Friday prior to your surgery. If you have not heard from anyone by 8pm, please call the hospital supervisor through the hospital  at [phone]. ([place] [phone] or [place] [phone]).    Do not eat or drink anything after midnight the night before your surgery, including candy, mints, lifesavers, or chewing gum. Do not drink alcohol 24hrs before your surgery. Try not to smoke at least 24hrs before your surgery.       Follow the pre surgery showering instructions as listed in the “My Surgical Experience Booklet” or otherwise provided by your surgeon's office. Do not use a blade to shave the surgical area 1 week before surgery. It is okay to use a clean electric clippers up to 24 hours before surgery. Do not apply any lotions, creams, including makeup, cologne, deodorant, or perfumes after showering on the day of your surgery. Do not use dry shampoo, hair spray, hair gel, or any type of hair products.     No contact lenses, eye make-up, or artificial eyelashes. Remove nail polish, including gel polish, and any artificial, gel, or acrylic nails if possible. Remove all  jewelry including rings and body piercing jewelry.     Wear causal clothing that is easy to take on and off. Consider your type of surgery.    Keep any valuables, jewelry, piercings at home. Please bring any specially ordered equipment (sling, braces) if indicated.    Arrange for a responsible person to drive you to and from the hospital on the day of your surgery. Please confirm the visitor policy for the day of your procedure when you receive your phone call with an arrival time.     Call the surgeon's office with any new illnesses, exposures, or additional questions prior to surgery.    Please reference your “My Surgical Experience Booklet” for additional information to prepare for your upcoming surgery.

## 2025-06-22 ENCOUNTER — PREP FOR PROCEDURE (OUTPATIENT)
Age: 60
End: 2025-06-22

## 2025-06-22 LAB
A LENGTH (OD): 23.51
A LENGTH (OS): 23.54

## 2025-06-22 RX ORDER — PROPARACAINE HYDROCHLORIDE 5 MG/ML
1 SOLUTION/ DROPS OPHTHALMIC ONCE
Status: CANCELLED | OUTPATIENT
Start: 2025-06-22 | End: 2025-06-22

## 2025-06-22 RX ORDER — CYCLOPENTOLATE HYDROCHLORIDE 10 MG/ML
1 SOLUTION/ DROPS OPHTHALMIC
Status: CANCELLED | OUTPATIENT
Start: 2025-06-22 | End: 2025-06-22

## 2025-06-22 RX ORDER — PHENYLEPHRINE HYDROCHLORIDE 25 MG/ML
1 SOLUTION/ DROPS OPHTHALMIC
Status: CANCELLED | OUTPATIENT
Start: 2025-06-22 | End: 2025-06-22

## 2025-06-22 RX ORDER — TROPICAMIDE 10 MG/ML
1 SOLUTION/ DROPS OPHTHALMIC
Status: CANCELLED | OUTPATIENT
Start: 2025-06-22 | End: 2025-06-22

## 2025-06-22 RX ORDER — SODIUM CHLORIDE, SODIUM LACTATE, POTASSIUM CHLORIDE, CALCIUM CHLORIDE 600; 310; 30; 20 MG/100ML; MG/100ML; MG/100ML; MG/100ML
20 INJECTION, SOLUTION INTRAVENOUS CONTINUOUS
Status: CANCELLED | OUTPATIENT
Start: 2025-06-22

## 2025-06-22 NOTE — H&P
S:    -61yo  male with progressive decline in quality of vision OD     PMH:  -glaucoma    Meds:  -epic medications revieewed    O: See Ophth encounter with me dated 4-21-25 for full ophth exam        PHYSICAL EXAMINATION:      Head:  normocephalic / atraumatic ; cataract od     Ears:  hearing intact to voice      Nose:  nares clear      Throat:  throat normal      Respiratory:  CTA      Neck:  clear      Cardiovascular:  NSR      Abdomen:  soft      Extremities:  negative edema      Neurologic:  grossly intact      Mental Status/Communication:  within range of normal variation      Physical Disabilities: none            IMPRESSION:      ns Cataract od           PLAN:        Ce/iol od - 6-24-25

## 2025-06-23 NOTE — ANESTHESIA PREPROCEDURE EVALUATION
Procedure:  EXTRACTION EXTRACAPSULAR CATARACT PHACO INTRAOCULAR LENS (IOL) (Right: Eye)    Relevant Problems   CARDIO   (+) Pure hypertriglyceridemia      GI/HEPATIC   (+) Gastroesophageal reflux disease with esophagitis without hemorrhage   (+) Hepatic steatosis      /RENAL   (+) Prostate cancer 2023(HCC)        Physical Exam    Airway   Comment: (+) micro/retrognathia  Mallampati score: III  TM Distance: >3 FB  Neck ROM: full  Upper bite lip test: I  Mouth opening: >= 4 cm      Cardiovascular  Rhythm: regular, Rate: normal    Dental        Pulmonary   Breath sounds clear to auscultation    Neurological    He appears awake, alert and oriented x3.      Other Findings      Anesthesia Plan  ASA Score- 2     Anesthesia Type- IV sedation with anesthesia with ASA Monitors.         Additional Monitors:     Airway Plan: natural airway.           Plan Factors-Exercise tolerance (METS): >4 METS.            Patient is not a current smoker.      Obstructive sleep apnea risk education given perioperatively.        Induction- intravenous.    Postoperative Plan-     Perioperative Resuscitation Plan - Level 1 - Full Code.       Informed Consent- Anesthetic plan and risks discussed with patient.  I personally reviewed this patient with the CRNA. Discussed and agreed on the Anesthesia Plan with the CRNA..      NPO Status:  No vitals data found for the desired time range.

## 2025-06-24 ENCOUNTER — ANESTHESIA (OUTPATIENT)
Dept: PERIOP | Facility: AMBULARY SURGERY CENTER | Age: 60
End: 2025-06-24
Payer: COMMERCIAL

## 2025-06-24 ENCOUNTER — HOSPITAL ENCOUNTER (OUTPATIENT)
Facility: AMBULARY SURGERY CENTER | Age: 60
Setting detail: OUTPATIENT SURGERY
Discharge: HOME | End: 2025-06-24
Attending: OPHTHALMOLOGY | Admitting: OPHTHALMOLOGY
Payer: COMMERCIAL

## 2025-06-24 VITALS
SYSTOLIC BLOOD PRESSURE: 122 MMHG | OXYGEN SATURATION: 97 % | DIASTOLIC BLOOD PRESSURE: 68 MMHG | TEMPERATURE: 97.2 F | HEART RATE: 60 BPM | WEIGHT: 152 LBS | BODY MASS INDEX: 25.95 KG/M2 | HEIGHT: 64 IN | RESPIRATION RATE: 16 BRPM

## 2025-06-24 DIAGNOSIS — Z13.9 ENCOUNTER FOR SCREENING INVOLVING SOCIAL DETERMINANTS OF HEALTH (SDOH): ICD-10-CM

## 2025-06-24 PROCEDURE — 66984 XCAPSL CTRC RMVL W/O ECP: CPT | Performed by: OPHTHALMOLOGY

## 2025-06-24 PROCEDURE — V2632 POST CHMBR INTRAOCULAR LENS: HCPCS | Performed by: OPHTHALMOLOGY

## 2025-06-24 PROCEDURE — NC001 PR NO CHARGE: Performed by: OPHTHALMOLOGY

## 2025-06-24 DEVICE — CLAREON ASPHERIC HYDROPHOBIC ACRYLIC IOL WITH THE AUTONOMEAUTOMATED PRE-LOADED DELIVERY SYSTEM
Type: IMPLANTABLE DEVICE | Site: EYE | Status: FUNCTIONAL
Brand: CLAREON™

## 2025-06-24 RX ORDER — PHENYLEPHRINE HYDROCHLORIDE 25 MG/ML
1 SOLUTION/ DROPS OPHTHALMIC
Status: COMPLETED | OUTPATIENT
Start: 2025-06-24 | End: 2025-06-24

## 2025-06-24 RX ORDER — BALANCED SALT SOLUTION 6.4; .75; .48; .3; 3.9; 1.7 MG/ML; MG/ML; MG/ML; MG/ML; MG/ML; MG/ML
SOLUTION OPHTHALMIC AS NEEDED
Status: DISCONTINUED | OUTPATIENT
Start: 2025-06-24 | End: 2025-06-24 | Stop reason: HOSPADM

## 2025-06-24 RX ORDER — MOXIFLOXACIN 5 MG/ML
SOLUTION/ DROPS OPHTHALMIC AS NEEDED
Status: DISCONTINUED | OUTPATIENT
Start: 2025-06-24 | End: 2025-06-24 | Stop reason: HOSPADM

## 2025-06-24 RX ORDER — SODIUM CHLORIDE, SODIUM LACTATE, POTASSIUM CHLORIDE, CALCIUM CHLORIDE 600; 310; 30; 20 MG/100ML; MG/100ML; MG/100ML; MG/100ML
20 INJECTION, SOLUTION INTRAVENOUS CONTINUOUS
Status: DISCONTINUED | OUTPATIENT
Start: 2025-06-24 | End: 2025-06-24 | Stop reason: HOSPADM

## 2025-06-24 RX ORDER — TETRACAINE HYDROCHLORIDE 5 MG/ML
SOLUTION OPHTHALMIC AS NEEDED
Status: DISCONTINUED | OUTPATIENT
Start: 2025-06-24 | End: 2025-06-24 | Stop reason: HOSPADM

## 2025-06-24 RX ORDER — TROPICAMIDE 10 MG/ML
1 SOLUTION/ DROPS OPHTHALMIC
Status: COMPLETED | OUTPATIENT
Start: 2025-06-24 | End: 2025-06-24

## 2025-06-24 RX ORDER — DEXAMETHASONE SODIUM PHOSPHATE 10 MG/ML
INJECTION, SOLUTION INTRAMUSCULAR; INTRAVENOUS AS NEEDED
Status: DISCONTINUED | OUTPATIENT
Start: 2025-06-24 | End: 2025-06-24 | Stop reason: HOSPADM

## 2025-06-24 RX ORDER — BALANCED SALT SOLUTION ENRICHED WITH BICARBONATE, DEXTROSE, AND GLUTATHIONE
KIT INTRAOCULAR AS NEEDED
Status: DISCONTINUED | OUTPATIENT
Start: 2025-06-24 | End: 2025-06-24 | Stop reason: HOSPADM

## 2025-06-24 RX ORDER — ONDANSETRON 2 MG/ML
4 INJECTION INTRAMUSCULAR; INTRAVENOUS ONCE AS NEEDED
Status: DISCONTINUED | OUTPATIENT
Start: 2025-06-24 | End: 2025-06-24 | Stop reason: HOSPADM

## 2025-06-24 RX ORDER — FENTANYL CITRATE/PF 50 MCG/ML
25 SYRINGE (ML) INJECTION
Status: DISCONTINUED | OUTPATIENT
Start: 2025-06-24 | End: 2025-06-24 | Stop reason: HOSPADM

## 2025-06-24 RX ORDER — POVIDONE-IODINE 5 %
SOLUTION, NON-ORAL OPHTHALMIC (EYE) AS NEEDED
Status: DISCONTINUED | OUTPATIENT
Start: 2025-06-24 | End: 2025-06-24 | Stop reason: HOSPADM

## 2025-06-24 RX ORDER — MIDAZOLAM HYDROCHLORIDE 2 MG/2ML
INJECTION, SOLUTION INTRAMUSCULAR; INTRAVENOUS AS NEEDED
Status: DISCONTINUED | OUTPATIENT
Start: 2025-06-24 | End: 2025-06-24

## 2025-06-24 RX ORDER — PROPARACAINE HYDROCHLORIDE 5 MG/ML
1 SOLUTION/ DROPS OPHTHALMIC ONCE
Status: COMPLETED | OUTPATIENT
Start: 2025-06-24 | End: 2025-06-24

## 2025-06-24 RX ORDER — PROPARACAINE HYDROCHLORIDE 5 MG/ML
SOLUTION/ DROPS OPHTHALMIC AS NEEDED
Status: DISCONTINUED | OUTPATIENT
Start: 2025-06-24 | End: 2025-06-24 | Stop reason: HOSPADM

## 2025-06-24 RX ORDER — CYCLOPENTOLATE HYDROCHLORIDE 10 MG/ML
1 SOLUTION/ DROPS OPHTHALMIC
Status: COMPLETED | OUTPATIENT
Start: 2025-06-24 | End: 2025-06-24

## 2025-06-24 RX ORDER — ACETAMINOPHEN 325 MG/1
650 TABLET ORAL EVERY 4 HOURS PRN
Status: DISCONTINUED | OUTPATIENT
Start: 2025-06-24 | End: 2025-06-24 | Stop reason: HOSPADM

## 2025-06-24 RX ORDER — LIDOCAINE HYDROCHLORIDE 10 MG/ML
INJECTION, SOLUTION EPIDURAL; INFILTRATION; INTRACAUDAL; PERINEURAL AS NEEDED
Status: DISCONTINUED | OUTPATIENT
Start: 2025-06-24 | End: 2025-06-24 | Stop reason: HOSPADM

## 2025-06-24 RX ADMIN — PHENYLEPHRINE HYDROCHLORIDE 1 DROP: 25 SOLUTION/ DROPS OPHTHALMIC at 08:40

## 2025-06-24 RX ADMIN — SODIUM CHLORIDE, SODIUM LACTATE, POTASSIUM CHLORIDE, AND CALCIUM CHLORIDE: .6; .31; .03; .02 INJECTION, SOLUTION INTRAVENOUS at 08:52

## 2025-06-24 RX ADMIN — CYCLOPENTOLATE HYDROCHLORIDE 1 DROP: 10 SOLUTION/ DROPS OPHTHALMIC at 08:40

## 2025-06-24 RX ADMIN — PHENYLEPHRINE HYDROCHLORIDE 1 DROP: 25 SOLUTION/ DROPS OPHTHALMIC at 08:35

## 2025-06-24 RX ADMIN — CYCLOPENTOLATE HYDROCHLORIDE 1 DROP: 10 SOLUTION/ DROPS OPHTHALMIC at 08:30

## 2025-06-24 RX ADMIN — TROPICAMIDE 1 DROP: 10 SOLUTION/ DROPS OPHTHALMIC at 08:40

## 2025-06-24 RX ADMIN — TROPICAMIDE 1 DROP: 10 SOLUTION/ DROPS OPHTHALMIC at 08:35

## 2025-06-24 RX ADMIN — PHENYLEPHRINE HYDROCHLORIDE 1 DROP: 25 SOLUTION/ DROPS OPHTHALMIC at 08:30

## 2025-06-24 RX ADMIN — CYCLOPENTOLATE HYDROCHLORIDE 1 DROP: 10 SOLUTION/ DROPS OPHTHALMIC at 08:35

## 2025-06-24 RX ADMIN — PROPARACAINE HYDROCHLORIDE 1 DROP: 5 SOLUTION/ DROPS OPHTHALMIC at 08:28

## 2025-06-24 RX ADMIN — TROPICAMIDE 1 DROP: 10 SOLUTION/ DROPS OPHTHALMIC at 08:30

## 2025-06-24 RX ADMIN — MIDAZOLAM 2 MG: 1 INJECTION INTRAMUSCULAR; INTRAVENOUS at 08:56

## 2025-06-24 NOTE — OP NOTE
OPERATIVE REPORT  PATIENT NAME: Lencho Mojica    :  1965  MRN: [id number]  Pt Location: [facility] OR ROOM 01    SURGERY DATE: 2025    Surgeons and Role:     * DO Geovany Finn Primary    Preop Diagnosis:  Nuclear sclerotic cataract of right eye    Postop Diagnosis:  Nuclear sclerotic cataract of right eye    Procedure(s):  Right - EXTRACTION EXTRACAPSULAR CATARACT PHACO INTRAOCULAR LENS (IOL)    Specimen(s):  * No specimens in log *    Estimated Blood Loss:   None    Drains:  Closed/Suction Drain LLQ Bulb 19 Fr. (Active)   Number of days: 715       Urethral Catheter Other (Comment) 20 Fr. (Active)   Number of days: 715       Anesthesia Type:   IV Sedation with Anesthesia    Operative Indications:  Nuclear sclerotic cataract of right eye    Operative Findings:  Nuclear sclerotic cataract of right eye       Complications:   None    Procedure and Technique:  Phaco-pciol od   I was present for the entire procedure.    Patient Disposition:  PACU     Service:    Ophthalmology   Location:  [facility]     Date of Operation: 2025        Surgeon: Abhinav Mosse     Assistants: None      Anesthesia:  Topical anesthesia with IV sedation   Operation: Phacoemulsification cataract extraction with PCIOL implantation, OD      Findings: Cataract OD  Specimens and Disposition: None  Complications:  None      Description of Operation:      The patient was brought to the preoperative holding area where the operative eye was confirmed both verbally and on the consent form. A lowell was placed on the skin above the right eye to designate that eye as the correct surgical site. An IV line was placed by the anesthesia staff. Pharmacologic dilating drops were applied to the eye.  The patient was brought to the operating room where the anesthesia team established cardiac monitoring and light IV sedation was administered.  The patient was positioned comfortably supine on an eye bed.  A surgical time-out was  performed.   Two drops of Proparacaine were then applied and the eye was then prepped and draped in the usual sterile fashion for intraocular surgery.  A second time-out was performed, confirming the patient’s identity by name, and date of birth and confirming the correct eye and procedure to be performed. We confirmed that the appropriate IOL implants were present in the room.   An eye lid speculum was then applied for exposure of the right globe.       Sterile preservative-free tetracaine was applied to the globe. A paracentesis port was created with a sideport blade at the 11:30 limbus.   A small amount of  preservative-free 1% lidocaine  was injected into the anterior chamber.  The anterior chamber was then gently filled with Viscoat. A temporal clear cornea incision was then created with a 2.4 mm keratome in a tunneled fashion.  A continuous curvilinear capsulorrhexis (CCC) was initiated with a cystitome and completed with Utrata forceps.  The CCC was nicely centered on the anterior capsule and measured approximately 5.0mm.  Copious but gentle hydrodissection was performed, allowing the nucleus to rotate freely within the capsular bag.   Additional Viscoat was placed in the anterior chamber.  The nucleus was disassembled using a stop and chop technique and the nuclear quadrants  were emulsifed without incident.  The anterior chamber was kept formed during instrument exchange by injecting Provisc into the anterior chamber.  The residual cortical material was then removed with the irrigation and aspiration handpiece.   Provisc was injected into the capsular bag and anterior chamber to add adequate volume to each.   The undersurface of the anterior capsule was polished using a mario sweep for 360 degrees.  An intraocular lens from [employer], model CNA0T0, 22.0 diopters in power, selected after review and interpretation of IOL measurements, was then injected into the capsular bag through the clear corneal incision  using the Autonome injector.  The IOL centered nicely within the capsular bag. The residual viscoelastic material was evacuated from the capsular bag and anterior chamber via the irrigation and aspiration handpiece.    Gentle stromal hydration was then performed at each wound site.          The anterior chamber was well-formed and maintained and the IOP was palpated and found to be at approximately 20 mmHg.         All incisions were dried and luana-tested negative.  The anterior chamber remained well-formed and well-maintained with the IOL very nicely centered within the capsular bag, with approximately 1.0 mm of overlap of the anterior capsular leaflet to the peripheral aspect of the IOL optic for 360 degrees.         An inferonasal subconjunctival injection of Dexamethasone 2mg was then delivered. Two drops of moxifloxacin were applied to the globe.      The eyelid speculum and drapes were carefully removed and the periorbital area was wiped with a sterile, moist sponge followed by a sterile, dry sponge.         The eye was then dressed with a two drops of Moxifloxacin and a danielson shield was taped in place over the globe.   The patient tolerated the procedure well and was taken to the recovery room in excellent condition.  I discussed proper care of the eye following surgery.  Typed instructions were provided as well. The patient was provided my cell phone number to contact me should any questions or problems arise and a follow-up appointment was set for the following morning.                    SIGNATURE: Abhinav Moses,   DATE: June 24, 2025  TIME: 9:59 AM

## 2025-06-24 NOTE — H&P
S: 59yo HM with PPLOV OD due to cataract.    O: See Ophth encounter with me dated 4-21-25 for full ophth exam        PHYSICAL EXAMINATION:      Head:  normocephalic / atraumatic ; cataract od     Ears:  hearing intact to voice      Nose:  nares clear      Throat:  throat normal      Respiratory:  CTA      Neck:  clear      Cardiovascular:  NSR      Abdomen:  soft      Extremities:  negative edema      Neurologic:  grossly intact      Mental Status/Communication:  within range of normal variation      Physical Disabilities: none            IMPRESSION:      ns Cataract od           PLAN:        Ce/iol od - 6-24-25

## 2025-06-24 NOTE — ANESTHESIA POSTPROCEDURE EVALUATION
Post-Op Assessment Note    CV Status:  Stable    Pain management: adequate       Mental Status:  Alert and awake   Hydration Status:  Euvolemic   PONV Controlled:  Controlled   Airway Patency:  Patent  Two or more mitigation strategies used for obstructive sleep apnea   Post Op Vitals Reviewed: Yes    No anethesia notable event occurred.    Staff: Anesthesiologist         Last Filed PACU Vitals:  Vitals Value Taken Time   Temp 99.3 °F (37.4 °C) 06/24/25 10:14   Pulse 64 06/24/25 10:14   /73 06/24/25 10:14   Resp 16 06/24/25 10:14   SpO2 98 % 06/24/25 10:14       Modified Graciela:     Vitals Value Taken Time   Activity 2 06/24/25 10:14   Respiration 2 06/24/25 10:14   Circulation 2 06/24/25 10:14   Consciousness 2 06/24/25 10:14   Oxygen Saturation 2 06/24/25 10:14     Modified Graciela Score: 10

## 2025-06-24 NOTE — ANESTHESIA POSTPROCEDURE EVALUATION
Post-Op Assessment Note    CV Status:  Stable  Pain Score: 0    Pain management: adequate       Mental Status:  Alert   Hydration Status:  Stable   PONV Controlled:  None   Airway Patency:  Patent  Two or more mitigation strategies used for obstructive sleep apnea   Post Op Vitals Reviewed: Yes      Staff: CRNA           Last Filed PACU Vitals:  Vitals Value Taken Time   Temp 98.5 °F (36.9 °C) 06/24/25 09:58   Pulse 58 06/24/25 09:58   /75 06/24/25 09:58   Resp 18 06/24/25 09:58   SpO2 96 % 06/24/25 09:58       Modified Graciela:     Vitals Value Taken Time   Activity 2 06/24/25 09:58   Respiration 2 06/24/25 09:58   Circulation 2 06/24/25 09:58   Consciousness 2 06/24/25 09:58   Oxygen Saturation 2 06/24/25 09:58     Modified Graciela Score: 10

## 2025-06-25 ENCOUNTER — OFFICE VISIT (OUTPATIENT)
Age: 60
End: 2025-06-25

## 2025-06-25 DIAGNOSIS — Z09 POSTOP CHECK: Primary | ICD-10-CM

## 2025-06-25 PROCEDURE — 99024 POSTOP FOLLOW-UP VISIT: CPT | Performed by: OPHTHALMOLOGY

## 2025-06-25 NOTE — PROGRESS NOTES
Name: Lencho Mojica      : 1965      MRN: [id number]  Encounter Provider: Abhinav Moses DO  Encounter Date: 2025   Encounter department: [facility] OPHTHALMOLOGY  :  Assessment & Plan  Postop check  Pod1 s/p [id number]  All looks good today  IOP OK  Hasn't used Mox or pred yet (misunderstood instructions)    Plan:  Moxi od q4h wa  Pred od q4h wa  Lv od qam  Cont diamox 250mg - bid  Cruz shield od qhs  F/u 1 wk or sooner prn  PLEASE EXCUSE FROM ALL DUTIES UNTIL 25  Return in about 1 week (around 2025) for postop.               Lencho Mojica is a 60 y.o. male who presents for 1 day POV s/p phaco/PCIOL OD.    He reports some mild discomfort OD.  Reports taking pill for pressure as directed.  Vision greatly improved.    History obtained from: patient    Review of Systems  Medical History Reviewed by provider this encounter:       HPI     Post-op     Additional comments: 1 day POV s/p phaco/PCIOL OD           Comments    Ocular Meds/Drops:   Moxifloxacin - ld before sx  Prednisolone - ld before sx  Diamox TID PO - ld this AM    Pt presents for Pod1 s/p [id number]  All seems ok    Meds:  Moxi od q4h wa-hasn't used this or pred yet  Pred od q4h wa  Dosed diamox 250mg last night and this am  Cruz shield od qhs             Last edited by Abhinav Moses DO on 2025 10:15 AM.             Past Ocular History: cataract, pseudo, pacg OD    Ocular Meds/Drops:   Moxifloxacin - ld before sx  Prednisolone - ld before sx  Diamox TID PO - ld this AM    Base Eye Exam     Visual Acuity (Snellen - Linear)       Right Left    Dist sc 20/20           Tonometry (Applanation, 10:17 AM)       Right Left    Pressure 15.5           Neuro/Psych     Oriented x3: Yes    Mood/Affect: Normal            Slit Lamp and Fundus Exam     External Exam       Right Left    External Normal Normal          Slit Lamp Exam       Right Left    Lids/Lashes Normal Normal    Conjunctiva/Sclera White and  quiet White and quiet    Cornea Clear, avtar neg Clear    Anterior Chamber +2 cell Deep and quiet    Iris Round and slightly reactive Round and reactive    Lens clear pciol +1NSC    Anterior Vitreous clear No PVD, clear, no cell          Fundus Exam       Right Left    Disc sharp, no pallor, sup thinner than inf     C/D Ratio ~0.7                   IMAGING:    SDE_DELIM[id number]  [id number]

## 2025-06-30 ENCOUNTER — OFFICE VISIT (OUTPATIENT)
Age: 60
End: 2025-06-30

## 2025-06-30 DIAGNOSIS — Z09 POSTOP CHECK: Primary | ICD-10-CM

## 2025-06-30 PROCEDURE — 99024 POSTOP FOLLOW-UP VISIT: CPT | Performed by: OPHTHALMOLOGY

## 2025-06-30 RX ORDER — BRIMONIDINE TARTRATE AND TIMOLOL MALEATE 2; 5 MG/ML; MG/ML
SOLUTION OPHTHALMIC
Qty: 5 ML | Refills: 12 | Status: SHIPPED | OUTPATIENT
Start: 2025-06-30

## 2025-06-30 RX ORDER — NETARSUDIL 0.2 MG/ML
SOLUTION/ DROPS OPHTHALMIC; TOPICAL
Qty: 2.5 ML | Refills: 12 | Status: SHIPPED | OUTPATIENT
Start: 2025-06-30

## 2025-06-30 NOTE — PROGRESS NOTES
Name: Lencho Mojica      : 1965      MRN: [id number]  Encounter Provider: Abhinva Moses DO  Encounter Date: 2025   Encounter department: [facility] OPHTHALMOLOGY  :  Assessment & Plan  Postop check  -looks good  -pleased with ucdva od  -iop very good  -has +2 fine pigm cell ac rxn    Plan:  -mox qid - stop after tomorrow  -pred qid til fu  -timolol-stop  -start combig od qam  -start rhop od at dinner  -diamox-stop (having fatigue)  -will need VF test once healed    Return in about 2 weeks (around 2025) for postop.      Orders:    brimonidine-timolol (Combigan) 0.2-0.5 %; Instill 1 drop in right eye each morning    Netarsudil Dimesylate (Rhopressa) 0.02 % SOLN; Instill 1 drop in right eye each day at dinnertime            Lencho Mojica is a 60 y.o. male who presents for 1 week POV OD.    Patient states vision is improving. Patient states when the sun shines eye itches.    History obtained from: patient    Review of Systems  Medical History Reviewed by provider this encounter:       HPI       Post-op     Additional comments: Patient states vision is improving. Patient states when the sun shines eye itches.             Comments    Pod6  s/p [id number]    Plan:  Moxi od q4h wa  Pred od q4h wa  Lv od qam - ld this am  Diamox 250mg - bid - ld this am  Cruz shield od qhs            Last edited by Abhinav Moses DO on 2025  4:13 PM.               Base Eye Exam       Visual Acuity (Snellen - Linear)         Right Left    Dist sc 20/20               Tonometry (gat, 4:23 PM)         Right Left    Pressure 12 20              Pupils         Pupils APD    Right PERRL None    Left PERRL None              Visual Fields         Left Right     Full Full              Extraocular Movement         Right Left     Full Full              Neuro/Psych       Oriented x3: Yes    Mood/Affect: Normal                  Additional Tests       Keratometry         K1 Axis K2 Axis Mires    Right  43.00 004 45.75 094 2.75    Left 43.25 177 45.00 087 1.75                  Slit Lamp and Fundus Exam       External Exam         Right Left    External Normal Normal              Slit Lamp Exam         Right Left    Lids/Lashes Normal Normal    Conjunctiva/Sclera White and quiet White and quiet    Cornea +2 endoth pigm +2 ks    Anterior Chamber +2 fine pigm cell Deep and quiet    Iris r/r, no tid Round and reactive    Lens pciol, +1-2 ant optic pigm +1NSC    Anterior Vitreous clear No PVD, clear, no cell              Fundus Exam         Right Left    Disc sharp, no pallor, thinner sup than inf sharp, no pallor    C/D Ratio ~0.7 ~0.55-0.6                  Refraction       Manifest Refraction (Auto)         Sphere Cylinder Axis    Right +0.50 -1.25 009    Left +2.00 -0.50 143                      IMAGING:

## 2025-07-07 ENCOUNTER — OFFICE VISIT (OUTPATIENT)
Dept: UROLOGY | Facility: CLINIC | Age: 60
End: 2025-07-07
Payer: COMMERCIAL

## 2025-07-07 VITALS
HEART RATE: 68 BPM | WEIGHT: 156 LBS | DIASTOLIC BLOOD PRESSURE: 68 MMHG | BODY MASS INDEX: 26.63 KG/M2 | OXYGEN SATURATION: 98 % | SYSTOLIC BLOOD PRESSURE: 120 MMHG | HEIGHT: 64 IN

## 2025-07-07 DIAGNOSIS — N52.31 ERECTILE DYSFUNCTION AFTER RADICAL PROSTATECTOMY: ICD-10-CM

## 2025-07-07 DIAGNOSIS — C61 PROSTATE CANCER (HCC): Primary | ICD-10-CM

## 2025-07-07 PROCEDURE — 99213 OFFICE O/P EST LOW 20 MIN: CPT | Performed by: PHYSICIAN ASSISTANT

## 2025-07-07 RX ORDER — SILDENAFIL 100 MG/1
100 TABLET, FILM COATED ORAL DAILY PRN
Qty: 30 TABLET | Refills: 3 | Status: SHIPPED | OUTPATIENT
Start: 2025-07-07

## 2025-07-07 RX ORDER — TADALAFIL 20 MG/1
20 TABLET ORAL DAILY PRN
Qty: 15 TABLET | Refills: 3 | Status: SHIPPED | OUTPATIENT
Start: 2025-07-07

## 2025-07-07 NOTE — PROGRESS NOTES
"Name: Lencho Mojica      : 1965      MRN: [id number]  Encounter Provider: Amish Azul PA-C  Encounter Date: 2025   Encounter department: [facility] UROLOGY [facility]  :  Assessment & Plan  Prostate cancer (HCC)    Orders:    PSA Total, Diagnostic; Future  Follow-up in 1 year with PSA prior to visit  Erectile dysfunction after radical prostatectomy    Orders:    sildenafil (VIAGRA) 100 mg tablet; Take 1 tablet (100 mg total) by mouth daily as needed for erectile dysfunction    tadalafil (CIALIS) 20 MG tablet; Take 1 tablet (20 mg total) by mouth daily as needed for erectile dysfunction  Will try Cialis and Viagra combination      History of Present Illness   Lencho Mojica is a 60 y.o. male who presents history of [place] 7 stage II prostate cancer.  He had a robotic prostatectomy in 2023.  PSA remains undetectable <0.008.  He has good urinary control.  His erections have not returned.  He had troubles with Trimix in the past.  He has prescription for Cialis and Viagra.    Review of Systems       Objective   /68 (BP Location: Left arm, Patient Position: Sitting, Cuff Size: Standard)   Pulse 68   Ht 5' 4\" (1.626 m)   Wt 70.8 kg (156 lb)   SpO2 98%   BMI 26.78 kg/m²     Physical Exam GEN: no acute distress    RESP: breathing comfortably with no accessory muscle use    ABD: soft, non-tender, non-distended   INCISION:    EXT: no significant peripheral edema       RADIOLOGY:   none        Results   Lab Results   Component Value Date    PSA <0.008 2025    PSA <0.008 2025    PSA <0.01 2024     Lab Results   Component Value Date    CALCIUM 9.3 2025    K 4.0 2025    CO2 25 2025     2025    BUN 18 2025    CREATININE 0.78 2025     Lab Results   Component Value Date    WBC 9.51 2025    HGB 14.9 2025    HCT 43.1 2025    MCV 88 2025     2025       Office Urine Dip  No " results found for this or any previous visit (from the past hour).

## 2025-07-15 ENCOUNTER — OFFICE VISIT (OUTPATIENT)
Age: 60
End: 2025-07-15

## 2025-07-15 DIAGNOSIS — Z09 POSTOP CHECK: ICD-10-CM

## 2025-07-15 DIAGNOSIS — H40.051 OCULAR HYPERTENSION, RIGHT: Primary | ICD-10-CM

## 2025-07-15 PROCEDURE — 99024 POSTOP FOLLOW-UP VISIT: CPT | Performed by: OPHTHALMOLOGY

## 2025-07-15 RX ORDER — ERYTHROMYCIN 5 MG/G
OINTMENT OPHTHALMIC
Qty: 3.5 G | Refills: 1 | Status: SHIPPED | OUTPATIENT
Start: 2025-07-15

## 2025-07-15 NOTE — PROGRESS NOTES
Name: Lencho Mojica      : 1965      MRN: [id number]  Encounter Provider: Abhinav Moses DO  Encounter Date: 7/15/2025   Encounter department: [facility] OPHTHALMOLOGY  :  Assessment & Plan  Postop check  -POD 21 OD  -fine ac cell rxn evident at lv now nearly resolved  -did not get combig or rhop - too $$  -diamox caused fatigue  -healing well  -he notes some irritation at lateral canthal area - has mild angular blepharitis on right     Oc med: OD  -pred:  Use qid today than Instill 1 drop 3 times/day for 5 days then 2 times/day for 5 days then 1 time/day for 5 days then stop  -cont Lv OD qam  -start Rhop OD at dinner (sample provided)  -start emycin to right lateral canthal area bid  -will need VF test once healed  Return in about 3 weeks (around 2025) for Refraction, RNFL + Mac OCT OD.             Lencho Mojica is a 60 y.o. male who presents for 2 week IOP check.    Patient states he was not able to get Rhopressa due to cost. Would like to see if there is an alternative    Patient is using Prednisolone acetate and Combigan.  Patient wanda any vision changes.  History obtained from: patient    Review of Systems  Medical History Reviewed by provider this encounter:       HPI     IOP Check     Additional comments:   Patient states he was not able to get Rhopressa due to cost. Would like to see if there is an alternative    Patient is using Prednisolone acetate and Combigan.  Patient wanda any vision changes.           Comments    Postop check  -POD 21 OD  -had +2 fine pigm cell ac rxn at lv  -diamox caused fatigue so we stopped  -did not get combig or rhop - too $$  -va seems ok od  -he notes some irritation at lateral canthal area     Oc med:  -pred qid   -timolol-ld this am (did not stop since did not get combig)  -will need VF test once healed             Last edited by Abhinav Moses DO on 2025  6:53 PM.               Base Eye Exam     Visual Acuity (Snellen - Linear)        Right Left    Dist sc 20/20 20/60-1    Dist ph sc  20/20-1          Tonometry (Applanation, 4:59 PM)       Right Left    Pressure 15.5 16          Pupils       Pupils APD    Right PERRL None    Left PERRL None          Visual Fields       Left Right     Full Full          Extraocular Movement       Right Left     Full Full          Neuro/Psych     Oriented x3: Yes    Mood/Affect: Normal            Slit Lamp and Fundus Exam     External Exam       Right Left    External Normal Normal          Slit Lamp Exam       Right Left    Lids/Lashes Normal Normal    Conjunctiva/Sclera White and quiet White and quiet    Cornea +2 endoth pigm +2 ks    Anterior Chamber 0.5cell Deep and quiet    Iris r/r, no tid Round and reactive    Lens pciol, +1-2 ant optic pigm +1NSC    Anterior Vitreous clear No PVD, clear, no cell          Fundus Exam       Right Left    Disc sharp, no pallor, sup thinner than inf sharp, no pallor    C/D Ratio 0.7 0.5    Macula flat/no heme flat/no heme                  IMAGING:

## 2025-07-17 ENCOUNTER — TELEPHONE (OUTPATIENT)
Age: 60
End: 2025-07-17

## 2025-07-19 ENCOUNTER — HOSPITAL ENCOUNTER (EMERGENCY)
Facility: HOSPITAL | Age: 60
Discharge: HOME | End: 2025-07-20
Attending: EMERGENCY MEDICINE | Admitting: EMERGENCY MEDICINE
Payer: COMMERCIAL

## 2025-07-19 VITALS
HEART RATE: 68 BPM | SYSTOLIC BLOOD PRESSURE: 117 MMHG | OXYGEN SATURATION: 96 % | TEMPERATURE: 98 F | DIASTOLIC BLOOD PRESSURE: 74 MMHG | RESPIRATION RATE: 16 BRPM

## 2025-07-19 DIAGNOSIS — H10.9 CONJUNCTIVITIS: Primary | ICD-10-CM

## 2025-07-19 PROCEDURE — 99284 EMERGENCY DEPT VISIT MOD MDM: CPT | Performed by: EMERGENCY MEDICINE

## 2025-07-19 PROCEDURE — 99282 EMERGENCY DEPT VISIT SF MDM: CPT

## 2025-07-19 RX ORDER — PHENYLEPHRINE HYDROCHLORIDE 100 MG/ML
1 SOLUTION/ DROPS OPHTHALMIC ONCE
Status: COMPLETED | OUTPATIENT
Start: 2025-07-19 | End: 2025-07-19

## 2025-07-19 RX ORDER — ERYTHROMYCIN 5 MG/G
0.5 OINTMENT OPHTHALMIC ONCE
Status: COMPLETED | OUTPATIENT
Start: 2025-07-19 | End: 2025-07-19

## 2025-07-19 RX ORDER — TROPICAMIDE 10 MG/ML
1 SOLUTION/ DROPS OPHTHALMIC ONCE
Status: COMPLETED | OUTPATIENT
Start: 2025-07-19 | End: 2025-07-19

## 2025-07-19 RX ORDER — TETRACAINE HYDROCHLORIDE 5 MG/ML
2 SOLUTION OPHTHALMIC ONCE
Status: COMPLETED | OUTPATIENT
Start: 2025-07-19 | End: 2025-07-19

## 2025-07-19 RX ORDER — PHENYLEPHRINE HYDROCHLORIDE 25 MG/ML
1 SOLUTION/ DROPS OPHTHALMIC ONCE
Status: DISCONTINUED | OUTPATIENT
Start: 2025-07-19 | End: 2025-07-19

## 2025-07-19 RX ADMIN — TROPICAMIDE 1 DROP: 10 SOLUTION/ DROPS OPHTHALMIC at 23:46

## 2025-07-19 RX ADMIN — PHENYLEPHRINE HYDROCHLORIDE 1 DROP: 100 SOLUTION/ DROPS OPHTHALMIC at 23:46

## 2025-07-19 RX ADMIN — TETRACAINE HYDROCHLORIDE 2 DROP: 5 SOLUTION OPHTHALMIC at 21:45

## 2025-07-19 RX ADMIN — FLUORESCEIN SODIUM 1 STRIP: 1 STRIP OPHTHALMIC at 21:45

## 2025-07-19 RX ADMIN — ERYTHROMYCIN 0.5 INCH: 5 OINTMENT OPHTHALMIC at 22:48

## 2025-07-20 RX ORDER — CYCLOPENTOLATE HYDROCHLORIDE 10 MG/ML
1 SOLUTION/ DROPS OPHTHALMIC 2 TIMES DAILY
Status: DISCONTINUED | OUTPATIENT
Start: 2025-07-20 | End: 2025-07-20 | Stop reason: HOSPADM

## 2025-07-20 RX ORDER — PREDNISOLONE ACETATE 10 MG/ML
1 SUSPENSION/ DROPS OPHTHALMIC
Status: DISCONTINUED | OUTPATIENT
Start: 2025-07-20 | End: 2025-07-20 | Stop reason: HOSPADM

## 2025-07-20 RX ADMIN — CYCLOPENTOLATE HYDROCHLORIDE 1 DROP: 10 SOLUTION/ DROPS OPHTHALMIC at 01:18

## 2025-07-20 RX ADMIN — PREDNISOLONE ACETATE 1 DROP: 10 SUSPENSION/ DROPS OPHTHALMIC at 01:16

## 2025-07-20 NOTE — CONSULTS
This 60-year-old gentleman presents complaining of abrupt onset of pain in the right eye earlier today.  He had cataract surgery 3 weeks ago in the right eye.  He is on Rhopressa for glaucoma.  He is on steroid drops and was recently seen in the office.  The frequency of drops was decreased at that time.    The patient has been treated with erythromycin ointment prior to my arrival.  On examination, visual acuity without correction at near is 20/200 pinhole 20/100.  Pupils are equal round and reactive to light with no afferent defect.  Extraocular movements are intact.  The external examination is unremarkable.  There is 1+ conjunctival injection in the right eye.  The cornea is clear and the anterior chamber is formed.  I am not able to appreciate a significant anterior chamber reaction at the slit-lamp.  The intraocular pressure in the right eye is 10.    The right eye was dilated with Mydriacyl and Harpreet-Synephrine at 12:05 AM.  This resulted in a significant decrease in discomfort.    There is well centered posterior chamber intraocular lens in the right eye.  The vitreous cavity is clear.  The optic nerve is pink and flat with physiologic cupping.  The macula, vessels and periphery are unremarkable.    Impression: Rebound inflammation following cataract surgery.    Plan: Increase Pred forte drops to 6 times a day.  Add Cyclogyl twice a day.  Continue Rhopressa nightly.  The patient is instructed to call me tomorrow morning on my cell phone if he is worsening, otherwise he will follow-up with his regular eye care provider next week.

## 2025-07-20 NOTE — ED ATTENDING ATTESTATION
7/19/2025  I, Anup Lennon MD, saw and evaluated the patient. I have discussed the patient with the resident/non-physician practitioner and agree with the resident's/non-physician practitioner's findings, Plan of Care, and MDM as documented in the resident's/non-physician practitioner's note, except where noted. All available labs and Radiology studies were reviewed.  I was present for key portions of any procedure(s) performed by the resident/non-physician practitioner and I was immediately available to provide assistance.       At this point I agree with the current assessment done in the Emergency Department.  I have conducted an independent evaluation of this patient a history and physical is as follows:    ED Course     Impression: Right eye redness, right eye pain, history of recent cataract surgery  Differential diagnosis: Conjunctivitis, blepharitis, corneal abrasion, corneal ulcer, endophthalmitis    Patient 3 weeks postop cataract surgery with red painful eye today.  Patient recently seen by his surgeon discharged on steroid drops repress and timolol patient developed acute pain in right eye eye redness and swelling.    Negative fluorescein Lids everted no foreign body appreciated slit-lamp within normal limits pupil round reactive    Case discussed with ophthalmology who saw and evaluated patient while in the emergency department.  No evidence of endophthalmitis patient be discharged to follow-up with eye surgeon as outpatient return precautions given.    Critical Care Time  Procedures

## 2025-07-20 NOTE — ED PROVIDER NOTES
"Time reflects when diagnosis was documented in both MDM as applicable and the Disposition within this note       Time User Action Codes Description Comment    7/19/2025 10:27 PM Megan Nolen Add [H10.9] Conjunctivitis           ED Disposition       ED Disposition   Discharge    Condition   Stable    Date/Time   Sun Jul 20, 2025  1:04 AM    Comment   Lencho Kenyonkayleeronda discharge to home/self care.                   Assessment & Plan       Medical Decision Making  Patient is a 60 y.o. male  who presents to the ED with right eye pain and redness.    Vital signs within normal limits. Exam as listed below.    History and physical exam are most consistent with eye pain   Differential diagnosis I considered includes but is not limited to infection     Plan snellen chart, fluorescein dye staining, ocular pressures  No abnormalities visualized on slit lamp exam  Negative luana sign  Erythromycin ointment in R eye  Consulted Optho. Dr. Mclaughlin will consult on patient  Discharge with eye drops and ophthalmologist follow up next week    View ED course below for further discussion on patient workup.       All labs reviewed and utilized in the medical decision making process  All radiology studies independently viewed by me and interpreted by the radiologist.  I reviewed all testing with the patient.     Patient is stable for discharge. Recommended follow up with cataract surgeon next week. Discussed return precautions with patient and they expressed understanding.     Portions of the record may have been created with voice recognition software. Occasional wrong word or \"sound a like\" substitutions may have occurred due to the inherent limitations of voice recognition software. Read the chart carefully and recognize, using context, where substitutions have occurred.     [id number]       Risk  Prescription drug management.        ED Course as of 07/20/25 0129   Sat Jul 19, 2025   2150 Ocular pressures:  13mmhg in left " eye  16mmhg in right eye   2219 Negative luana sign     2324 Contacted Dr. Abhinav Moses the patient's cataract surgeon who recommends increasing the prednisone drop to one drop every three hours while awake, aiming for 5 to 6 times a day and to follow up in clinic early in the week   Sun Jul 20, 2025   0056 Very much appreciate Dr. Mclaughlin's input       Medications   prednisoLONE acetate (PRED FORTE) 1 % ophthalmic suspension 1 drop (1 drop Right Eye Given 7/20/25 0116)   cyclopentolate (CYCLOGYL) 1 % ophthalmic solution 1 drop (1 drop Right Eye Given 7/20/25 0118)   fluorescein sodium sterile ophthalmic strip 1 strip (1 strip Right Eye Given by Other 7/19/25 2145)   tetracaine 0.5 % ophthalmic solution 2 drop (2 drops Right Eye Given by Other 7/19/25 2145)   erythromycin (ILOTYCIN) 0.5 % ophthalmic ointment 0.5 inch (0.5 inches Right Eye Given 7/19/25 2248)   tropicamide (MYDRIACYL) 1 % ophthalmic solution 1 drop (1 drop Ophthalmic Given by Other 7/19/25 2346)   phenylephrine (GIBSON-SYNEPHRINE) 10 % ophthalmic solution 1 drop (1 drop Right Eye Given by Other 7/19/25 2346)       ED Risk Strat Scores                    No data recorded                            History of Present Illness       Chief Complaint   Patient presents with    Eye Redness     Right eye red beginning a few hours ago. States recent cataract surgery about 3 weeks ago.        Past Medical History[1]   Past Surgical History[2]   Family History[3]   Social History[4]   E-Cigarette/Vaping    E-Cigarette Use Never User       E-Cigarette/Vaping Substances    Nicotine No     THC No     CBD No     Flavoring No     Other No     Unknown No       I have reviewed and agree with the history as documented.     59 yo male past medical history significant for right eye cataract surgery 3 weeks ago who presents to the ED with right eye pain and redness beginning 3 hours ago.  Atraumatic.  Patient was sitting when pain and redness began.  Vision is intact.   Pain with ocular movements in affected eye.  Last saw his ophthalmologist Dr. Abhinav Moses on Tuesday who said that the eye was healing with some evidence of mild angular blepharitis on the right so prescribed erythromycin and Rhop drops. Also using timolol drops and on a prednisolone eye drop taper.  Does state he is compliant with his eyedrops.  Denies fever, chills, nausea, vomiting, headache, visual changes, sore throat, cough.        Review of Systems   Constitutional:  Negative for chills and fever.   HENT:  Negative for ear discharge, ear pain, hearing loss and sore throat.    Eyes:  Positive for photophobia, pain and redness. Negative for discharge, itching and visual disturbance.   Respiratory:  Negative for shortness of breath.    Cardiovascular:  Negative for chest pain.   Gastrointestinal: Negative.    Neurological:  Negative for dizziness, light-headedness and headaches.           Objective       ED Triage Vitals [07/19/25 2043]   Temperature Pulse Blood Pressure Respirations SpO2 Patient Position - Orthostatic VS   98 °F (36.7 °C) 68 117/74 16 96 % Sitting      Temp Source Heart Rate Source BP Location FiO2 (%) Pain Score    Temporal -- Left arm -- --      Vitals      Date and Time Temp Pulse SpO2 Resp BP Pain Score FACES Pain Rating User   07/19/25 2043 98 °F (36.7 °C) 68 96 % 16 117/74 -- -- CC            Physical Exam  Constitutional:       Appearance: Normal appearance.   HENT:      Head: Normocephalic and atraumatic.     Eyes:      General: Lids are normal. Vision grossly intact. No visual field deficit or scleral icterus.        Right eye: No foreign body, discharge or hordeolum.      Intraocular pressure: Right eye pressure is 16 mmHg. Left eye pressure is 13 mmHg. Measurements were taken using a handheld tonometer.     Extraocular Movements: Extraocular movements intact.      Right eye: Normal extraocular motion and no nystagmus.      Conjunctiva/sclera:      Right eye: Right conjunctiva  is injected. Chemosis present. No exudate or hemorrhage.     Left eye: Left conjunctiva is not injected. No chemosis, exudate or hemorrhage.     Pupils: Pupils are equal, round, and reactive to light.      Right eye: No corneal abrasion or fluorescein uptake. Sohail exam negative.      Slit lamp exam:     Right eye: Photophobia present. No corneal flare, corneal ulcer, foreign body, hyphema, hypopyon, anterior chamber bulge or anterior chamber flares.      Visual Fields:      Right eye: CF in the upper temporal quadrant. CF in the upper nasal quadrant. CF in the lower temporal quadrant. CF in the lower nasal quadrant.      Left eye: CF in the upper nasal quadrant. CF in the upper temporal quadrant. CF in the lower nasal quadrant. CF in the lower temporal quadrant.      Comments: Right eye ocular pain with movement. Watery discharge  Visual acuity:  L eye 20/200 R eye 20/100     Cardiovascular:      Rate and Rhythm: Normal rate and regular rhythm.   Pulmonary:      Effort: Pulmonary effort is normal.      Breath sounds: Normal breath sounds.     Neurological:      Mental Status: He is alert.       Results Reviewed       None            No orders to display       Procedures    ED Medication and Procedure Management   Prior to Admission Medications   Prescriptions Last Dose Informant Patient Reported? Taking?   Netarsudil Dimesylate (Rhopressa) 0.02 % SOLN  Self No No   Sig: Instill 1 drop in right eye each day at dinnertime   Patient not taking: Reported on 7/15/2025   Probiotic TABS  Self No No   Sig: Take 1 tablet by mouth in the morning and 1 tablet before bedtime.   erythromycin (ILOTYCIN) ophthalmic ointment   No No   Sig: Apply a 1-2mm strip to area of irritated eyelid twice a day   latanoprost (XALATAN) 0.005 % ophthalmic solution  Self No No   Sig: Administer 1 drop to the right eye daily at bedtime   Patient not taking: Reported on 7/15/2025   moxifloxacin (VIGAMOX) 0.5 % ophthalmic solution  Self No No    Sig: Instill 1 drop in right eye 4 times a day after your eye procedure   Patient not taking: Reported on 7/15/2025   polyvinyl alcohol (LIQUIFILM TEARS) 1.4 % ophthalmic solution  Self Yes No   Si drop as needed for dry eyes   prednisoLONE acetate (PRED FORTE) 1 % ophthalmic suspension  Self No No   Sig: Instill 1 drop in right eye 4 times a day after eye procedure (shake well)   sildenafil (VIAGRA) 100 mg tablet   No No   Sig: Take 1 tablet (100 mg total) by mouth daily as needed for erectile dysfunction   tadalafil (CIALIS) 20 MG tablet   No No   Sig: Take 1 tablet (20 mg total) by mouth daily as needed for erectile dysfunction      Facility-Administered Medications: None     Patient's Medications   Discharge Prescriptions    No medications on file     No discharge procedures on file.  ED SEPSIS DOCUMENTATION   Time reflects when diagnosis was documented in both MDM as applicable and the Disposition within this note       Time User Action Codes Description Comment    2025 10:27 PM Megan Nolen Add [H10.9] Conjunctivitis                          [1]   Past Medical History:  Diagnosis Date    Cancer (HCC)     prostate    COVID     Elevated PSA     Hepatic steatosis     Hepatomegaly     High cholesterol     diet controlled    Neck pain     takes robaxin prn   [2]   Past Surgical History:  Procedure Laterality Date    HERNIA REPAIR      OH BX PROSTATE STRTCTC SATURATION SAMPLING IMG GID N/A 2023    Procedure: TRANSPERINEAL MRI FUSION BIOPSY PROSTATE;  Surgeon: Anna Traore MD;  Location: [facility];  Service: Urology    OH LAPS SURG [id number] RPBIC RAD W/NRV SPARING ROBOT N/A 07/10/2023    Procedure: ROBOTIC RADICAL PROSTATECTOMY, PELVIC LYMPH NODE DISSECTION;  Surgeon: Marcio Sosa MD;  Location: [facility];  Service: Urology    OH RPR RECRT INGUINAL HERNIA ANY AGE REDUCIBLE Left 2024    Procedure: REPAIR RECURRENT LEFT INGUINAL HERNIA WITH MESH;  Surgeon: Martin Wharton MD;   Location:  MAIN OR;  Service: General    MN XCAPSL CTRC RMVL INSJ IO LENS PROSTH W/O ECP Right 6/24/2025    Procedure: EXTRACTION EXTRACAPSULAR CATARACT PHACO INTRAOCULAR LENS (IOL);  Surgeon: Abhinav Moses DO;  Location[facility];  Service: Ophthalmology    PROSTATE BIOPSY  08/2021    benign    PROSTATE SURGERY      VARICOSE VEIN SURGERY     [3]   Family History  Problem Relation Name Age of Onset    Cancer Mother      Glaucoma Brother     [4]   Social History  Tobacco Use    Smoking status: Never    Smokeless tobacco: Never   Vaping Use    Vaping status: Never Used   Substance Use Topics    Alcohol use: Yes     Comment: socially    Drug use: Never        Megan Nolen DO  07/20/25 0223

## 2025-07-20 NOTE — DISCHARGE INSTRUCTIONS
Please follow up with your eye doctor on Monday. Let them know you were seen in the emergency department over the weekend.    Take eye drops as prescribed.    Please return to the ED if you experience loss of vision or vision changes, discharge from eyes, fever/chills, nausea, vomiting or worsening of symptoms.  
rash

## 2025-07-23 ENCOUNTER — TELEPHONE (OUTPATIENT)
Age: 60
End: 2025-07-23

## 2025-07-23 NOTE — TELEPHONE ENCOUNTER
Left message for patient to call back to schedule sooner follow up per Dr. Moses. Patient was seen in ED for inflammation and needs surface/IOP check

## (undated) DEVICE — SURGICAL CLIPPER BLADE GENERAL USE

## (undated) DEVICE — SUT PDS II 0 CT-1 27 IN Z340H

## (undated) DEVICE — NEEDLE 25G X 1 1/2

## (undated) DEVICE — KIT, BETHLEHEM ROBOTIC PROST: Brand: CARDINAL HEALTH

## (undated) DEVICE — TROCAR PORT ACCESS 12 X120MM W/BLDLS OPTICAL TIP AIRSEAL

## (undated) DEVICE — SUT PROLENE 2-0 SH 30 IN 8833H

## (undated) DEVICE — INTENDED FOR TISSUE SEPARATION, AND OTHER PROCEDURES THAT REQUIRE A SHARP SURGICAL BLADE TO PUNCTURE OR CUT.: Brand: BARD-PARKER SAFETY BLADES SIZE 11, STERILE

## (undated) DEVICE — AIRSEAL TUBE SMOKE EVAC LUMENX3 FILTERED

## (undated) DEVICE — DRAPE EQUIPMENT RF WAND

## (undated) DEVICE — POV-IOD SOLUTION 4OZ BT

## (undated) DEVICE — VISUALIZATION SYSTEM: Brand: CLEARIFY

## (undated) DEVICE — SYRINGE 3ML LL

## (undated) DEVICE — TIP COVER ACCESSORY

## (undated) DEVICE — SYRINGE 30ML LL

## (undated) DEVICE — PENROSE DRAIN, 18 X 3 8: Brand: CARDINAL HEALTH

## (undated) DEVICE — CHLORAPREP HI-LITE 26ML ORANGE

## (undated) DEVICE — VESSEL SEALER EXTEND: Brand: ENDOWRIST

## (undated) DEVICE — TIBURON TRANSVERSE LAPAROTOMY SHEET: Brand: CONVERTORS

## (undated) DEVICE — MONOPOLAR CURVED SCISSORS: Brand: ENDOWRIST

## (undated) DEVICE — ADHESIVE SKIN HIGH VISCOSITY EXOFIN 1ML

## (undated) DEVICE — BETHLEHEM UNIVERSAL OUTPATIENT: Brand: CARDINAL HEALTH

## (undated) DEVICE — SPONGE LAP 18 X 4 IN STRL RFD

## (undated) DEVICE — SPECIMEN CONTAINER STERILE PEEL PACK

## (undated) DEVICE — Device

## (undated) DEVICE — ASTOUND STANDARD SURGICAL GOWN, XL: Brand: CONVERTORS

## (undated) DEVICE — 40601 PROLONGED POSITIONING SYSTEM: Brand: 40601 PROLONGED POSITIONING SYSTEM

## (undated) DEVICE — GLOVE SRG BIOGEL ECLIPSE 7.5

## (undated) DEVICE — COLUMN DRAPE

## (undated) DEVICE — SURGICEL 4 X 8

## (undated) DEVICE — NEEDLE 30 G X 1/2

## (undated) DEVICE — LARGE NEEDLE DRIVER: Brand: ENDOWRIST

## (undated) DEVICE — STANDARD SURGICAL GOWN, L: Brand: CONVERTORS

## (undated) DEVICE — COTTON TIP APPLICATOR 12PK 3IN

## (undated) DEVICE — 3M™ STERI-STRIP™ REINFORCED ADHESIVE SKIN CLOSURES, R1547, 1/2 IN X 4 IN (12 MM X 100 MM), 6 STRIPS/ENVELOPE: Brand: 3M™ STERI-STRIP™

## (undated) DEVICE — TROCAR: Brand: KII FIOS FIRST ENTRY

## (undated) DEVICE — SOLUTION BOWL: Brand: KENDALL

## (undated) DEVICE — UTILITY MARKER,BLACK WITH LABELS: Brand: DEVON

## (undated) DEVICE — DRAIN SPONGES,6 PLY: Brand: EXCILON

## (undated) DEVICE — SYRINGE 10ML LL

## (undated) DEVICE — 40595 XL TRENDELENBURG POSITIONING KIT: Brand: 40595 XL TRENDELENBURG POSITIONING KIT

## (undated) DEVICE — Device: Brand: OMNICLOSE TROCAR SITE CLOSURE DEVICE

## (undated) DEVICE — ARM DRAPE

## (undated) DEVICE — HEM-O-LOK CLIP CARTRIDGE LARGE DA VINCI SI/XI

## (undated) DEVICE — PREMIUM DRY TRAY LF: Brand: MEDLINE INDUSTRIES, INC.

## (undated) DEVICE — NEEDLE BLUNT 18 G X 1 1/2IN

## (undated) DEVICE — GAUZE SPONGES,16 PLY: Brand: CURITY

## (undated) DEVICE — CANNULA SEAL

## (undated) DEVICE — SUT ETHILON 3-0 FS-1 18 IN 663G

## (undated) DEVICE — CATH FOLEY COUNCIL 20FR 5ML 2 WAY LUBRICATH

## (undated) DEVICE — SUT VICRYL 0 UR-6 27 IN J603H

## (undated) DEVICE — GLOVE INDICATOR PI UNDERGLOVE SZ 8 BLUE

## (undated) DEVICE — SUT MONOCRYL 4-0 PS-2 27 IN Y426H

## (undated) DEVICE — TISSUE RETRIEVAL SYSTEM: Brand: INZII RETRIEVAL SYSTEM

## (undated) DEVICE — STERILE POLYISOPRENE POWDER-FREE SURGICAL GLOVES: Brand: PROTEXIS

## (undated) DEVICE — SCD SEQUENTIAL COMPRESSION COMFORT SLEEVE MEDIUM KNEE LENGTH: Brand: KENDALL SCD

## (undated) DEVICE — ANTIBACTERIAL UNDYED BRAIDED (POLYGLACTIN 910), SYNTHETIC ABSORBABLE SUTURE: Brand: COATED VICRYL

## (undated) DEVICE — NEPTUNE E-SEP SMOKE EVACUATION PENCIL, COATED, 70MM BLADE, PUSH BUTTON SWITCH: Brand: NEPTUNE E-SEP

## (undated) DEVICE — PAD GROUNDING DUAL ADULT

## (undated) DEVICE — DISPOSABLE OR TOWEL: Brand: CARDINAL HEALTH

## (undated) DEVICE — BLADELESS OBTURATOR: Brand: WECK VISTA

## (undated) DEVICE — WIPES INSTRUMENT 2-7/8  X 2-7/8 IN VISI-WIPE

## (undated) DEVICE — SUT STRATAFIX SPIRAL MONOCRYL PLUS 3-0 RB-1 23CM SXMP1B438

## (undated) DEVICE — COTTON TIP APPLICTOR 2 PK

## (undated) DEVICE — INTENDED FOR TISSUE SEPARATION, AND OTHER PROCEDURES THAT REQUIRE A SHARP SURGICAL BLADE TO PUNCTURE OR CUT.: Brand: BARD-PARKER SAFETY BLADES SIZE 15, STERILE

## (undated) DEVICE — STERILE SURGICAL LUBRICANT,  TUBE: Brand: SURGILUBE

## (undated) DEVICE — SYSTEM TRANSPERINEAL ACCESS PRECISIONPOINT

## (undated) DEVICE — CATH FOLEY 18FR 5ML 2WAY LUBRICATH

## (undated) DEVICE — SUT VICRYL 2-0 SH 27 IN UNDYED J417H

## (undated) DEVICE — PROGRASP FORCEPS: Brand: ENDOWRIST

## (undated) DEVICE — ENDOPATH PNEUMONEEDLE INSUFFLATION NEEDLES WITH LUER LOCK CONNECTORS 120MM: Brand: ENDOPATH

## (undated) DEVICE — BAG URINE DRAINAGE URIMETER 350ML LF